# Patient Record
Sex: MALE | Race: WHITE | NOT HISPANIC OR LATINO
[De-identification: names, ages, dates, MRNs, and addresses within clinical notes are randomized per-mention and may not be internally consistent; named-entity substitution may affect disease eponyms.]

---

## 2017-05-11 ENCOUNTER — APPOINTMENT (OUTPATIENT)
Dept: CARDIOLOGY | Facility: CLINIC | Age: 72
End: 2017-05-11

## 2019-06-07 ENCOUNTER — APPOINTMENT (OUTPATIENT)
Dept: CARDIOLOGY | Facility: CLINIC | Age: 74
End: 2019-06-07
Payer: MEDICARE

## 2019-06-07 PROCEDURE — 99214 OFFICE O/P EST MOD 30 MIN: CPT

## 2019-06-07 PROCEDURE — 93000 ELECTROCARDIOGRAM COMPLETE: CPT

## 2019-06-26 ENCOUNTER — APPOINTMENT (OUTPATIENT)
Dept: ORTHOPEDIC SURGERY | Facility: CLINIC | Age: 74
End: 2019-06-26

## 2019-08-15 ENCOUNTER — APPOINTMENT (OUTPATIENT)
Dept: CARDIOLOGY | Facility: CLINIC | Age: 74
End: 2019-08-15
Payer: MEDICARE

## 2019-08-15 VITALS — DIASTOLIC BLOOD PRESSURE: 60 MMHG | WEIGHT: 285 LBS | BODY MASS INDEX: 38.65 KG/M2 | SYSTOLIC BLOOD PRESSURE: 110 MMHG

## 2019-08-15 PROCEDURE — 99213 OFFICE O/P EST LOW 20 MIN: CPT

## 2019-08-15 PROCEDURE — 93280 PM DEVICE PROGR EVAL DUAL: CPT

## 2019-08-15 NOTE — PHYSICAL EXAM
[General Appearance - Well Developed] : well developed [No Deformities] : no deformities [General Appearance - Well Nourished] : well nourished [Heart Rate And Rhythm] : heart rate and rhythm were normal [Heart Sounds] : normal S1 and S2 [Arterial Pulses Normal] : the arterial pulses were normal [Respiration, Rhythm And Depth] : normal respiratory rhythm and effort [Auscultation Breath Sounds / Voice Sounds] : lungs were clear to auscultation bilaterally [Left Infraclavicular] : left infraclavicular area [Dry] : dry [Clean] : clean [Bowel Sounds] : normal bowel sounds [Well-Healed] : well-healed [Abdomen Soft] : soft [Abdomen Tenderness] : non-tender [] : no hepato-splenomegaly [Nail Clubbing] : no clubbing of the fingernails [Nail Splinter Hemorrhages] : no splinter hemorrhages of the nails [Cyanosis, Localized] : no localized cyanosis

## 2019-08-15 NOTE — DISCUSSION/SUMMARY
[Patient] : the patient [Family] : the patient's family [Pacemaker Function Normal] : normal pacemaker function

## 2019-09-07 NOTE — ASSESSMENT
[FreeTextEntry1] : A/P \par 1. CHB/PPM \par -Normal PPM function \par -No arrhythmia's recorded \par -Pacer dependent \par -RTO in 6 months \par \par 2. DOMINGUEZ \par -ECHO to reassess LVF \par \par 3. HTN- well controlled \par -continue current antihypertensive regimen \par -2 gm Na diet  \par \par \par

## 2019-09-07 NOTE — REVIEW OF SYSTEMS
[Dyspnea on exertion] : dyspnea during exertion [Negative] : Heme/Lymph [Lower Ext Edema] : no extremity edema

## 2019-09-07 NOTE — HISTORY OF PRESENT ILLNESS
[None] : The patient complains of no symptoms [Palpitations] : no palpitations [SOB] : no dyspnea [Erythema at Site] : no erythema at device site [Syncope] : no syncope [Swelling at Site] : no swelling at device site [de-identified] : 73 y/o male with a PPM/ CHB , presents for routine device interrogation. \par \par Last in-office device interrogation 11/2016 . \par \par He denies CP/SOB / palpitations . No dizziness or syncope \par Notes worsening in [place]Z . \par \par ECG (8/15/2019)  - SR at 72 bpm. RV paced rhythm .

## 2019-09-07 NOTE — PROCEDURE
[Pacemaker] : pacemaker [DDDR] : DDDR [Voltage: ___ volts] : Voltage was [unfilled] volts [Impedance: ___ Ohms] : current cell impedance is [unfilled] Ohms [Longevity: ___ months] : The estimated remaining battery life is [unfilled] months [Threshold Testing Performed] : Threshold testing was performed [Lead Imp:  ___ohms] : lead impedance was [unfilled] ohms [Sensing Amplitude ___mv] : sensing amplitude was [unfilled] mv [___V @] : [unfilled] V [___ ms] : [unfilled] ms [Asense-Vsense ___ %] : Asense-Vsense [unfilled]% [Asense-Vpace ___ %] : Asense-Vpace [unfilled]% [Apace-Vsense ___ %] : Apace-Vsense [unfilled]% [Apace-Vpace ___ %] : Apace-Vpace [unfilled]% [See Scanned Paceart Report] : See scanned paceart report [See Device Printout] : See device printout [de-identified] : Pacer Dependant, no underlying at 30bpm [de-identified] : MAURILIO [de-identified] : [id number] [de-identified] : Arleth [de-identified] : 60/130 [de-identified] : 10/31/14 [de-identified] : Normal Device Function . No arrhythmia's recorded.  99.2%

## 2019-09-12 ENCOUNTER — APPOINTMENT (OUTPATIENT)
Dept: CARDIOLOGY | Facility: CLINIC | Age: 74
End: 2019-09-12
Payer: MEDICARE

## 2019-09-12 PROCEDURE — 93306 TTE W/DOPPLER COMPLETE: CPT

## 2019-11-22 ENCOUNTER — APPOINTMENT (OUTPATIENT)
Dept: CARDIOLOGY | Facility: CLINIC | Age: 74
End: 2019-11-22
Payer: MEDICARE

## 2019-11-22 PROCEDURE — 93000 ELECTROCARDIOGRAM COMPLETE: CPT

## 2019-11-22 PROCEDURE — 99214 OFFICE O/P EST MOD 30 MIN: CPT

## 2019-12-02 ENCOUNTER — OUTPATIENT (OUTPATIENT)
Dept: OUTPATIENT SERVICES | Facility: HOSPITAL | Age: 74
LOS: 1 days | Discharge: HOME | End: 2019-12-02

## 2019-12-02 ENCOUNTER — APPOINTMENT (OUTPATIENT)
Dept: UROLOGY | Facility: CLINIC | Age: 74
End: 2019-12-02
Payer: MEDICARE

## 2019-12-02 PROCEDURE — 99203 OFFICE O/P NEW LOW 30 MIN: CPT

## 2019-12-02 NOTE — HISTORY OF PRESENT ILLNESS
[Currently Experiencing ___] :  [unfilled] [Nocturia] : nocturia [None] : None [FreeTextEntry1] : YARA WILLIAMSON is a 74 year old male with a past medical history of BPH. Presents to the office today for recurrent UTI x 3 months ago. Patient referred by PMD for UTI, treated with Cipro and Tamsulosin 0.4 mg, which he took for 3 months, stopped it 2 weeks ago. Currently states he has no urinary issues except for nocturia 2 x a night. Denies flank pain,dysuria, gross hematuria, fever, or chills. Non smoker. \par  [Urinary Incontinence] : no urinary incontinence [Urinary Frequency] : no urinary frequency [Urinary Urgency] : no urinary urgency [Urinary Retention] : no urinary retention [Weak Stream] : no weak stream [Straining] : no straining [Dysuria] : no dysuria [Intermittency] : no intermittency [Hematuria - Gross] : no gross hematuria [Hematuria - Microscopic] : no microscopic hematuria

## 2019-12-02 NOTE — PHYSICAL EXAM
[General Appearance - Well Nourished] : well nourished [General Appearance - Well Developed] : well developed [Normal Appearance] : normal appearance [Well Groomed] : well groomed [General Appearance - In No Acute Distress] : no acute distress [Edema] : no peripheral edema [Respiration, Rhythm And Depth] : normal respiratory rhythm and effort [Exaggerated Use Of Accessory Muscles For Inspiration] : no accessory muscle use [Abdomen Soft] : soft [Abdomen Tenderness] : non-tender [Costovertebral Angle Tenderness] : no ~M costovertebral angle tenderness [Normal Station and Gait] : the gait and station were normal for the patient's age [] : no rash [No Focal Deficits] : no focal deficits [Oriented To Time, Place, And Person] : oriented to person, place, and time [Affect] : the affect was normal [Mood] : the mood was normal [Not Anxious] : not anxious

## 2019-12-02 NOTE — ASSESSMENT
[FreeTextEntry1] : YARA WILLIAMSON is a 74 year old male with a past medical history of BPH. Presents to the office today for recurrent UTI x 3 months ago. Patient referred by PMD for UTI, treated with Cipro and Tamsulosin 0.4 mg, which he took for 3 months, stopped it 2 weeks ago. Currently states he has no urinary issues except for nocturia 2 x a night. Denies flank pain,dysuria, gross hematuria, fever, or chills. Non smoker. \par

## 2019-12-03 DIAGNOSIS — N40.0 BENIGN PROSTATIC HYPERPLASIA WITHOUT LOWER URINARY TRACT SYMPTOMS: ICD-10-CM

## 2019-12-03 DIAGNOSIS — N39.0 URINARY TRACT INFECTION, SITE NOT SPECIFIED: ICD-10-CM

## 2019-12-03 LAB
APPEARANCE: CLEAR
BILIRUBIN URINE: NEGATIVE
BLOOD URINE: NEGATIVE
COLOR: YELLOW
GLUCOSE QUALITATIVE U: NEGATIVE
KETONES URINE: NEGATIVE
LEUKOCYTE ESTERASE URINE: NEGATIVE
NITRITE URINE: NEGATIVE
PH URINE: 5.5
PROTEIN URINE: NORMAL
SPECIFIC GRAVITY URINE: 1.02
UROBILINOGEN URINE: NORMAL

## 2019-12-04 LAB — BACTERIA UR CULT: NORMAL

## 2019-12-09 ENCOUNTER — FORM ENCOUNTER (OUTPATIENT)
Age: 74
End: 2019-12-09

## 2019-12-10 ENCOUNTER — OUTPATIENT (OUTPATIENT)
Dept: OUTPATIENT SERVICES | Facility: HOSPITAL | Age: 74
LOS: 1 days | Discharge: HOME | End: 2019-12-10
Payer: MEDICARE

## 2019-12-10 DIAGNOSIS — N40.0 BENIGN PROSTATIC HYPERPLASIA WITHOUT LOWER URINARY TRACT SYMPTOMS: ICD-10-CM

## 2019-12-10 PROCEDURE — 76857 US EXAM PELVIC LIMITED: CPT | Mod: 26

## 2020-03-02 ENCOUNTER — APPOINTMENT (OUTPATIENT)
Dept: UROLOGY | Facility: CLINIC | Age: 75
End: 2020-03-02

## 2020-06-04 ENCOUNTER — APPOINTMENT (OUTPATIENT)
Dept: CARDIOLOGY | Facility: CLINIC | Age: 75
End: 2020-06-04

## 2020-06-18 ENCOUNTER — APPOINTMENT (OUTPATIENT)
Dept: CARDIOLOGY | Facility: CLINIC | Age: 75
End: 2020-06-18

## 2020-06-19 ENCOUNTER — APPOINTMENT (OUTPATIENT)
Dept: CARDIOLOGY | Facility: CLINIC | Age: 75
End: 2020-06-19
Payer: MEDICARE

## 2020-06-19 VITALS
WEIGHT: 296 LBS | TEMPERATURE: 98 F | HEIGHT: 69 IN | HEART RATE: 80 BPM | SYSTOLIC BLOOD PRESSURE: 110 MMHG | BODY MASS INDEX: 43.84 KG/M2 | DIASTOLIC BLOOD PRESSURE: 74 MMHG

## 2020-06-19 PROCEDURE — 93280 PM DEVICE PROGR EVAL DUAL: CPT

## 2020-06-19 PROCEDURE — 99213 OFFICE O/P EST LOW 20 MIN: CPT

## 2020-06-19 RX ORDER — METOPROLOL TARTRATE 50 MG/1
50 TABLET, FILM COATED ORAL DAILY
Refills: 0 | Status: DISCONTINUED | COMMUNITY
End: 2020-06-19

## 2020-06-19 NOTE — REASON FOR VISIT
[Follow-up Device Check] : follow-up device check visit [Spouse] : spouse [___ Device Check] : [unfilled] device check

## 2020-06-19 NOTE — PHYSICAL EXAM
[General Appearance - Well Nourished] : well nourished [General Appearance - Well Developed] : well developed [No Deformities] : no deformities [Heart Rate And Rhythm] : heart rate and rhythm were normal [Arterial Pulses Normal] : the arterial pulses were normal [Heart Sounds] : normal S1 and S2 [Left Infraclavicular] : left infraclavicular area [Respiration, Rhythm And Depth] : normal respiratory rhythm and effort [Auscultation Breath Sounds / Voice Sounds] : lungs were clear to auscultation bilaterally [Well-Healed] : well-healed [Dry] : dry [Clean] : clean [Bowel Sounds] : normal bowel sounds [Abdomen Soft] : soft [] : no hepato-splenomegaly [Abdomen Tenderness] : non-tender [Nail Clubbing] : no clubbing of the fingernails [Cyanosis, Localized] : no localized cyanosis [Nail Splinter Hemorrhages] : no splinter hemorrhages of the nails

## 2020-07-22 ENCOUNTER — RECORD ABSTRACTING (OUTPATIENT)
Age: 75
End: 2020-07-22

## 2020-07-22 DIAGNOSIS — Z78.9 OTHER SPECIFIED HEALTH STATUS: ICD-10-CM

## 2020-07-27 ENCOUNTER — APPOINTMENT (OUTPATIENT)
Dept: UROLOGY | Facility: CLINIC | Age: 75
End: 2020-07-27
Payer: MEDICARE

## 2020-07-27 DIAGNOSIS — N39.0 URINARY TRACT INFECTION, SITE NOT SPECIFIED: ICD-10-CM

## 2020-07-27 DIAGNOSIS — N40.0 BENIGN PROSTATIC HYPERPLASIA WITHOUT LOWER URINARY TRACT SYMPMS: ICD-10-CM

## 2020-07-27 PROCEDURE — 99213 OFFICE O/P EST LOW 20 MIN: CPT

## 2020-07-27 RX ORDER — TAMSULOSIN HYDROCHLORIDE 0.4 MG/1
0.4 CAPSULE ORAL
Qty: 90 | Refills: 0 | Status: COMPLETED | COMMUNITY
Start: 2019-11-19 | End: 2020-07-27

## 2020-07-27 NOTE — PHYSICAL EXAM
[Normal Appearance] : normal appearance [General Appearance - Well Developed] : well developed [General Appearance - Well Nourished] : well nourished [Well Groomed] : well groomed [General Appearance - In No Acute Distress] : no acute distress [Abdomen Soft] : soft [Abdomen Tenderness] : non-tender [Costovertebral Angle Tenderness] : no ~M costovertebral angle tenderness [] : no respiratory distress [Edema] : no peripheral edema [Respiration, Rhythm And Depth] : normal respiratory rhythm and effort [Oriented To Time, Place, And Person] : oriented to person, place, and time [Exaggerated Use Of Accessory Muscles For Inspiration] : no accessory muscle use [Affect] : the affect was normal [Mood] : the mood was normal [Not Anxious] : not anxious [Normal Station and Gait] : the gait and station were normal for the patient's age [No Focal Deficits] : no focal deficits

## 2020-08-03 PROBLEM — N39.0 RECURRENT UTI: Status: ACTIVE | Noted: 2019-12-02

## 2020-08-03 NOTE — ASSESSMENT
[FreeTextEntry1] : YARA WILLIAMSON is a 75 year old male with a past medical history of BPH. Presents to the office today for a follow up. Currently states he has no urinary issues except for nocturia 0-1 x a night. Denies flank pain,dysuria, gross hematuria, fever, or chills. Non smoker. He is on Tamsulosin 0.4 mg daily started 3 weeks ago and states his urination is good, no complaints. Satisfied with urinary condition. No recent UTI's. \par \par 12/2019\par UA negative\par Urine culture- no growth\par \par US 12/2019 -- images visualized by me\par -PVR - 63 cc\par -Prostate volume 52 cc

## 2020-08-04 ENCOUNTER — APPOINTMENT (OUTPATIENT)
Dept: CARDIOLOGY | Facility: CLINIC | Age: 75
End: 2020-08-04
Payer: MEDICARE

## 2020-08-04 VITALS
HEIGHT: 69 IN | SYSTOLIC BLOOD PRESSURE: 108 MMHG | HEART RATE: 66 BPM | DIASTOLIC BLOOD PRESSURE: 78 MMHG | TEMPERATURE: 98 F

## 2020-08-04 PROCEDURE — 93000 ELECTROCARDIOGRAM COMPLETE: CPT

## 2020-08-04 PROCEDURE — 99214 OFFICE O/P EST MOD 30 MIN: CPT

## 2020-08-04 RX ORDER — LISINOPRIL 40 MG/1
40 TABLET ORAL DAILY
Refills: 0 | Status: DISCONTINUED | COMMUNITY
End: 2020-08-04

## 2020-08-04 RX ORDER — METOPROLOL SUCCINATE 25 MG/1
25 TABLET, EXTENDED RELEASE ORAL DAILY
Qty: 90 | Refills: 3 | Status: DISCONTINUED | COMMUNITY
Start: 2020-06-19 | End: 2020-08-04

## 2020-08-04 RX ORDER — LISINOPRIL 10 MG/1
10 TABLET ORAL DAILY
Refills: 0 | Status: DISCONTINUED | COMMUNITY
End: 2020-08-04

## 2020-08-04 NOTE — PHYSICAL EXAM
[General Appearance - Well Nourished] : well nourished [General Appearance - Well Developed] : well developed [Normal Conjunctiva] : the conjunctiva exhibited no abnormalities [Normal Oral Mucosa] : normal oral mucosa [Normal Jugular Venous A Waves Present] : normal jugular venous A waves present [Normal Jugular Venous V Waves Present] : normal jugular venous V waves present [No Jugular Venous Eng A Waves] : no jugular venous eng A waves [Respiration, Rhythm And Depth] : normal respiratory rhythm and effort [Heart Rate And Rhythm] : heart rate and rhythm were normal [Heart Sounds] : normal S1 and S2 [Chest Palpation] : palpation of the chest revealed no abnormalities [Auscultation Breath Sounds / Voice Sounds] : lungs were clear to auscultation bilaterally [Arterial Pulses Normal] : the arterial pulses were normal [Abdomen Soft] : soft [Abdomen Tenderness] : non-tender [Abnormal Walk] : normal gait [Nail Clubbing] : no clubbing of the fingernails [Skin Turgor] : normal skin turgor [Oriented To Time, Place, And Person] : oriented to person, place, and time [] : no rash [Affect] : the affect was normal [Memory Recent] : recent memory was not impaired [FreeTextEntry1] : Obese

## 2020-08-04 NOTE — DISCUSSION/SUMMARY
[FreeTextEntry1] : Patient was instructed to target their T. Cholesterol to less than 200 mg/dl and LDL cholesterol to less than 70 mg/dl.\par Exercise and weight loss was advised. Patient does not exercise at all\par Maintain cardiac medications. \par Patient was advised to repeat a BMP, CBC , fasting lipid profile and hepatic panel.\par

## 2020-08-04 NOTE — REVIEW OF SYSTEMS
[Joint Pain] : joint pain [Limb Weakness (Paresis)] : limb weakness [Negative] : Heme/Lymph [Dizziness] : no dizziness [Tingling (Paresthesia)] : no tingling [Numbness (Hypesthesia)] : no numbness [Convulsions] : no convulsions

## 2020-08-04 NOTE — ASSESSMENT
[FreeTextEntry1] : ASHD\par S/P CABG\par S/P PTCA/stent of RCA\par DM\par Hyperlipidemia\par Obesity

## 2020-08-04 NOTE — HISTORY OF PRESENT ILLNESS
[FreeTextEntry1] : CAD\par S/P MI\par S/P CABG\par PPM\par HTN\par S/P PTCA/stent of RCA\par CVA\par DM

## 2020-08-04 NOTE — REASON FOR VISIT
[FreeTextEntry1] : Patient presents for follow up. His furosemide was decreased to 20 mg daily from 40 mg. and his lisinopril was discontinued by his PCP.

## 2020-09-07 NOTE — HISTORY OF PRESENT ILLNESS
[None] : The patient complains of no symptoms [Palpitations] : no palpitations [SOB] : no dyspnea [Syncope] : no syncope [Swelling at Site] : no swelling at device site [de-identified] : 74 y/o male with a PPM/ CHB , presents for routine device interrogation. \par \par \par \par He denies CP/SOB / palpitations . No dizziness or syncope \par  \par \par ECG (8/15/2019)  - SR at 72 bpm. RV paced rhythm .   [Erythema at Site] : no erythema at device site

## 2020-09-07 NOTE — ASSESSMENT
[FreeTextEntry1] : A/P \par 1. CHB/PPM \par -Normal PPM function \par -No arrhythmia's recorded \par -Pacer dependent \par -RTO in 6 months \par \par HTN\par -continue current antihypertensive regimen \par -2 gm Na diet  \par - c/w metoprolol succ 25mg daily\par \par NOT ON REMOTE\par \par

## 2020-09-07 NOTE — PROCEDURE
[See Scanned Paceart Report] : See scanned paceart report [See Device Printout] : See device printout [Pacemaker] : pacemaker [DDDR] : DDDR [Impedance: ___ Ohms] : current cell impedance is [unfilled] Ohms [Threshold Testing Performed] : Threshold testing was performed [Sensing Amplitude ___mv] : sensing amplitude was [unfilled] mv [Asense-Vsense ___ %] : Asense-Vsense [unfilled]% [___ ms] : [unfilled] ms [Asense-Vpace ___ %] : Asense-Vpace [unfilled]% [Apace-Vsense ___ %] : Apace-Vsense [unfilled]% [Apace-Vpace ___ %] : Apace-Vpace [unfilled]% [Longevity: ___ months] : The estimated remaining battery life is [unfilled] months [Voltage: ___ volts] : Voltage was [unfilled] volts [Lead Imp:  ___ohms] : lead impedance was [unfilled] ohms [___V @] : [unfilled] V [Programmed for Longevity] : output reprogrammed for improved battery longevity [de-identified] : Pacer Dependant, no underlying at 30bpm [de-identified] : MAURILIO [de-identified] : Arleth [de-identified] : [id number] [de-identified] : 60/130 [de-identified] : 10/31/14 [de-identified] : Normal Device Function . No arrhythmia's recorded.  99.2%

## 2020-09-07 NOTE — REVIEW OF SYSTEMS
[Dyspnea on exertion] : dyspnea during exertion [Negative] : Endocrine [Lower Ext Edema] : no extremity edema

## 2020-11-17 ENCOUNTER — APPOINTMENT (OUTPATIENT)
Dept: CARDIOLOGY | Facility: CLINIC | Age: 75
End: 2020-11-17
Payer: MEDICARE

## 2020-11-17 VITALS
HEIGHT: 69 IN | HEART RATE: 83 BPM | TEMPERATURE: 97.3 F | BODY MASS INDEX: 43.99 KG/M2 | SYSTOLIC BLOOD PRESSURE: 131 MMHG | WEIGHT: 297 LBS | DIASTOLIC BLOOD PRESSURE: 83 MMHG

## 2020-11-17 PROCEDURE — 99213 OFFICE O/P EST LOW 20 MIN: CPT

## 2020-11-17 PROCEDURE — 99072 ADDL SUPL MATRL&STAF TM PHE: CPT

## 2020-11-17 PROCEDURE — 93280 PM DEVICE PROGR EVAL DUAL: CPT

## 2020-11-17 RX ORDER — ONDANSETRON 4 MG/1
4 TABLET, ORALLY DISINTEGRATING ORAL
Qty: 60 | Refills: 0 | Status: COMPLETED | COMMUNITY
Start: 2019-11-03 | End: 2020-11-17

## 2020-11-17 RX ORDER — FAMOTIDINE 20 MG/1
20 TABLET, FILM COATED ORAL DAILY
Refills: 0 | Status: DISCONTINUED | COMMUNITY
End: 2020-11-17

## 2020-11-17 RX ORDER — ASPIRIN 81 MG/1
81 TABLET, COATED ORAL
Refills: 0 | Status: DISCONTINUED | COMMUNITY
End: 2020-11-17

## 2020-11-17 RX ORDER — IPRATROPIUM BROMIDE 42 UG/1
0.06 SPRAY NASAL
Qty: 15 | Refills: 0 | Status: DISCONTINUED | COMMUNITY
Start: 2019-09-22 | End: 2020-11-17

## 2020-11-17 NOTE — PROCEDURE
[See Scanned Paceart Report] : See scanned paceart report [See Device Printout] : See device printout [Pacemaker] : pacemaker [DDDR] : DDDR [Voltage: ___ volts] : Voltage was [unfilled] volts [Impedance: ___ Ohms] : current cell impedance is [unfilled] Ohms [Longevity: ___ months] : The estimated remaining battery life is [unfilled] months [Threshold Testing Performed] : Threshold testing was performed [Programmed for Longevity] : output reprogrammed for improved battery longevity [Asense-Vsense ___ %] : Asense-Vsense [unfilled]% [Asense-Vpace ___ %] : Asense-Vpace [unfilled]% [Apace-Vsense ___ %] : Apace-Vsense [unfilled]% [Apace-Vpace ___ %] : Apace-Vpace [unfilled]% [de-identified] : Pacer Dependant, no underlying at 30bpm [de-identified] : MAURILIO [de-identified] : Arleth [de-identified] : [id number] [de-identified] : 10/31/14 [de-identified] : 60/130 [de-identified] : Normal Device Function .

## 2020-11-17 NOTE — REVIEW OF SYSTEMS
[Dyspnea on exertion] : dyspnea during exertion [Lower Ext Edema] : no extremity edema [Negative] : Heme/Lymph

## 2020-11-17 NOTE — HISTORY OF PRESENT ILLNESS
[None] : The patient complains of no symptoms [Palpitations] : no palpitations [SOB] : no dyspnea [Syncope] : no syncope [Erythema at Site] : no erythema at device site [Swelling at Site] : no swelling at device site [de-identified] : 76 y/o male with a PPM/ CHB , presents for routine device interrogation. \par \par \par \par He denies CP/SOB / palpitations . No dizziness or syncope \par  \par \par ECG (8/15/2019)  - SR at 72 bpm. RV paced rhythm .

## 2020-11-17 NOTE — ASSESSMENT
[FreeTextEntry1] : A/P \par 1. CHB/PPM \par -Normal PPM function \par -No arrhythmia's recorded \par -Pacer dependent \par -RTO in 6 months \par \par

## 2020-11-17 NOTE — PHYSICAL EXAM
[General Appearance - Well Developed] : well developed [General Appearance - Well Nourished] : well nourished [No Deformities] : no deformities [Heart Rate And Rhythm] : heart rate and rhythm were normal [Heart Sounds] : normal S1 and S2 [Arterial Pulses Normal] : the arterial pulses were normal [Respiration, Rhythm And Depth] : normal respiratory rhythm and effort [Auscultation Breath Sounds / Voice Sounds] : lungs were clear to auscultation bilaterally [Left Infraclavicular] : left infraclavicular area [Clean] : clean [Dry] : dry [Well-Healed] : well-healed [Bowel Sounds] : normal bowel sounds [Abdomen Soft] : soft [Abdomen Tenderness] : non-tender [] : no hepato-splenomegaly [Nail Clubbing] : no clubbing of the fingernails [Cyanosis, Localized] : no localized cyanosis [Nail Splinter Hemorrhages] : no splinter hemorrhages of the nails

## 2020-12-01 ENCOUNTER — APPOINTMENT (OUTPATIENT)
Dept: CARDIOLOGY | Facility: CLINIC | Age: 75
End: 2020-12-01
Payer: MEDICARE

## 2020-12-01 VITALS
SYSTOLIC BLOOD PRESSURE: 110 MMHG | BODY MASS INDEX: 44.14 KG/M2 | DIASTOLIC BLOOD PRESSURE: 70 MMHG | WEIGHT: 298 LBS | HEIGHT: 69 IN | TEMPERATURE: 98.2 F | HEART RATE: 67 BPM

## 2020-12-01 DIAGNOSIS — Z00.00 ENCOUNTER FOR GENERAL ADULT MEDICAL EXAMINATION W/OUT ABNORMAL FINDINGS: ICD-10-CM

## 2020-12-01 PROCEDURE — 99214 OFFICE O/P EST MOD 30 MIN: CPT

## 2020-12-01 PROCEDURE — 99072 ADDL SUPL MATRL&STAF TM PHE: CPT

## 2020-12-01 PROCEDURE — 93000 ELECTROCARDIOGRAM COMPLETE: CPT

## 2020-12-01 NOTE — DISCUSSION/SUMMARY
[FreeTextEntry1] : Patient was instructed to target their T. Cholesterol to less than 200 mg/dl and LDL cholesterol to less than 70 mg/dl.\par Exercise and weight loss was advised. Patient does not exercise at all\par Maintain cardiac medications. \par Patient was advised to repeat a BMP, CBC , fasting lipid profile and hepatic panel.\par RV in 6 months.\par Patient was counseled on improving his HDL cholesterol through exercise.

## 2020-12-01 NOTE — PHYSICAL EXAM
[General Appearance - Well Developed] : well developed [General Appearance - Well Nourished] : well nourished [Normal Conjunctiva] : the conjunctiva exhibited no abnormalities [Normal Oral Mucosa] : normal oral mucosa [Normal Jugular Venous A Waves Present] : normal jugular venous A waves present [Normal Jugular Venous V Waves Present] : normal jugular venous V waves present [No Jugular Venous Eng A Waves] : no jugular venous eng A waves [Respiration, Rhythm And Depth] : normal respiratory rhythm and effort [Auscultation Breath Sounds / Voice Sounds] : lungs were clear to auscultation bilaterally [Chest Palpation] : palpation of the chest revealed no abnormalities [Heart Rate And Rhythm] : heart rate and rhythm were normal [Heart Sounds] : normal S1 and S2 [Arterial Pulses Normal] : the arterial pulses were normal [Abdomen Soft] : soft [Abdomen Tenderness] : non-tender [Abnormal Walk] : normal gait [Nail Clubbing] : no clubbing of the fingernails [Skin Turgor] : normal skin turgor [] : no rash [Oriented To Time, Place, And Person] : oriented to person, place, and time [Affect] : the affect was normal [Memory Recent] : recent memory was not impaired [FreeTextEntry1] : +1 LE edema bilaterally

## 2020-12-01 NOTE — REVIEW OF SYSTEMS
[Joint Pain] : joint pain [Limb Weakness (Paresis)] : limb weakness [Negative] : Heme/Lymph [Dizziness] : no dizziness [Numbness (Hypesthesia)] : no numbness [Convulsions] : no convulsions [Tingling (Paresthesia)] : no tingling

## 2021-01-04 ENCOUNTER — APPOINTMENT (OUTPATIENT)
Dept: UROLOGY | Facility: CLINIC | Age: 76
End: 2021-01-04

## 2021-03-01 ENCOUNTER — RX RENEWAL (OUTPATIENT)
Age: 76
End: 2021-03-01

## 2021-04-27 ENCOUNTER — RX RENEWAL (OUTPATIENT)
Age: 76
End: 2021-04-27

## 2021-05-18 ENCOUNTER — APPOINTMENT (OUTPATIENT)
Dept: CARDIOLOGY | Facility: CLINIC | Age: 76
End: 2021-05-18
Payer: MEDICARE

## 2021-05-18 VITALS
HEART RATE: 66 BPM | BODY MASS INDEX: 45.18 KG/M2 | HEIGHT: 69 IN | DIASTOLIC BLOOD PRESSURE: 62 MMHG | OXYGEN SATURATION: 94 % | WEIGHT: 305 LBS | TEMPERATURE: 97.7 F | SYSTOLIC BLOOD PRESSURE: 96 MMHG

## 2021-05-18 PROCEDURE — 99072 ADDL SUPL MATRL&STAF TM PHE: CPT

## 2021-05-18 PROCEDURE — 93280 PM DEVICE PROGR EVAL DUAL: CPT

## 2021-05-30 NOTE — PROCEDURE
[See Scanned Paceart Report] : See scanned paceart report [See Device Printout] : See device printout [Pacemaker] : pacemaker [DDDR] : DDDR [Threshold Testing Performed] : Threshold testing was performed [Programmed for Longevity] : output reprogrammed for improved battery longevity [de-identified] : Pacer Dependant, no underlying at 30bpm [de-identified] : MAURILIO [de-identified] : Arleth [de-identified] : [id number] [de-identified] : 10/31/14 [de-identified] : 60/130 [de-identified] : Normal Device Function .

## 2021-05-30 NOTE — HISTORY OF PRESENT ILLNESS
[None] : The patient complains of no symptoms [Palpitations] : no palpitations [SOB] : no dyspnea [Syncope] : no syncope [Erythema at Site] : no erythema at device site [Swelling at Site] : no swelling at device site [de-identified] : 75 y/o male with a PPM/ CHB , presents for routine device interrogation. \par \par \par \par He denies CP/SOB / palpitations . No dizziness or syncope \par  \par \par ECG (8/15/2019)  - SR at 72 bpm. RV paced rhythm .

## 2021-06-04 ENCOUNTER — RX RENEWAL (OUTPATIENT)
Age: 76
End: 2021-06-04

## 2021-06-07 ENCOUNTER — APPOINTMENT (OUTPATIENT)
Dept: CARDIOLOGY | Facility: CLINIC | Age: 76
End: 2021-06-07
Payer: MEDICARE

## 2021-06-07 VITALS
HEART RATE: 74 BPM | HEIGHT: 69 IN | TEMPERATURE: 98.7 F | DIASTOLIC BLOOD PRESSURE: 70 MMHG | WEIGHT: 306 LBS | BODY MASS INDEX: 45.32 KG/M2 | SYSTOLIC BLOOD PRESSURE: 100 MMHG

## 2021-06-07 PROCEDURE — 99072 ADDL SUPL MATRL&STAF TM PHE: CPT

## 2021-06-07 PROCEDURE — 93000 ELECTROCARDIOGRAM COMPLETE: CPT

## 2021-06-07 PROCEDURE — 99214 OFFICE O/P EST MOD 30 MIN: CPT

## 2021-06-07 NOTE — DISCUSSION/SUMMARY
[FreeTextEntry1] : Patient was instructed to target his T. Cholesterol to less than 200 mg/dl and LDL cholesterol to less than 70 mg/dl.\par Exercise and weight loss was advised. Patient does not exercise at all\par Maintain cardiac medications. \par Patient was advised to repeat a BMP, CBC , fasting lipid profile and hepatic panel.\par Patient was counseled on improving his HDL cholesterol through exercise.\par RV in 4 months

## 2021-06-07 NOTE — ASSESSMENT
[FreeTextEntry1] : ASHD\par S/P CABG\par S/P PTCA/stent of RCA\par DM\par Hyperlipidemia\par Obesity Doxycycline Pregnancy And Lactation Text: This medication is Pregnancy Category D and not consider safe during pregnancy. It is also excreted in breast milk but is considered safe for shorter treatment courses.

## 2021-09-06 ENCOUNTER — EMERGENCY (EMERGENCY)
Facility: HOSPITAL | Age: 76
LOS: 0 days | Discharge: HOME | End: 2021-09-06
Attending: EMERGENCY MEDICINE | Admitting: EMERGENCY MEDICINE
Payer: MEDICARE

## 2021-09-06 VITALS
OXYGEN SATURATION: 96 % | DIASTOLIC BLOOD PRESSURE: 58 MMHG | WEIGHT: 220.02 LBS | SYSTOLIC BLOOD PRESSURE: 124 MMHG | TEMPERATURE: 96 F | HEART RATE: 87 BPM | RESPIRATION RATE: 18 BRPM | HEIGHT: 72 IN

## 2021-09-06 DIAGNOSIS — S02.2XXA FRACTURE OF NASAL BONES, INITIAL ENCOUNTER FOR CLOSED FRACTURE: ICD-10-CM

## 2021-09-06 DIAGNOSIS — S01.81XA LACERATION WITHOUT FOREIGN BODY OF OTHER PART OF HEAD, INITIAL ENCOUNTER: ICD-10-CM

## 2021-09-06 DIAGNOSIS — M25.562 PAIN IN LEFT KNEE: ICD-10-CM

## 2021-09-06 DIAGNOSIS — S01.21XA LACERATION WITHOUT FOREIGN BODY OF NOSE, INITIAL ENCOUNTER: ICD-10-CM

## 2021-09-06 DIAGNOSIS — S09.90XA UNSPECIFIED INJURY OF HEAD, INITIAL ENCOUNTER: ICD-10-CM

## 2021-09-06 DIAGNOSIS — I25.10 ATHEROSCLEROTIC HEART DISEASE OF NATIVE CORONARY ARTERY WITHOUT ANGINA PECTORIS: ICD-10-CM

## 2021-09-06 DIAGNOSIS — M54.2 CERVICALGIA: ICD-10-CM

## 2021-09-06 DIAGNOSIS — S01.511A LACERATION WITHOUT FOREIGN BODY OF LIP, INITIAL ENCOUNTER: ICD-10-CM

## 2021-09-06 DIAGNOSIS — Z23 ENCOUNTER FOR IMMUNIZATION: ICD-10-CM
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
## 2021-09-06 LAB
ALBUMIN SERPL ELPH-MCNC: 4.3 G/DL — SIGNIFICANT CHANGE UP (ref 3.5–5.2)
ALP SERPL-CCNC: 99 U/L — SIGNIFICANT CHANGE UP (ref 30–115)
ALT FLD-CCNC: 10 U/L — SIGNIFICANT CHANGE UP (ref 0–41)
ANION GAP SERPL CALC-SCNC: 13 MMOL/L — SIGNIFICANT CHANGE UP (ref 7–14)
AST SERPL-CCNC: 19 U/L — SIGNIFICANT CHANGE UP (ref 0–41)
BASOPHILS # BLD AUTO: 0.02 K/UL — SIGNIFICANT CHANGE UP (ref 0–0.2)
BASOPHILS NFR BLD AUTO: 0.3 % — SIGNIFICANT CHANGE UP (ref 0–1)
BILIRUB SERPL-MCNC: 1 MG/DL — SIGNIFICANT CHANGE UP (ref 0.2–1.2)
BUN SERPL-MCNC: 33 MG/DL — HIGH (ref 10–20)
CALCIUM SERPL-MCNC: 9.2 MG/DL — SIGNIFICANT CHANGE UP (ref 8.5–10.1)
CHLORIDE SERPL-SCNC: 103 MMOL/L — SIGNIFICANT CHANGE UP (ref 98–110)
CO2 SERPL-SCNC: 24 MMOL/L — SIGNIFICANT CHANGE UP (ref 17–32)
CREAT SERPL-MCNC: 1 MG/DL — SIGNIFICANT CHANGE UP (ref 0.7–1.5)
EOSINOPHIL # BLD AUTO: 0.11 K/UL — SIGNIFICANT CHANGE UP (ref 0–0.7)
EOSINOPHIL NFR BLD AUTO: 1.4 % — SIGNIFICANT CHANGE UP (ref 0–8)
GLUCOSE SERPL-MCNC: 127 MG/DL — HIGH (ref 70–99)
HCT VFR BLD CALC: 45.6 % — SIGNIFICANT CHANGE UP (ref 42–52)
HGB BLD-MCNC: 14.7 G/DL — SIGNIFICANT CHANGE UP (ref 14–18)
IMM GRANULOCYTES NFR BLD AUTO: 0.3 % — SIGNIFICANT CHANGE UP (ref 0.1–0.3)
LYMPHOCYTES # BLD AUTO: 1.76 K/UL — SIGNIFICANT CHANGE UP (ref 1.2–3.4)
LYMPHOCYTES # BLD AUTO: 22 % — SIGNIFICANT CHANGE UP (ref 20.5–51.1)
MCHC RBC-ENTMCNC: 30.4 PG — SIGNIFICANT CHANGE UP (ref 27–31)
MCHC RBC-ENTMCNC: 32.2 G/DL — SIGNIFICANT CHANGE UP (ref 32–37)
MCV RBC AUTO: 94.2 FL — HIGH (ref 80–94)
MONOCYTES # BLD AUTO: 0.62 K/UL — HIGH (ref 0.1–0.6)
MONOCYTES NFR BLD AUTO: 7.8 % — SIGNIFICANT CHANGE UP (ref 1.7–9.3)
NEUTROPHILS # BLD AUTO: 5.47 K/UL — SIGNIFICANT CHANGE UP (ref 1.4–6.5)
NEUTROPHILS NFR BLD AUTO: 68.2 % — SIGNIFICANT CHANGE UP (ref 42.2–75.2)
NRBC # BLD: 0 /100 WBCS — SIGNIFICANT CHANGE UP (ref 0–0)
PLATELET # BLD AUTO: 151 K/UL — SIGNIFICANT CHANGE UP (ref 130–400)
POTASSIUM SERPL-MCNC: 5.5 MMOL/L — HIGH (ref 3.5–5)
POTASSIUM SERPL-SCNC: 5.5 MMOL/L — HIGH (ref 3.5–5)
PROT SERPL-MCNC: 6.9 G/DL — SIGNIFICANT CHANGE UP (ref 6–8)
RBC # BLD: 4.84 M/UL — SIGNIFICANT CHANGE UP (ref 4.7–6.1)
RBC # FLD: 12.7 % — SIGNIFICANT CHANGE UP (ref 11.5–14.5)
SODIUM SERPL-SCNC: 140 MMOL/L — SIGNIFICANT CHANGE UP (ref 135–146)
TROPONIN T SERPL-MCNC: <0.01 NG/ML — SIGNIFICANT CHANGE UP
WBC # BLD: 8 K/UL — SIGNIFICANT CHANGE UP (ref 4.8–10.8)
WBC # FLD AUTO: 8 K/UL — SIGNIFICANT CHANGE UP (ref 4.8–10.8)

## 2021-09-06 PROCEDURE — 72170 X-RAY EXAM OF PELVIS: CPT | Mod: 26

## 2021-09-06 PROCEDURE — 71045 X-RAY EXAM CHEST 1 VIEW: CPT | Mod: 26

## 2021-09-06 PROCEDURE — 70450 CT HEAD/BRAIN W/O DYE: CPT | Mod: 26,MA

## 2021-09-06 PROCEDURE — 72125 CT NECK SPINE W/O DYE: CPT | Mod: 26,MA

## 2021-09-06 PROCEDURE — 99285 EMERGENCY DEPT VISIT HI MDM: CPT | Mod: 25

## 2021-09-06 PROCEDURE — 12013 RPR F/E/E/N/L/M 2.6-5.0 CM: CPT

## 2021-09-06 PROCEDURE — 12051 INTMD RPR FACE/MM 2.5 CM/<: CPT

## 2021-09-06 PROCEDURE — 73562 X-RAY EXAM OF KNEE 3: CPT | Mod: 26,LT

## 2021-09-06 RX ORDER — TETANUS TOXOID, REDUCED DIPHTHERIA TOXOID AND ACELLULAR PERTUSSIS VACCINE, ADSORBED 5; 2.5; 8; 8; 2.5 [IU]/.5ML; [IU]/.5ML; UG/.5ML; UG/.5ML; UG/.5ML
0.5 SUSPENSION INTRAMUSCULAR ONCE
Refills: 0 | Status: COMPLETED | OUTPATIENT
Start: 2021-09-06 | End: 2021-09-06

## 2021-09-06 RX ADMIN — TETANUS TOXOID, REDUCED DIPHTHERIA TOXOID AND ACELLULAR PERTUSSIS VACCINE, ADSORBED 0.5 MILLILITER(S): 5; 2.5; 8; 8; 2.5 SUSPENSION INTRAMUSCULAR at 18:44

## 2021-09-06 NOTE — ED PROVIDER NOTE - OBJECTIVE STATEMENT
77 yo male w/ PMH of CAD presents for head trauma after fall.  No blood thinners, mechanical fall tripped and fell in bathroom, landed on face/nose and left knee, no LOC, was able to get up with help after.  Reports lacerations to nose and bottom left lip, L sided neck pain, and L knee pain.  Denies headache, N/V, abdominal pain, dizziness, midline back/neck pain, and pain elsewhere.

## 2021-09-06 NOTE — ED PROVIDER NOTE - WR ORDER DATE AND TIME 1
Follow up outreach call attempt, no answer. CTN left VM with contact information and request for return call. CTN will continue with outreach call attempts. 06-Sep-2021 18:08

## 2021-09-06 NOTE — ED PROVIDER NOTE - PATIENT PORTAL LINK FT
You can access the FollowMyHealth Patient Portal offered by [facility] by registering at the following website: [email or web address]. By joining [facility]’s FollowMyHealth portal, you will also be able to view your health information using other applications (apps) compatible with our system.

## 2021-09-06 NOTE — ED PROVIDER NOTE - NSFOLLOWUPINSTRUCTIONS_ED_ALL_ED_FT
PLEASE HAVE SUTURES OVER THE NOSE AND SINGLE BLACK SUTURE OVER OUTER LIP BORDER REMOVED IN 5 DAYS.  THE 2 SKIN COLORED SUTURES ON INNER LIP ARE ABSORBABLE AND SHOULD NOT BE REMOVED.    Closed Head Injury    Closed head injury in an injury to your head that may or may not involve a traumatic brain injury (TBI). Symptoms of TBI can be short or long lasting and include headache, dizziness, interference with memory or speech, fatigue, confusion, changes in sleep, mood changes, nausea, depression/anxiety, and dulling of senses. Make sure to obtain proper rest which includes getting plenty of sleep, avoiding excessive visual stimulation, and avoiding activities that may cause physical or mental stress. Avoid any situation where there is potential for another head injury including sports.    SEEK MEDICAL CARE IF YOU HAVE THE FOLLOWING SYMPTOMS: unusual drowsiness, vomiting, severe dizziness, seizures, lightheadedness, muscular weakness, different pupil sizes, visual changes, or clear or bloody discharge from your ears or nose.    Laceration    WHAT YOU NEED TO KNOW:    A laceration is an injury to the skin and the soft tissue underneath it. Lacerations happen when you are cut or hit by something. They can happen anywhere on the body.     DISCHARGE INSTRUCTIONS:    Return to the emergency department if:     You have heavy bleeding or bleeding that does not stop after 10 minutes of holding firm, direct pressure over the wound.       Your wound opens up.     Contact your healthcare provider if:     You have a fever or chills.       Your laceration is red, warm, or swollen.      You have red streaks on your skin coming from your wound.      You have white or yellow drainage from the wound that smells bad.      You have pain that gets worse, even after treatment.       You have questions or concerns about your condition or care.     Medicines:     Prescription pain medicine may be given. Ask how to take this medicine safely.       Antibiotics help treat or prevent a bacterial infection.       Take your medicine as directed. Contact your healthcare provider if you think your medicine is not helping or if you have side effects. Tell him or her if you are allergic to any medicine. Keep a list of the medicines, vitamins, and herbs you take. Include the amounts, and when and why you take them. Bring the list or the pill bottles to follow-up visits. Carry your medicine list with you in case of an emergency.    Care for your wound as directed:     Do not get your wound wet until your healthcare provider says it is okay. Do not soak your wound in water. Do not go swimming until your healthcare provider says it is okay. Carefully wash the wound with soap and water. Gently pat the area dry or allow it to air dry.       Change your bandages when they get wet, dirty, or after washing. Apply new, clean bandages as directed. Do not apply elastic bandages or tape too tight. Do not put powders or lotions over your incision.       Apply antibiotic ointment as directed. Your healthcare provider may give you antibiotic ointment to put over your wound if you have stitches. If you have strips of tape over your incision, let them dry up and fall off on their own. If they do not fall off within 14 days, gently remove them. If you have glue over your wound, do not remove or pick at it. If your glue comes off, do not replace it with glue that you have at home.       Check your wound every day for signs of infection such as swelling, redness, or pus.     Self-care:     Apply ice on your wound for 15 to 20 minutes every hour or as directed. Use an ice pack, or put crushed ice in a plastic bag. Cover it with a towel. Ice helps prevent tissue damage and decreases swelling and pain.      Use a splint as directed. A splint will decrease movement and stress on your wound. It may help it heal faster. A splint may be used for lacerations over joints or areas of your body that bend. Ask your healthcare provider how to apply and remove a splint.       Decrease scarring of your wound by applying ointments as directed. Do not apply ointments until your healthcare provider says it is okay. You may need to wait until your wound is healed. Ask which ointment to buy and how often to use it. After your wound is healed, use sunscreen over the area when you are out in the sun. You should do this for at least 6 months to 1 year after your injury.     Follow up with your healthcare provider as directed: You may need to follow up in 24 to 48 hours to have your wound checked for infection. You will need to return in 3 to 14 days if you have stitches or staples so they can be removed. Care for your wound as directed to prevent infection and help it heal. Write down your questions so you remember to ask them during your visits.

## 2021-09-06 NOTE — ED ADULT TRIAGE NOTE - CHIEF COMPLAINT QUOTE
Pt tripped and fell, hot face against granite countertop in bathroom, no loc, on Aspirin, aox4, gcs 15, laceration to nose.

## 2021-09-06 NOTE — ED PROVIDER NOTE - CLINICAL SUMMARY MEDICAL DECISION MAKING FREE TEXT BOX
75 yo man with mechanical fall and head trauma.  Otherwise well.  Head and C-spine imaging, XRAYs, laceration repair and basically labs to confirm mechanical nature of fall as per history.  Stable for discharge, outpatient follow up and return in 4-5 days for suture removal.

## 2021-09-06 NOTE — ED PROCEDURE NOTE - CPROC ED LACER REPAIR DETAIL1
The wound was explored to base in bloodless field./No foreign body/Multiple flaps were aligned.
The wound was explored to base in bloodless field./No foreign body

## 2021-09-06 NOTE — ED PROVIDER NOTE - NS ED MD DISPO DISCHARGE CCDA
Prior to injection verified patient identity using patient's name and date of birth.  Nilsa DURBIN CMA ([facility])     Patient/Caregiver provided printed discharge information.

## 2021-09-06 NOTE — ED PROVIDER NOTE - CARE PLAN
Principal Discharge DX:	Closed head injury  Secondary Diagnosis:	Facial laceration   1 Principal Discharge DX:	Closed head injury  Secondary Diagnosis:	Facial laceration  Secondary Diagnosis:	Nasal bone fractures

## 2021-09-06 NOTE — ED PROCEDURE NOTE - PROCEDURE ADDITIONAL DETAILS
1 of 6-0 ethilon sutured placed to align vermillion border, 2 of 5-0 chromic gut sutures placed on labial mucosa 1 of 6-0 ethilon sutured placed to align vermillion border, 2 of 5-0 chromic gut sutures placed on labial mucosa.  Mental block with lidocaine was used for anesthesia without complication.

## 2021-09-06 NOTE — ED ADULT NURSE NOTE - OBJECTIVE STATEMENT
pt tripped over stair in bathroom and landed on face - laceration to nose and skin tear to right fa - also c/o left knee pain.  Denies LOC

## 2021-09-06 NOTE — ED PROCEDURE NOTE - ATTENDING CONTRIBUTION TO CARE
I was present for and supervised the key and critical aspects of the procedures performed during the care of the patient.  laceration repair
I was present for and supervised the key and critical aspects of the procedures performed during the care of the patient.  Laceration repair

## 2021-09-06 NOTE — ED PROVIDER NOTE - NS ED ROS FT
Constitutional: No fatigue, confusion, or amnesia.  Head: No headache  ENT: No visual changes.  Cardiac:  No chest pain or SOB.  Respiratory:  No respiratory distress or hemoptysis.  GI:  No nausea, vomiting, or abdominal pain.  :  No incontinence.  MS:  Reports L sided neck pain   Neuro:  No dizziness, LOC, paralysis, or N/T.  Skin:  Reports lacerations

## 2021-09-06 NOTE — ED PROVIDER NOTE - ATTENDING CONTRIBUTION TO CARE
I personally evaluated the patient. I reviewed the Resident’s or Physician Assistant’s note (as assigned above), and agree with the findings and plan except as documented in my note.  77 yo man with mechanical fall and head trauma.  Clinically with nasal bone fracture.  Lacerations repaired.  Head and C-spine imaging.  Tetanus.  Return in 5 days for suture removal.  Or sooner for any new or worsening sympytoms

## 2021-09-06 NOTE — ED PROVIDER NOTE - PHYSICAL EXAMINATION
CONSTITUTIONAL: WDWN. NAD. Speaking in full sentences, moving all extremities.  SKIN: 4.0 cm laceration with flap noted over nose, 1.0 cm laceration on bottom left lip crossing vermillion border  HEAD: Nasal swelling noted with laceration as noted above.  Dry blood noted in nares but no active bleeding.   EYES: PERRLA, EOMI  ENT: Oropharynx WNL   NECK: No posterior midline cervical tenderness. L sided paravertebral cervical TTP   CARD: +S1, S2 no murmurs, gallops, or rubs. Regular rate and rhythm. Radial 2+/4 bilaterally  RESP: CTABL. No wheezes, rales or rhonchi.  ABD: Abdomen soft, nontender, nondistended.  NEURO: Alert, oriented, grossly unremarkable. Strength 5/5 in bilateral UE and LEs. CN 2-12 grossly intact. Follows commands.  MSK: TTP in L knee, decreased ROM in L knee. No other TTP in UE and LEs. No chest wall tenderness or crepitus  BACK: No posterior midline tenderness  PSYCH: Cooperative, appropriate.

## 2021-09-06 NOTE — ED PROCEDURE NOTE - CPROC ED TIME OUT STATEMENT1
“Patient's name, , procedure and correct site were confirmed during the [facility] Timeout.”
“Patient's name, , procedure and correct site were confirmed during the [facility] Timeout.”

## 2021-09-12 ENCOUNTER — EMERGENCY (EMERGENCY)
Facility: HOSPITAL | Age: 76
LOS: 0 days | Discharge: HOME | End: 2021-09-12
Attending: EMERGENCY MEDICINE | Admitting: EMERGENCY MEDICINE
Payer: MEDICARE

## 2021-09-12 VITALS
RESPIRATION RATE: 18 BRPM | SYSTOLIC BLOOD PRESSURE: 125 MMHG | WEIGHT: 199.96 LBS | TEMPERATURE: 98 F | OXYGEN SATURATION: 96 % | HEART RATE: 86 BPM | DIASTOLIC BLOOD PRESSURE: 58 MMHG | HEIGHT: 72 IN

## 2021-09-12 DIAGNOSIS — Z48.02 ENCOUNTER FOR REMOVAL OF SUTURES: ICD-10-CM

## 2021-09-12 DIAGNOSIS — I25.10 ATHEROSCLEROTIC HEART DISEASE OF NATIVE CORONARY ARTERY WITHOUT ANGINA PECTORIS: ICD-10-CM

## 2021-09-12 PROCEDURE — L9995: CPT

## 2021-09-12 NOTE — ED PROVIDER NOTE - OBJECTIVE STATEMENT
77 yo male w/ PMH of CAD presents for suture removal.  Had fall where he landed on his face and cut open nose and lip and had lacerations repaired.  No fevers and no discharge, redness, or pain from wound site.

## 2021-09-12 NOTE — ED PROVIDER NOTE - PATIENT PORTAL LINK FT
You can access the FollowMyHealth Patient Portal offered by [facility] by registering at the following website: [email or web address]. By joining [employer]’s FollowMyHealth portal, you will also be able to view your health information using other applications (apps) compatible with our system.

## 2021-09-12 NOTE — ED PROVIDER NOTE - PRINCIPAL DIAGNOSIS
fluticasone-salmeterol (ADVAIR DISKUS) 250-50 MCG/DOSE AEPB INHALE ONE PUFF INTO THE LUNGS EVERY 12 HOURS 3 Inhaler 1         Physical Examination       The following were examined and determined to be normal: Psych/Neuro, Head/face, Conjunctivae/eyelids, Gums/teeth/lips, Neck, RUE and LUE. The following were examined and determined to be abnormal: RLE and LLE. Well appearing. 1.  Left mid and right lower shin - few well defined brightly erythematous patches with a rare pustule. 2.  Right leg with diffuse marked edema with peau d orange appearance. Skin with ana pink color. No pain in calf. Thigh unremarkable. 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 2. Leg edema, right x 3 weeks - etiology unclear     Ordered duplex U/S - instructed to go to ER to r/o DVT.
Encounter for removal of sutures

## 2021-09-12 NOTE — ED PROVIDER NOTE - CLINICAL SUMMARY MEDICAL DECISION MAKING FREE TEXT BOX
77 yo M presented to ED for suture removal. sutures are clean dry and intact. No sign of infection. Sutures removed

## 2021-09-12 NOTE — ED PROVIDER NOTE - ATTENDING CONTRIBUTION TO CARE
77 yo M presents to ED for suture removal. Pt had sutures placed in the ED on 9/6. Since then pt has not had any pus, erythema. No nausea, vomiting, dizziness.     Const: Well nourished, well developed, appears stated age  Eyes: PERRL, no conjunctival injection  HENT:  Neck supple without meningismus   CV: RRR, Warm, well-perfused extremities  RESP: CTA B/L, no tachypnea   MSK: No gross deformities appreciated  Skin: Warm, dry. No rashes. sutures on nose and lip are clean dry and intact without any pus or erythema    will remove sutures

## 2021-09-12 NOTE — ED PROVIDER NOTE - NSFOLLOWUPINSTRUCTIONS_ED_ALL_ED_FT
Suture Removal, Care After  This sheet gives you information about how to care for yourself after your procedure. Your health care provider may also give you more specific instructions. If you have problems or questions, contact your health care provider.    What can I expect after the procedure?  After your stitches (sutures) are removed, it is common to have:    Some discomfort and swelling in the area.  Slight redness in the area.    Follow these instructions at home:  If you have a bandage:     Wash your hands with soap and water before you change your bandage (dressing). If soap and water are not available, use hand .  Change your dressing as told by your health care provider. If your dressing becomes wet or dirty, or develops a bad smell, change it as soon as possible.  If your dressing sticks to your skin, soak it in warm water to loosen it.  Wound care     Check your wound every day for signs of infection. Check for:    More redness, swelling, or pain.  Fluid or blood.  Warmth.  Pus or a bad smell.    Wash your hands with soap and water before and after touching your wound.  Apply cream or ointment only as directed by your health care provider. If you are using cream or ointment, wash the area with soap and water 2 times a day to remove all the cream or ointment. Rinse off the soap and pat the area dry with a clean towel.  If you have skin glue or adhesive strips on your wound, leave these closures in place. They may need to stay in place for 2 weeks or longer. If adhesive strip edges start to loosen and curl up, you may trim the loose edges. Do not remove adhesive strips completely unless your health care provider tells you to do that.  Keep the wound area dry and clean. Do not take baths, swim, or use a hot tub until your health care provider approves.  Continue to protect the wound from injury.  Do not pick at your wound. Picking can cause an infection.  ImageWhen your wound has completely healed, wear sunscreen over it or cover it with clothing when you are outside. New scars get sunburned easily, which can make scarring worse.  General instructions     Take over-the-counter and prescription medicines only as told by your health care provider.  Keep all follow-up visits as told by your health care provider. This is important.  Contact a health care provider if:  You have redness, swelling, or pain around your wound.  You have fluid or blood coming from your wound.  Your wound feels warm to the touch.  You have pus or a bad smell coming from your wound.  Your wound opens up.  Get help right away if:  You have a fever.  You have redness that is spreading from your wound.  Summary  After your sutures are removed, it is common to have some discomfort and swelling in the area.  Wash your hands with soap and water before you change your bandage (dressing).  Keep the wound area dry and clean. Do not take baths, swim, or use a hot tub until your health care provider approves.  This information is not intended to replace advice given to you by your health care provider. Make sure you discuss any questions you have with your health care provider.

## 2021-09-12 NOTE — ED ADULT NURSE NOTE - NSIMPLEMENTINTERV_GEN_ALL_ED
Implemented All Fall with Harm Risk Interventions:  [place] to call system. Call bell, personal items and telephone within reach. Instruct patient to call for assistance. Room bathroom lighting operational. Non-slip footwear when patient is off stretcher. Physically safe environment: no spills, clutter or unnecessary equipment. Stretcher in lowest position, wheels locked, appropriate side rails in place. Provide visual cue, wrist band, yellow gown, etc. Monitor gait and stability. Monitor for mental status changes and reorient to person, place, and time. Review medications for side effects contributing to fall risk. Reinforce activity limits and safety measures with patient and family. Provide visual clues: red socks.

## 2021-09-12 NOTE — ED PROVIDER NOTE - PHYSICAL EXAMINATION
GENERAL: Well-developed; well-nourished; in no acute distress.   SKIN: Laceration sites over nasal bridge and L lip appear well-healed, no wound dehiscence, purulence or erythema noted at sites.   CARD: S1, S2 normal; no murmurs, gallops, or rubs. Regular rate and rhythm.   RESP: LCTAB; No wheezes, rales, rhonchi, or stridor.  NEURO: Alert, oriented, grossly unremarkable  PSYCH: Cooperative, appropriate.

## 2021-09-12 NOTE — ED PROVIDER NOTE - CARE PROVIDER_API CALL
Shan Saavedra   INTERNAL MEDICINE  [address] [place] [address]  Phone: [phone]  Fax: [phone]  Established Patient  Follow Up Time: 1-3 Days

## 2021-11-09 ENCOUNTER — APPOINTMENT (OUTPATIENT)
Dept: SURGERY | Facility: CLINIC | Age: 76
End: 2021-11-09
Payer: MEDICARE

## 2021-11-09 VITALS
HEART RATE: 68 BPM | TEMPERATURE: 97.3 F | BODY MASS INDEX: 45.32 KG/M2 | OXYGEN SATURATION: 95 % | WEIGHT: 306 LBS | HEIGHT: 69 IN

## 2021-11-09 DIAGNOSIS — L72.3 SEBACEOUS CYST: ICD-10-CM

## 2021-11-09 DIAGNOSIS — L08.9 SEBACEOUS CYST: ICD-10-CM

## 2021-11-09 PROCEDURE — 10060 I&D ABSCESS SIMPLE/SINGLE: CPT

## 2021-11-09 PROCEDURE — 11403 EXC TR-EXT B9+MARG 2.1-3CM: CPT | Mod: 59

## 2021-11-09 PROCEDURE — 99202 OFFICE O/P NEW SF 15 MIN: CPT | Mod: 25

## 2021-11-09 NOTE — CONSULT LETTER
[Dear  ___] : Dear  [unfilled], [Consult Letter:] : I had the pleasure of evaluating your patient, [unfilled]. [Please see my note below.] : Please see my note below. [Consult Closing:] : Thank you very much for allowing me to participate in the care of this patient.  If you have any questions, please do not hesitate to contact me. [Sincerely,] : Sincerely, [FreeTextEntry3] : Alverto Connors MD, FACS\par [facility]\[place] [address]\par

## 2021-11-09 NOTE — HISTORY OF PRESENT ILLNESS
[de-identified] : Patient presents to the office with a history of back abscess.  Patient also has a history of anal fistula.  He is back abscess has been hurting him and he has been to the emergency care treatment.  Patient is on antibiotics.  Patient had a cystic mass in that region for many years.

## 2021-11-09 NOTE — ASSESSMENT
[FreeTextEntry1] : After examination patient had his wife were explained about surgery for incision and drainage of the abscess and removal of cystic mass.  Patient signed the consent.  Under local anesthesia I&D of the abscess was done and removal of the cyst was done.  Wound was then packed.  Local care was explained to the wife.  Patient to continue the local care and return to the office next week for further examination.

## 2021-11-09 NOTE — REASON FOR VISIT
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

## 2021-11-09 NOTE — PHYSICAL EXAM
[Alert] : alert [Oriented to Person] : oriented to person [Oriented to Place] : oriented to place [Oriented to Time] : oriented to time [Calm] : calm [de-identified] : Patient is in somewhat pain and uncomfortable secondary to the abscess. [de-identified] : Large abscess cavity approximately 5 cm size in the upper mid back region.  Patient has lateral tenderness.  There is no active drainage.  Patient is on antibiotics.

## 2021-11-16 ENCOUNTER — APPOINTMENT (OUTPATIENT)
Dept: SURGERY | Facility: CLINIC | Age: 76
End: 2021-11-16
Payer: MEDICARE

## 2021-11-16 ENCOUNTER — APPOINTMENT (OUTPATIENT)
Dept: CARDIOLOGY | Facility: CLINIC | Age: 76
End: 2021-11-16
Payer: MEDICARE

## 2021-11-16 VITALS — HEIGHT: 69 IN | TEMPERATURE: 97.9 F | BODY MASS INDEX: 43.99 KG/M2 | WEIGHT: 297 LBS

## 2021-11-16 VITALS — HEART RATE: 68 BPM | BODY MASS INDEX: 43.99 KG/M2 | HEIGHT: 69 IN | WEIGHT: 297 LBS | OXYGEN SATURATION: 90 %

## 2021-11-16 DIAGNOSIS — Z95.0 PRESENCE OF CARDIAC PACEMAKER: ICD-10-CM

## 2021-11-16 PROCEDURE — 99213 OFFICE O/P EST LOW 20 MIN: CPT

## 2021-11-16 PROCEDURE — 93280 PM DEVICE PROGR EVAL DUAL: CPT

## 2021-11-16 PROCEDURE — 99024 POSTOP FOLLOW-UP VISIT: CPT

## 2021-11-16 NOTE — ASSESSMENT
[FreeTextEntry1] : Wound is healing well.  No active infection.  No active drainage.  Local care as needed.  Follow-up in 3 months to reevaluate for recurrence of the cyst.

## 2021-12-06 ENCOUNTER — APPOINTMENT (OUTPATIENT)
Dept: CARDIOLOGY | Facility: CLINIC | Age: 76
End: 2021-12-06
Payer: MEDICARE

## 2021-12-06 VITALS
WEIGHT: 293 LBS | HEART RATE: 62 BPM | DIASTOLIC BLOOD PRESSURE: 74 MMHG | HEIGHT: 69 IN | BODY MASS INDEX: 43.4 KG/M2 | OXYGEN SATURATION: 95 % | TEMPERATURE: 98.4 F | SYSTOLIC BLOOD PRESSURE: 104 MMHG

## 2021-12-06 PROCEDURE — 99214 OFFICE O/P EST MOD 30 MIN: CPT

## 2021-12-06 PROCEDURE — 93000 ELECTROCARDIOGRAM COMPLETE: CPT

## 2021-12-06 NOTE — PHYSICAL EXAM
[General Appearance - Well Developed] : well developed [General Appearance - Well Nourished] : well nourished [Normal Conjunctiva] : the conjunctiva exhibited no abnormalities [Normal Oral Mucosa] : normal oral mucosa [Normal Jugular Venous A Waves Present] : normal jugular venous A waves present [Normal Jugular Venous V Waves Present] : normal jugular venous V waves present [No Jugular Venous Eng A Waves] : no jugular venous eng A waves [Respiration, Rhythm And Depth] : normal respiratory rhythm and effort [Auscultation Breath Sounds / Voice Sounds] : lungs were clear to auscultation bilaterally [Chest Palpation] : palpation of the chest revealed no abnormalities [Heart Rate And Rhythm] : heart rate and rhythm were normal [Heart Sounds] : normal S1 and S2 [Arterial Pulses Normal] : the arterial pulses were normal [Abdomen Soft] : soft [Abdomen Tenderness] : non-tender [Abnormal Walk] : normal gait [Nail Clubbing] : no clubbing of the fingernails [Skin Turgor] : normal skin turgor [] : no rash [Oriented To Time, Place, And Person] : oriented to person, place, and time [Affect] : the affect was normal [Memory Recent] : recent memory was not impaired [FreeTextEntry1] : S/P cyst resection

## 2021-12-06 NOTE — REASON FOR VISIT
[FreeTextEntry1] : Patient presents for follow up with his spouse. He denies any complaints. He is trying to lose weight.

## 2021-12-06 NOTE — DISCUSSION/SUMMARY
[FreeTextEntry1] : Patient was instructed to target his T. Cholesterol to less than 200 mg/dl and LDL cholesterol to less than 70 mg/dl.\par Exercise and weight loss was advised. Patient does not exercise at all\par Maintain cardiac medications. \par Patient was advised to repeat a BMP, CBC , fasting lipid profile and hepatic panel.\par Patient was counseled on improving his HDL cholesterol through exercise.\par RV in 6 months

## 2021-12-26 NOTE — PROCEDURE
[See Scanned Paceart Report] : See scanned paceart report [See Device Printout] : See device printout [Pacemaker] : pacemaker [DDDR] : DDDR [Threshold Testing Performed] : Threshold testing was performed [Programmed for Longevity] : output reprogrammed for improved battery longevity [de-identified] : Pacer Dependant, no underlying at 30bpm [de-identified] : MAURILIO [de-identified] : Arleth [de-identified] : [id number] [de-identified] : 10/31/14 [de-identified] : 60/130 [de-identified] : Normal Device Function .

## 2022-02-15 ENCOUNTER — APPOINTMENT (OUTPATIENT)
Dept: SURGERY | Facility: CLINIC | Age: 77
End: 2022-02-15

## 2022-04-15 ENCOUNTER — RX RENEWAL (OUTPATIENT)
Age: 77
End: 2022-04-15

## 2022-05-11 ENCOUNTER — APPOINTMENT (OUTPATIENT)
Dept: CARDIOLOGY | Facility: CLINIC | Age: 77
End: 2022-05-11

## 2022-05-11 ENCOUNTER — APPOINTMENT (OUTPATIENT)
Dept: CARDIOLOGY | Facility: CLINIC | Age: 77
End: 2022-05-11
Payer: MEDICARE

## 2022-05-11 VITALS
HEIGHT: 63 IN | SYSTOLIC BLOOD PRESSURE: 125 MMHG | BODY MASS INDEX: 50.85 KG/M2 | WEIGHT: 287 LBS | DIASTOLIC BLOOD PRESSURE: 80 MMHG | TEMPERATURE: 98.2 F

## 2022-05-11 PROCEDURE — 99214 OFFICE O/P EST MOD 30 MIN: CPT

## 2022-05-11 PROCEDURE — 93000 ELECTROCARDIOGRAM COMPLETE: CPT

## 2022-05-11 NOTE — DISCUSSION/SUMMARY
[FreeTextEntry1] : Patient was instructed to target his T. Cholesterol to less than 200 mg/dl and LDL cholesterol to less than 70 mg/dl.\par Exercise and weight loss was advised. Patient does not exercise at all.\par He is attending physical therapy to at least become more mobile and active.\par Maintain cardiac medications. \par Patient was advised to repeat a BMP, CBC , fasting lipid profile and hepatic panel.\par Patient was counseled on improving his HDL cholesterol through exercise.\par RV in 6 months

## 2022-05-17 ENCOUNTER — APPOINTMENT (OUTPATIENT)
Dept: CARDIOLOGY | Facility: CLINIC | Age: 77
End: 2022-05-17
Payer: MEDICARE

## 2022-05-17 VITALS
WEIGHT: 285 LBS | BODY MASS INDEX: 42.21 KG/M2 | HEART RATE: 85 BPM | SYSTOLIC BLOOD PRESSURE: 106 MMHG | DIASTOLIC BLOOD PRESSURE: 67 MMHG | TEMPERATURE: 97.7 F | HEIGHT: 69 IN

## 2022-05-17 PROCEDURE — 93280 PM DEVICE PROGR EVAL DUAL: CPT

## 2022-05-17 RX ORDER — TRAZODONE HYDROCHLORIDE 50 MG/1
50 TABLET ORAL
Qty: 90 | Refills: 0 | Status: COMPLETED | COMMUNITY
Start: 2021-11-03 | End: 2022-05-17

## 2022-05-17 RX ORDER — CEPHALEXIN 500 MG/1
500 CAPSULE ORAL
Qty: 21 | Refills: 0 | Status: COMPLETED | COMMUNITY
Start: 2021-11-04 | End: 2022-05-17

## 2022-05-17 NOTE — PROCEDURE
[See Scanned Paceart Report] : See scanned paceart report [See Device Printout] : See device printout [Pacemaker] : pacemaker [DDDR] : DDDR [Voltage: ___ volts] : Voltage was [unfilled] volts [Impedance: ___ Ohms] : current cell impedance is [unfilled] Ohms [Longevity: ___ months] : The estimated remaining battery life is [unfilled] months [Threshold Testing Performed] : Threshold testing was performed [Lead Imp:  ___ohms] : lead impedance was [unfilled] ohms [Sensing Amplitude ___mv] : sensing amplitude was [unfilled] mv [___V @] : [unfilled] V [___ ms] : [unfilled] ms [Programmed for Longevity] : output reprogrammed for improved battery longevity [Asense-Vsense ___ %] : Asense-Vsense [unfilled]% [Asense-Vpace ___ %] : Asense-Vpace [unfilled]% [Apace-Vsense ___ %] : Apace-Vsense [unfilled]% [Apace-Vpace ___ %] : Apace-Vpace [unfilled]% [de-identified] : Pacer Dependant, no underlying at 30bpm [de-identified] : MAURILIO [de-identified] : Arleth [de-identified] : [id number] [de-identified] : 10/31/14 [de-identified] : 60/130 [de-identified] : 8 months remaining in battery\par

## 2022-06-28 ENCOUNTER — APPOINTMENT (OUTPATIENT)
Dept: ORTHOPEDIC SURGERY | Facility: CLINIC | Age: 77
End: 2022-06-28

## 2022-07-02 ENCOUNTER — NON-APPOINTMENT (OUTPATIENT)
Age: 77
End: 2022-07-02

## 2022-08-03 ENCOUNTER — APPOINTMENT (OUTPATIENT)
Dept: ORTHOPEDIC SURGERY | Facility: CLINIC | Age: 77
End: 2022-08-03

## 2022-08-06 ENCOUNTER — APPOINTMENT (OUTPATIENT)
Dept: ORTHOPEDIC SURGERY | Facility: CLINIC | Age: 77
End: 2022-08-06

## 2022-08-06 VITALS — WEIGHT: 285 LBS | HEIGHT: 69 IN | BODY MASS INDEX: 42.21 KG/M2

## 2022-08-06 PROCEDURE — 99213 OFFICE O/P EST LOW 20 MIN: CPT

## 2022-08-06 NOTE — ASSESSMENT
[FreeTextEntry1] :  he has done well from the gel injection and has lost some weight think it is reasonable to repeat it will get approval on how come back in the next month

## 2022-08-06 NOTE — REASON FOR VISIT
[Spouse] : spouse [FreeTextEntry2] : Left Knee pain\par Weight is coming down slowly to 80 had last Synvisc injection 10 2020 still using a cane medical conditions stable still seeing Dr. Saavedra

## 2022-08-06 NOTE — HISTORY OF PRESENT ILLNESS
[de-identified] : Patient Complaint - left knee pain has been stable flared up in the last month has put on weight still has a quad cane\par mostly for his stroke gel injection was back in October 2020\par History of Present Illness\par 77-year-old gentleman for follow-up of his left knee pain longstanding issues some pain in the right the right had gel\par injection 4 years ago which helped recently been having some pain again does have a cane for multiple medical\par comorbidities he did have bariatric surgery when his weight was up to touch 400 currently 295 he has also had open\par heart surgery and circulatory issues in his legs.  is current medical doctor \par he has hypertension diabetes and hypothyroidism does take baby aspirin did have a gel injection 2  years ago which\par gave him some relief does use a knee sleeve has lost 10 lb is using a cane

## 2022-08-06 NOTE — IMAGING
[de-identified] :  swelling left knee limited motion crepitus antalgic gait venous stasis changes in leg some edema no open wounds or ulcers does have a knee sleeve

## 2022-08-11 ENCOUNTER — APPOINTMENT (OUTPATIENT)
Dept: CARDIOLOGY | Facility: CLINIC | Age: 77
End: 2022-08-11

## 2022-08-11 VITALS
DIASTOLIC BLOOD PRESSURE: 80 MMHG | WEIGHT: 280 LBS | SYSTOLIC BLOOD PRESSURE: 130 MMHG | HEIGHT: 69 IN | TEMPERATURE: 98.4 F | HEART RATE: 78 BPM | BODY MASS INDEX: 41.47 KG/M2

## 2022-08-11 DIAGNOSIS — I82.409 ACUTE EMBOLISM AND THROMBOSIS OF UNSPECIFIED DEEP VEINS OF UNSPECIFIED LOWER EXTREMITY: ICD-10-CM

## 2022-08-11 PROCEDURE — 99214 OFFICE O/P EST MOD 30 MIN: CPT | Mod: 25

## 2022-08-11 PROCEDURE — 93000 ELECTROCARDIOGRAM COMPLETE: CPT

## 2022-08-11 NOTE — DISCUSSION/SUMMARY
[FreeTextEntry1] : Patient was instructed to target his T. Cholesterol to less than 200 mg/dl and LDL cholesterol to less than 70 mg/dl.\par Exercise and weight loss was advised. Patient does not exercise at all.\par He is attending physical therapy to at least become more mobile and active.\par Maintain cardiac medications. \par Patient was advised to repeat a BMP, CBC , fasting lipid profile and hepatic panel.\par Patient was counseled on improving his HDL cholesterol through exercise.\par RV in 3 months

## 2022-08-24 NOTE — ED ADULT TRIAGE NOTE - PAIN RATING/NUMBER SCALE (0-10): ACTIVITY
Deep vein thrombosis (DVT) Deep vein thrombosis (DVT) Deep vein thrombosis (DVT) Deep vein thrombosis (DVT) Deep vein thrombosis (DVT) Deep vein thrombosis (DVT) Deep vein thrombosis (DVT) Deep vein thrombosis (DVT) Deep vein thrombosis (DVT) Deep vein thrombosis (DVT) 0

## 2022-09-20 ENCOUNTER — APPOINTMENT (OUTPATIENT)
Dept: CARDIOLOGY | Facility: CLINIC | Age: 77
End: 2022-09-20

## 2022-09-20 VITALS
BODY MASS INDEX: 42.95 KG/M2 | SYSTOLIC BLOOD PRESSURE: 110 MMHG | HEART RATE: 65 BPM | HEIGHT: 69 IN | DIASTOLIC BLOOD PRESSURE: 75 MMHG | TEMPERATURE: 97.9 F | RESPIRATION RATE: 16 BRPM | WEIGHT: 290 LBS

## 2022-09-20 PROCEDURE — 99215 OFFICE O/P EST HI 40 MIN: CPT | Mod: 57

## 2022-09-20 PROCEDURE — 93280 PM DEVICE PROGR EVAL DUAL: CPT

## 2022-09-20 NOTE — PROCEDURE
[VVI] : VVI [Voltage: ___ volts] : Voltage was [unfilled] volts [Magnet Rate: ___ Ppm] : magnet rate was [unfilled] Ppm [Longevity: ___ months] : The estimated remaining battery life is [unfilled] months [Pace ___ %] : Pace [unfilled]% [de-identified] : Medtronic [de-identified] : ADDR01 [de-identified] : [id number] [de-identified] : 10/31/14 [de-identified] : 65 [de-identified] : Device has reached KT on 8/19/22 [de-identified] : Device has reached KT on 8/19/22. Pacer dependent.

## 2022-09-20 NOTE — HISTORY OF PRESENT ILLNESS
[Erythema at Site] : no erythema at device site [de-identified] : 76 y/o male with a PPM/ CHB , presents for routine device interrogation. \par

## 2022-09-23 ENCOUNTER — OUTPATIENT (OUTPATIENT)
Dept: OUTPATIENT SERVICES | Facility: HOSPITAL | Age: 77
LOS: 1 days | Discharge: HOME | End: 2022-09-23

## 2022-09-23 VITALS
WEIGHT: 299.83 LBS | RESPIRATION RATE: 18 BRPM | HEART RATE: 58 BPM | SYSTOLIC BLOOD PRESSURE: 123 MMHG | TEMPERATURE: 97 F | OXYGEN SATURATION: 99 % | HEIGHT: 72 IN | DIASTOLIC BLOOD PRESSURE: 58 MMHG

## 2022-09-23 DIAGNOSIS — Z95.1 PRESENCE OF AORTOCORONARY BYPASS GRAFT: Chronic | ICD-10-CM

## 2022-09-23 DIAGNOSIS — T82.111D BREAKDOWN (MECHANICAL) OF CARDIAC PULSE GENERATOR (BATTERY), SUBSEQUENT ENCOUNTER: ICD-10-CM

## 2022-09-23 DIAGNOSIS — Z98.84 BARIATRIC SURGERY STATUS: Chronic | ICD-10-CM

## 2022-09-23 DIAGNOSIS — Z95.0 PRESENCE OF CARDIAC PACEMAKER: Chronic | ICD-10-CM

## 2022-09-23 DIAGNOSIS — Z98.890 OTHER SPECIFIED POSTPROCEDURAL STATES: Chronic | ICD-10-CM

## 2022-09-23 DIAGNOSIS — Z01.818 ENCOUNTER FOR OTHER PREPROCEDURAL EXAMINATION: ICD-10-CM

## 2022-09-23 LAB
ALBUMIN SERPL ELPH-MCNC: 4.4 G/DL — SIGNIFICANT CHANGE UP (ref 3.5–5.2)
ALP SERPL-CCNC: 101 U/L — SIGNIFICANT CHANGE UP (ref 30–115)
ALT FLD-CCNC: 6 U/L — SIGNIFICANT CHANGE UP (ref 0–41)
ANION GAP SERPL CALC-SCNC: 10 MMOL/L — SIGNIFICANT CHANGE UP (ref 7–14)
APPEARANCE UR: CLEAR — SIGNIFICANT CHANGE UP
APTT BLD: 30.4 SEC — SIGNIFICANT CHANGE UP (ref 27–39.2)
AST SERPL-CCNC: 10 U/L — SIGNIFICANT CHANGE UP (ref 0–41)
BASOPHILS # BLD AUTO: 0.03 K/UL — SIGNIFICANT CHANGE UP (ref 0–0.2)
BASOPHILS NFR BLD AUTO: 0.4 % — SIGNIFICANT CHANGE UP (ref 0–1)
BILIRUB SERPL-MCNC: 0.7 MG/DL — SIGNIFICANT CHANGE UP (ref 0.2–1.2)
BILIRUB UR-MCNC: NEGATIVE — SIGNIFICANT CHANGE UP
BUN SERPL-MCNC: 23 MG/DL — HIGH (ref 10–20)
CALCIUM SERPL-MCNC: 9.1 MG/DL — SIGNIFICANT CHANGE UP (ref 8.4–10.5)
CHLORIDE SERPL-SCNC: 100 MMOL/L — SIGNIFICANT CHANGE UP (ref 98–110)
CO2 SERPL-SCNC: 27 MMOL/L — SIGNIFICANT CHANGE UP (ref 17–32)
COLOR SPEC: SIGNIFICANT CHANGE UP
CREAT SERPL-MCNC: 1 MG/DL — SIGNIFICANT CHANGE UP (ref 0.7–1.5)
DIFF PNL FLD: NEGATIVE — SIGNIFICANT CHANGE UP
EGFR: 77 ML/MIN/1.73M2 — SIGNIFICANT CHANGE UP
EOSINOPHIL # BLD AUTO: 0.15 K/UL — SIGNIFICANT CHANGE UP (ref 0–0.7)
EOSINOPHIL NFR BLD AUTO: 1.9 % — SIGNIFICANT CHANGE UP (ref 0–8)
GLUCOSE SERPL-MCNC: 77 MG/DL — SIGNIFICANT CHANGE UP (ref 70–99)
GLUCOSE UR QL: NEGATIVE — SIGNIFICANT CHANGE UP
HCT VFR BLD CALC: 47.2 % — SIGNIFICANT CHANGE UP (ref 42–52)
HGB BLD-MCNC: 14.6 G/DL — SIGNIFICANT CHANGE UP (ref 14–18)
IMM GRANULOCYTES NFR BLD AUTO: 0.3 % — SIGNIFICANT CHANGE UP (ref 0.1–0.3)
INR BLD: 0.97 RATIO — SIGNIFICANT CHANGE UP (ref 0.65–1.3)
KETONES UR-MCNC: NEGATIVE — SIGNIFICANT CHANGE UP
LEUKOCYTE ESTERASE UR-ACNC: NEGATIVE — SIGNIFICANT CHANGE UP
LYMPHOCYTES # BLD AUTO: 2.02 K/UL — SIGNIFICANT CHANGE UP (ref 1.2–3.4)
LYMPHOCYTES # BLD AUTO: 26.1 % — SIGNIFICANT CHANGE UP (ref 20.5–51.1)
MCHC RBC-ENTMCNC: 30 PG — SIGNIFICANT CHANGE UP (ref 27–31)
MCHC RBC-ENTMCNC: 30.9 G/DL — LOW (ref 32–37)
MCV RBC AUTO: 97.1 FL — HIGH (ref 80–94)
MONOCYTES # BLD AUTO: 0.72 K/UL — HIGH (ref 0.1–0.6)
MONOCYTES NFR BLD AUTO: 9.3 % — SIGNIFICANT CHANGE UP (ref 1.7–9.3)
MRSA PCR RESULT.: NEGATIVE — SIGNIFICANT CHANGE UP
NEUTROPHILS # BLD AUTO: 4.79 K/UL — SIGNIFICANT CHANGE UP (ref 1.4–6.5)
NEUTROPHILS NFR BLD AUTO: 62 % — SIGNIFICANT CHANGE UP (ref 42.2–75.2)
NITRITE UR-MCNC: NEGATIVE — SIGNIFICANT CHANGE UP
NRBC # BLD: 0 /100 WBCS — SIGNIFICANT CHANGE UP (ref 0–0)
PH UR: 6 — SIGNIFICANT CHANGE UP (ref 5–8)
PLATELET # BLD AUTO: 135 K/UL — SIGNIFICANT CHANGE UP (ref 130–400)
POTASSIUM SERPL-MCNC: 5.4 MMOL/L — HIGH (ref 3.5–5)
POTASSIUM SERPL-SCNC: 5.4 MMOL/L — HIGH (ref 3.5–5)
PROT SERPL-MCNC: 6.8 G/DL — SIGNIFICANT CHANGE UP (ref 6–8)
PROT UR-MCNC: NEGATIVE — SIGNIFICANT CHANGE UP
PROTHROM AB SERPL-ACNC: 11.2 SEC — SIGNIFICANT CHANGE UP (ref 9.95–12.87)
RBC # BLD: 4.86 M/UL — SIGNIFICANT CHANGE UP (ref 4.7–6.1)
RBC # FLD: 12.4 % — SIGNIFICANT CHANGE UP (ref 11.5–14.5)
SODIUM SERPL-SCNC: 137 MMOL/L — SIGNIFICANT CHANGE UP (ref 135–146)
SP GR SPEC: 1.01 — SIGNIFICANT CHANGE UP (ref 1.01–1.03)
UROBILINOGEN FLD QL: SIGNIFICANT CHANGE UP
WBC # BLD: 7.73 K/UL — SIGNIFICANT CHANGE UP (ref 4.8–10.8)
WBC # FLD AUTO: 7.73 K/UL — SIGNIFICANT CHANGE UP (ref 4.8–10.8)

## 2022-09-23 PROCEDURE — 93010 ELECTROCARDIOGRAM REPORT: CPT

## 2022-09-23 NOTE — H&P PST ADULT - NSICDXPASTMEDICALHX_GEN_ALL_CORE_FT
PAST MEDICAL HISTORY:  BPH (benign prostatic hyperplasia)     CAD (coronary artery disease)     CVA (cerebral vascular accident)     DVT (deep venous thrombosis)     HTN (hypertension)     Hypercholesterolemia     Obesity     Pacemaker

## 2022-09-23 NOTE — H&P PST ADULT - NSICDXPASTSURGICALHX_GEN_ALL_CORE_FT
PAST SURGICAL HISTORY:  Pacemaker 2010 2017    S/P CABG (coronary artery bypass graft)     S/P gastric bypass 2001    S/P primary angioplasty

## 2022-09-23 NOTE — H&P PST ADULT - HISTORY OF PRESENT ILLNESS
Pt denies cp palp uri cough dysuria b. ET: 1 FOS- SOB -unable to climb stairs-uses cane hx CVA   ET 1 flat block /unstable to walk distance r/t cva    Citizen of [place] speaking. daughter with patient in past today.poor historian reegarding medication and hx surgically     PT denies any open wounds, drainage or rashes. -denies recent cough fever or infection. aware to self quaerantine preop. all instructions reviewed  schedule for pacemaker battery change  9/28 dr curtis. hx cabg  cva , htn  .  denies pain    Citizen of [place] speaking-utilized daughter babatunde for translation services.     poor historian regarding meds ands dates    advised hold lisinopril 24 hours prior. pt compliant    advised continue asa daily.    As per patient, this is their complete medical and surgical history, including medications both prescribed or over the counter.  Patient verbalized understanding of instructions and was given the opportunity to ask questions and have them answered.    Patient denies any signs or symptoms of COVID 19 and denies contact with known positive individuals.  They have an appointment for repeat COVID testing pre-procedure and acknowledge its time and place.  They were instructed to quarantine pre-procedure, practice exposure control measures, continue to self-monitor and report any concerns to their proceduralist.    Anesthesia Alert  NO--Difficult Airway  NO--History of neck surgery or radiation  NO--Limited ROM of neck  NO--History of Malignant hyperthermia  NO--Personal or family history of Pseudocholinesterase deficiency.  NO--Prior Anesthesia Complication  NO--Latex Allergy  NO--Loose teeth  NO--History of Rheumatoid Arthritis  NO--ATIYA  yes--Bleeding risk   asa daily  NO--Other_____

## 2022-09-24 LAB
CULTURE RESULTS: SIGNIFICANT CHANGE UP
SPECIMEN SOURCE: SIGNIFICANT CHANGE UP

## 2022-09-28 ENCOUNTER — OUTPATIENT (OUTPATIENT)
Dept: OUTPATIENT SERVICES | Facility: HOSPITAL | Age: 77
LOS: 1 days | Discharge: HOME | End: 2022-09-28

## 2022-09-28 VITALS — HEIGHT: 69 IN | WEIGHT: 300.05 LBS

## 2022-09-28 DIAGNOSIS — T82.111A BREAKDOWN (MECHANICAL) OF CARDIAC PULSE GENERATOR (BATTERY), INITIAL ENCOUNTER: ICD-10-CM

## 2022-09-28 DIAGNOSIS — Z95.0 PRESENCE OF CARDIAC PACEMAKER: Chronic | ICD-10-CM

## 2022-09-28 DIAGNOSIS — Z95.1 PRESENCE OF AORTOCORONARY BYPASS GRAFT: Chronic | ICD-10-CM

## 2022-09-28 DIAGNOSIS — Z98.890 OTHER SPECIFIED POSTPROCEDURAL STATES: Chronic | ICD-10-CM

## 2022-09-28 DIAGNOSIS — Z98.84 BARIATRIC SURGERY STATUS: Chronic | ICD-10-CM

## 2022-09-28 PROCEDURE — 33228 REMV&REPLC PM GEN DUAL LEAD: CPT

## 2022-09-28 RX ORDER — CEFAZOLIN SODIUM 1 G
2000 VIAL (EA) INJECTION ONCE
Refills: 0 | Status: COMPLETED | OUTPATIENT
Start: 2022-09-28 | End: 2022-09-28

## 2022-09-28 RX ORDER — CEPHALEXIN 500 MG
1 CAPSULE ORAL
Qty: 14 | Refills: 0
Start: 2022-09-28 | End: 2022-10-04

## 2022-09-28 RX ADMIN — Medication 100 MILLIGRAM(S): at 11:18

## 2022-09-28 RX ADMIN — Medication 100 MILLIGRAM(S): at 11:43

## 2022-09-28 NOTE — PROGRESS NOTE ADULT - SUBJECTIVE AND OBJECTIVE BOX
Electrophysiology Brief Post-Op Note    I have personally seen and examined the patient.  I agree with the history and physical which I have reviewed and noted any changes below.  09-28-22 @ 07:27    PRE-OP DIAGNOSIS: bradycardia    POST-OP DIAGNOSIS: bradycardia    PROCEDURE: generator change    Vendor Representative was present for clinical support.    Physician: Danielle  Assistant: None    ESTIMATED BLOOD LOSS: 5      mL    ANESTHESIA TYPE:  [  ]General Anesthesia  [ X ] Sedation  [ X ] Local/Regional    CONDITION  [  ] Critical  [  ] Serious  [  ]Fair  [X  ]Good      SPECIMENS REMOVED (IF APPLICABLE):  PPM    IMPLANTS (IF APPLICABLE)  PPM    FINDINGS: none    COMPLICATIONS: none     PLAN OF CARE  - Kelfex  - FU in 3-4 weeks

## 2022-09-29 PROBLEM — I10 ESSENTIAL (PRIMARY) HYPERTENSION: Chronic | Status: ACTIVE | Noted: 2022-09-23

## 2022-09-29 PROBLEM — I25.10 ATHEROSCLEROTIC HEART DISEASE OF NATIVE CORONARY ARTERY WITHOUT ANGINA PECTORIS: Chronic | Status: ACTIVE | Noted: 2022-09-23

## 2022-09-29 PROBLEM — N40.0 BENIGN PROSTATIC HYPERPLASIA WITHOUT LOWER URINARY TRACT SYMPTOMS: Chronic | Status: ACTIVE | Noted: 2022-09-23

## 2022-09-29 PROBLEM — E78.00 PURE HYPERCHOLESTEROLEMIA, UNSPECIFIED: Chronic | Status: ACTIVE | Noted: 2022-09-23

## 2022-09-29 PROBLEM — I82.409 ACUTE EMBOLISM AND THROMBOSIS OF UNSPECIFIED DEEP VEINS OF UNSPECIFIED LOWER EXTREMITY: Chronic | Status: ACTIVE | Noted: 2022-09-23

## 2022-09-29 PROBLEM — Z95.0 PRESENCE OF CARDIAC PACEMAKER: Chronic | Status: ACTIVE | Noted: 2022-09-23

## 2022-09-29 PROBLEM — I63.9 CEREBRAL INFARCTION, UNSPECIFIED: Chronic | Status: ACTIVE | Noted: 2022-09-23

## 2022-09-29 PROBLEM — E66.9 OBESITY, UNSPECIFIED: Chronic | Status: ACTIVE | Noted: 2022-09-23

## 2022-09-30 DIAGNOSIS — E66.9 OBESITY, UNSPECIFIED: ICD-10-CM

## 2022-09-30 DIAGNOSIS — Z45.010 ENCOUNTER FOR CHECKING AND TESTING OF CARDIAC PACEMAKER PULSE GENERATOR [BATTERY]: ICD-10-CM

## 2022-09-30 DIAGNOSIS — I44.2 ATRIOVENTRICULAR BLOCK, COMPLETE: ICD-10-CM

## 2022-09-30 DIAGNOSIS — I25.10 ATHEROSCLEROTIC HEART DISEASE OF NATIVE CORONARY ARTERY WITHOUT ANGINA PECTORIS: ICD-10-CM

## 2022-09-30 DIAGNOSIS — Z95.1 PRESENCE OF AORTOCORONARY BYPASS GRAFT: ICD-10-CM

## 2022-09-30 DIAGNOSIS — I10 ESSENTIAL (PRIMARY) HYPERTENSION: ICD-10-CM

## 2022-09-30 DIAGNOSIS — Z79.82 LONG TERM (CURRENT) USE OF ASPIRIN: ICD-10-CM

## 2022-09-30 DIAGNOSIS — E78.00 PURE HYPERCHOLESTEROLEMIA, UNSPECIFIED: ICD-10-CM

## 2022-10-14 ENCOUNTER — APPOINTMENT (OUTPATIENT)
Dept: ORTHOPEDIC SURGERY | Facility: CLINIC | Age: 77
End: 2022-10-14
Payer: MEDICARE

## 2022-10-14 PROCEDURE — 20610 DRAIN/INJ JOINT/BURSA W/O US: CPT | Mod: 1L,50

## 2022-10-14 PROCEDURE — 99213 OFFICE O/P EST LOW 20 MIN: CPT

## 2022-10-15 NOTE — ASSESSMENT
[FreeTextEntry1] :  recommended new quad cane we discussed the option of gel injections to both knees he was agreeable I  discussed with patient today the  option of a  Visco supplement injection to the knees.  risks and benefits were  reviewed a  consent was  given,  with sterile technique through a lateral approach the gel injections  Synvisc-One were delivered and  tolerated well. a Band-Aids was applied\par   I will see him back when he returns from [place] in February

## 2022-10-15 NOTE — HISTORY OF PRESENT ILLNESS
[de-identified] : Patient Complaint - left knee pain has been stable flared up  few months ago  has put on weight still has a quad cane\par mostly for his stroke gel injection was back in October 2020\par History of Present Illness\par 77-year-old gentleman for follow-up of his left knee pain longstanding issues some pain in the right the right had gel\par injection 4 years ago which helped recently been having some pain again does have a cane for multiple medical\par comorbidities he did have bariatric surgery when his weight was up to touch 400 currently 295 he has also had open\par heart surgery and circulatory issues in his legs.  is current medical doctor \par he has hypertension diabetes and hypothyroidism does take baby aspirin did have a gel injection 2  years ago which\par gave him some relief does use a knee sleeve has lost 10 lb is using a cane\par  since last visit the right knee has been giving him a lot more pain as well

## 2022-10-15 NOTE — REASON FOR VISIT
[Spouse] : spouse [FreeTextEntry2] : follow up for left and right knee pain Not much change in his weight did well left knee gel in the past is interested in considering both today

## 2022-10-15 NOTE — IMAGING
[de-identified] :  swelling left and right knee limited motion crepitus antalgic gait venous stasis changes in leg some edema no open wounds or ulcers does have a knee sleeve

## 2022-10-28 ENCOUNTER — APPOINTMENT (OUTPATIENT)
Dept: CARDIOLOGY | Facility: CLINIC | Age: 77
End: 2022-10-28

## 2022-10-28 VITALS
DIASTOLIC BLOOD PRESSURE: 71 MMHG | HEIGHT: 69 IN | HEART RATE: 62 BPM | TEMPERATURE: 97.3 F | SYSTOLIC BLOOD PRESSURE: 112 MMHG | BODY MASS INDEX: 42.95 KG/M2 | WEIGHT: 290 LBS

## 2022-10-28 PROCEDURE — 99215 OFFICE O/P EST HI 40 MIN: CPT | Mod: 24

## 2022-10-28 PROCEDURE — 93280 PM DEVICE PROGR EVAL DUAL: CPT

## 2022-10-28 RX ORDER — CEPHALEXIN 500 MG/1
500 TABLET ORAL
Qty: 10 | Refills: 0 | Status: COMPLETED | COMMUNITY
Start: 2022-09-28 | End: 2022-10-28

## 2022-11-16 ENCOUNTER — APPOINTMENT (OUTPATIENT)
Dept: CARDIOLOGY | Facility: CLINIC | Age: 77
End: 2022-11-16

## 2022-11-16 VITALS
HEART RATE: 78 BPM | HEIGHT: 69 IN | SYSTOLIC BLOOD PRESSURE: 140 MMHG | DIASTOLIC BLOOD PRESSURE: 70 MMHG | WEIGHT: 287 LBS | BODY MASS INDEX: 42.51 KG/M2

## 2022-11-16 PROCEDURE — 99214 OFFICE O/P EST MOD 30 MIN: CPT | Mod: 25

## 2022-11-16 PROCEDURE — 93000 ELECTROCARDIOGRAM COMPLETE: CPT

## 2022-11-16 NOTE — ASSESSMENT
[FreeTextEntry1] : ASHD\par S/P CABG\par S/P PTCA/stent of RCA\par S/P PPM\par DM\par Hyperlipidemia\par Obesity

## 2022-11-16 NOTE — PHYSICAL EXAM
[General Appearance - Well Developed] : well developed [Normal Appearance] : normal appearance [Well Groomed] : well groomed [General Appearance - Well Nourished] : well nourished [No Deformities] : no deformities [General Appearance - In No Acute Distress] : no acute distress [Heart Rate And Rhythm] : heart rate and rhythm were normal [Heart Sounds] : normal S1 and S2 [Murmurs] : no murmurs present [Respiration, Rhythm And Depth] : normal respiratory rhythm and effort [Exaggerated Use Of Accessory Muscles For Inspiration] : no accessory muscle use [Auscultation Breath Sounds / Voice Sounds] : lungs were clear to auscultation bilaterally [Clean] : clean [Dry] : dry [Well-Healed] : well-healed [Abdomen Soft] : soft [Abdomen Tenderness] : non-tender [Abdomen Mass (___ Cm)] : no abdominal mass palpated [Nail Clubbing] : no clubbing of the fingernails [Cyanosis, Localized] : no localized cyanosis [Petechial Hemorrhages (___cm)] : no petechial hemorrhages [] : no ischemic changes [FreeTextEntry1] : +3 edema more than left.

## 2022-11-16 NOTE — DISCUSSION/SUMMARY
[FreeTextEntry1] : Patient was instructed to target his T. Cholesterol to less than 200 mg/dl and LDL cholesterol to less than 70 mg/dl.\par Exercise and weight loss was advised. Patient does not exercise at all.\par He is attending physical therapy to at least become more mobile and active.\par Maintain cardiac medications. \par EKG: Fully paced beats.\par Patient was advised to repeat a BMP, CBC , fasting lipid profile and hepatic panel.\par Patient was counseled on improving his HDL cholesterol through exercise.\par RV in 6 months

## 2022-11-16 NOTE — ADDENDUM
[FreeTextEntry1] : I, Dereck Meneses assisted in documentation on 10/28/2022  acting as a scribe for Dr. Lenard Santos.\par

## 2022-11-16 NOTE — ASSESSMENT
[FreeTextEntry1] : Generator Implant\par \par I have explained the risks and benefits of the procedure to the patient.  There is approximately 1% chance of any major cardiovascular complication to occur. Complications include, but are not limited to infection, bleeding, damage to the vessels, pneumothorax, stroke, death and heart attack.  The patient understands the risk and would like to proceed with the procedure. Patient indicated that all of his questions were answered to his satisfaction and verbalized understanding.\par \par Complete Heart Block\par - Normal Dual Chamber Pacemaker function. \par \par Hypertension\par - Patient blood pressure well controlled\par - Continue Lisinopril 10 mg daily.  \par \par Leg Edema\par - Patient has chronic leg edema\par - Increase Lasix from 20 mg to 40 mg daily. \par - Patient will follow-up with Cardiology. \par \par I, Lenard Santos, personally performed the services described in this documentation. All medical record entries made by the scribe/nurse CTA were at my direction and in my presence. I have reviewed the chart and agree that the record reflects my personal performance and is accurate and complete.\par \par

## 2022-11-16 NOTE — HISTORY OF PRESENT ILLNESS
[Erythema at Site] : no erythema at device site [de-identified] : 78 y/o male with a PPM/ CHB , presents for routine device interrogation. \par \par Patient s/p Generator change. Healed well, no signs of infection.\par \par Patient complains of chronic leg edema, but in his opinion it is a little more than before.\par

## 2022-11-16 NOTE — PROCEDURE
[See Scanned Paceart Report] : See scanned paceart report [See Device Printout] : See device printout [Pacemaker] : pacemaker [DDDR] : DDDR [Voltage: ___ volts] : Voltage was [unfilled] volts [Impedance: ___ Ohms] : current cell impedance is [unfilled] Ohms [Longevity: ___ months] : The estimated remaining battery life is [unfilled] months [Threshold Testing Performed] : Threshold testing was performed [Lead Imp:  ___ohms] : lead impedance was [unfilled] ohms [Sensing Amplitude ___mv] : sensing amplitude was [unfilled] mv [___V @] : [unfilled] V [___ ms] : [unfilled] ms [Programmed for Longevity] : output reprogrammed for improved battery longevity [Asense-Vsense ___ %] : Asense-Vsense [unfilled]% [Asense-Vpace ___ %] : Asense-Vpace [unfilled]% [Apace-Vsense ___ %] : Apace-Vsense [unfilled]% [Apace-Vpace ___ %] : Apace-Vpace [unfilled]% [de-identified] : Pacer Dependant, no underlying at 30bpm [de-identified] : MAURILIO [de-identified] : [id number] [de-identified] : 9/28/22 [de-identified] : 60/130 [de-identified] : No events\par Slightly elevated RV threshold, though stable at 2.5v@0.4ms\par

## 2022-11-28 ENCOUNTER — APPOINTMENT (OUTPATIENT)
Dept: CARDIOLOGY | Facility: CLINIC | Age: 77
End: 2022-11-28

## 2022-11-28 VITALS
DIASTOLIC BLOOD PRESSURE: 76 MMHG | WEIGHT: 295 LBS | HEART RATE: 87 BPM | BODY MASS INDEX: 43.69 KG/M2 | SYSTOLIC BLOOD PRESSURE: 124 MMHG | TEMPERATURE: 98 F | HEIGHT: 69 IN | RESPIRATION RATE: 16 BRPM

## 2022-11-28 DIAGNOSIS — Z48.89 ENCOUNTER FOR OTHER SPECIFIED SURGICAL AFTERCARE: ICD-10-CM

## 2022-11-28 PROCEDURE — 93280 PM DEVICE PROGR EVAL DUAL: CPT

## 2022-11-28 PROCEDURE — 99024 POSTOP FOLLOW-UP VISIT: CPT

## 2022-11-28 PROCEDURE — 93000 ELECTROCARDIOGRAM COMPLETE: CPT | Mod: 59

## 2022-11-28 RX ORDER — FAMOTIDINE 40 MG/1
40 TABLET, FILM COATED ORAL DAILY
Refills: 0 | Status: COMPLETED | COMMUNITY
End: 2022-11-28

## 2022-11-28 RX ORDER — LISINOPRIL 10 MG/1
10 TABLET ORAL DAILY
Refills: 0 | Status: COMPLETED | COMMUNITY
End: 2022-11-28

## 2022-11-28 RX ORDER — CIPROFLOXACIN HYDROCHLORIDE 500 MG/1
500 TABLET, FILM COATED ORAL
Qty: 14 | Refills: 0 | Status: COMPLETED | COMMUNITY
Start: 2022-10-16 | End: 2022-11-28

## 2022-12-12 PROBLEM — Z48.89 ENCOUNTER FOR POSTOPERATIVE WOUND CHECK: Status: ACTIVE | Noted: 2022-12-12

## 2022-12-12 NOTE — PHYSICAL EXAM
[General Appearance - Well Developed] : well developed [Left Infraclavicular] : left infraclavicular area [Heart Rate And Rhythm] : heart rate and rhythm were normal [Heart Sounds] : normal S1 and S2 [Edema] : no peripheral edema present [] : no respiratory distress [Clean] : clean [Dry] : dry [Well-Healed] : well-healed [Abdomen Soft] : soft

## 2022-12-12 NOTE — ASSESSMENT
[FreeTextEntry1] : H/o sinus node dysfunction, permanent AV Block , Had Dual chamber PPM/KT\par s/p pacemaker generator changed on 9/28/2022.\par Presents for wound check and device interrogation\par Denies any sob, DOMINGUEZ, PND, orthopnea, no palpitations, no dizziness,lightheadedness, denies chest pains\par \par # Device interrogation- stable parameter- no event\par \par #Wound check- incision has healed well.\par \par # Remote monitoring- patient is enrolled- understands automatic transmission every 3 months\par \par RTO in 6-9 months\par \par I have also advised the patient to go to the nearest emergency room if he experiences any chest pain, dyspnea, syncope, or has any other compelling symptoms.\par \par \par \par \par \par \par \par \par \par

## 2022-12-12 NOTE — PROCEDURE
[Complete Heart Block] : complete heart block [Pacemaker] : pacemaker [DDDR] : DDDR [Voltage: ___ volts] : Voltage was [unfilled] volts [Longevity: ___ months] : The estimated remaining battery life is [unfilled] months [Threshold Testing Performed] : Threshold testing was performed [Lead Imp:  ___ohms] : lead impedance was [unfilled] ohms [Sensing Amplitude ___mv] : sensing amplitude was [unfilled] mv [___V @] : [unfilled] V [___ ms] : [unfilled] ms [Programmed for Longevity] : output reprogrammed for improved battery longevity [de-identified] : Medtronic Criselda XT DR GAINES [de-identified] : [id number] [de-identified] : [id number] [de-identified] : 9/28/2022 [de-identified] : 60 [de-identified] :  100%\par AP 99%

## 2022-12-12 NOTE — HISTORY OF PRESENT ILLNESS
[Erythema at Site] : no erythema at device site [de-identified] : 76 y/o male with a PPM/ CHB , s/p pacemaker generator changed.\par Presents for wound check and device interrogation\par \par Denies any sob, DOMINGUEZ, PND, orthopnea, no palpitations, no dizziness,lightheadedness, denies chest pains\par \par Cardiologist: Dr. Price\par

## 2022-12-20 ENCOUNTER — NON-APPOINTMENT (OUTPATIENT)
Age: 77
End: 2022-12-20

## 2022-12-20 ENCOUNTER — APPOINTMENT (OUTPATIENT)
Dept: ORTHOPEDIC SURGERY | Facility: CLINIC | Age: 77
End: 2022-12-20
Payer: MEDICARE

## 2022-12-20 VITALS — WEIGHT: 290 LBS | HEIGHT: 68 IN | BODY MASS INDEX: 43.95 KG/M2

## 2022-12-20 PROCEDURE — 20610 DRAIN/INJ JOINT/BURSA W/O US: CPT

## 2022-12-20 PROCEDURE — 73562 X-RAY EXAM OF KNEE 3: CPT | Mod: 50

## 2022-12-20 PROCEDURE — 99214 OFFICE O/P EST MOD 30 MIN: CPT | Mod: 25

## 2022-12-20 NOTE — HISTORY OF PRESENT ILLNESS
[de-identified] :  77-year-old male is here today with his wife for evaluation of his bilateral knees.  Patient states he fell landing on both of his knees a few days ago.  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 Odell in October and had bilateral Synvisc-One injections.  He states since he fell is having worsening pain in both knees.  He is having difficulty walking due to the pain.

## 2022-12-20 NOTE — IMAGING
[de-identified] :   Upon examination of his left knee has moderate swelling, no erythema, no ecchymosis.  He is nontender palpation over the patella, he is tender over the medial joint line.  He is nontender over the lateral joint line.  He is nontender over the patellar quadriceps tendon.  Negative varus and valgus stress test.\par Examination of his right knee has mild swelling, no erythema, no ecchymosis.  Nontender palpation over the patella.  Tender mildly over the medial joint line.  He is nontender over the lateral joint line.  Nontender over the patellar quadriceps tendon.  Negative varus valgus stress test.\par He has mild edema of the calves bilaterally, skin changes of the lower extremities bilaterally consistent with venous insufficiency.  Mild calf tenderness bilaterally.\par \par   X-rays taken the office today of the bilateral knees show tricompartmental degenerative changes bilaterally.  The left knee worse than the right.  No acute fractures, dislocations, or other bony abnormalities noted.

## 2022-12-20 NOTE — DISCUSSION/SUMMARY
[de-identified] :   At this time we discussed different treatment options, he is requesting cortisone injections in both of his knees. Today under sterile technique the knees were cleaned and prepped with alcohol, anesthetized with cold spray and prepped with betadine.  With the patient's consent, I injected 2.5 cc of dexamethasone and 2.5 cc of 1% lidocaine into the lateral joint space of each knee. The patient tolerated this well. The puncture site was cleaned and covered with a Band-Aid.    I sent some tramadol to the pharmacy.  I discussed with the patient and his wife he should follow up with the vascular doctor for his lower extremities.  I discussed with him if he has any worsening swelling or calf pain he should go in immediately for a Doppler.  Will schedule a follow-up for repeat evaluation. Patient will call me if any other problems or concerns.  Patient verbalized understanding and agreed with the plan, all questions were answered in the office today.\par

## 2023-02-27 ENCOUNTER — APPOINTMENT (OUTPATIENT)
Dept: CARDIOLOGY | Facility: CLINIC | Age: 78
End: 2023-02-27

## 2023-02-27 ENCOUNTER — FORM ENCOUNTER (OUTPATIENT)
Age: 78
End: 2023-02-27

## 2023-03-06 ENCOUNTER — APPOINTMENT (OUTPATIENT)
Dept: ORTHOPEDIC SURGERY | Facility: CLINIC | Age: 78
End: 2023-03-06
Payer: MEDICARE

## 2023-03-06 PROCEDURE — 99213 OFFICE O/P EST LOW 20 MIN: CPT

## 2023-03-06 NOTE — HISTORY OF PRESENT ILLNESS
[de-identified] : Patient Complaint - left knee pain has been stable flared up  few months ago  has put on weight still has a quad cane\par mostly for his stroke gel injection was back in October 2020\par History of Present Illness\par 77-year-old gentleman for follow-up of his left knee pain longstanding issues some pain in the right the right had gel\par injection 4 years ago which helped recently been having some pain again does have a cane for multiple medical\par comorbidities he did have bariatric surgery when his weight was up to touch 400 currently 295 he has also had open\par heart surgery and circulatory issues in his legs.  is current medical doctor \par he has hypertension diabetes and hypothyroidism does take baby aspirin did have a gel injection 2  years ago which\par gave him some relief does use a knee sleeve has lost 10 lb is using a cane\par  since last visit the right knee has been giving him a lot more pain as well

## 2023-03-06 NOTE — IMAGING
[de-identified] :  swelling left and right knee limited motion crepitus antalgic gait venous stasis changes in leg some edema no open wounds or ulcers does have a knee sleeve

## 2023-03-06 NOTE — REASON FOR VISIT
[Spouse] : spouse [FreeTextEntry2] : follow up for bilateral knee pain\par  had gel injections 10/14/2022 pain got worse and had cortisone injections 12/20/2022 helped a couple months pain is still bearable weight is not changed 290 lb will be going to [place] does have a brace for the left knee in use a cane occasionally takes tramadol

## 2023-03-06 NOTE — ASSESSMENT
[FreeTextEntry1] :  he does not require any other injections now I would not see a reason to repeat the gel we certainly could try cortisone since it gave him a little better relief we had a very lengthy serious discussion about his weight I will see him in a few months asked that he try to lose a few lb

## 2023-03-29 ENCOUNTER — APPOINTMENT (OUTPATIENT)
Dept: CARDIOLOGY | Facility: CLINIC | Age: 78
End: 2023-03-29

## 2023-03-29 ENCOUNTER — FORM ENCOUNTER (OUTPATIENT)
Age: 78
End: 2023-03-29

## 2023-03-31 ENCOUNTER — NON-APPOINTMENT (OUTPATIENT)
Age: 78
End: 2023-03-31

## 2023-03-31 ENCOUNTER — APPOINTMENT (OUTPATIENT)
Dept: CARDIOLOGY | Facility: CLINIC | Age: 78
End: 2023-03-31
Payer: MEDICARE

## 2023-03-31 PROCEDURE — 93296 REM INTERROG EVL PM/IDS: CPT

## 2023-03-31 PROCEDURE — 93294 REM INTERROG EVL PM/LDLS PM: CPT

## 2023-04-25 ENCOUNTER — APPOINTMENT (OUTPATIENT)
Dept: CARDIOLOGY | Facility: CLINIC | Age: 78
End: 2023-04-25
Payer: MEDICARE

## 2023-04-25 ENCOUNTER — RESULT CHARGE (OUTPATIENT)
Age: 78
End: 2023-04-25

## 2023-04-25 VITALS
BODY MASS INDEX: 41.68 KG/M2 | SYSTOLIC BLOOD PRESSURE: 140 MMHG | DIASTOLIC BLOOD PRESSURE: 90 MMHG | HEIGHT: 68 IN | HEART RATE: 75 BPM | WEIGHT: 275 LBS

## 2023-04-25 PROCEDURE — 93000 ELECTROCARDIOGRAM COMPLETE: CPT

## 2023-04-25 PROCEDURE — 99214 OFFICE O/P EST MOD 30 MIN: CPT | Mod: 25

## 2023-07-06 ENCOUNTER — APPOINTMENT (OUTPATIENT)
Dept: ORTHOPEDIC SURGERY | Facility: CLINIC | Age: 78
End: 2023-07-06
Payer: MEDICARE

## 2023-07-06 PROCEDURE — 99213 OFFICE O/P EST LOW 20 MIN: CPT | Mod: 25

## 2023-07-06 PROCEDURE — 20610 DRAIN/INJ JOINT/BURSA W/O US: CPT | Mod: 50

## 2023-07-06 NOTE — ASSESSMENT
[FreeTextEntry1] :  discussed repeating a cortisone injection in both knees today  the option of a  cortisone injection in both   knees was discussed with the patient today.  risks and benefits were reviewed and  consent was given.  with sterile technique  through a lateral approach 5 cc dexamethasone (20 mg) and 5 cc 1%  lidocaine was infiltrated with good effect and a  Band-Aids were  applied ice was  recommended\par  I will plan to see him in January possibly to repeat them before he goes on an extended trip to [facility] again emphasized the importance to try to get some weight off

## 2023-07-06 NOTE — REASON FOR VISIT
[Spouse] : spouse [FreeTextEntry2] : bilateral knee pain  Pain is getting worse cortisone was more effective than gel still markedly overweight  280 swelling in the legs  using a cane struggles to get up and down a step  tramadol on occasion

## 2023-07-06 NOTE — IMAGING
[de-identified] :  swelling left and right knee limited motion crepitus antalgic gait venous stasis changes in leg some edema no open wounds or ulcers does have a knee sleeve

## 2023-07-06 NOTE — HISTORY OF PRESENT ILLNESS
[de-identified] : Patient Complaint - left knee pain has been stable flared up  few months ago  has put on weight still has a quad cane\par mostly for his stroke gel injection was back in October 2020\par History of Present Illness\par 77-year-old gentleman for follow-up of his left knee pain longstanding issues some pain in the right the right had gel\par injection 4 years ago which helped recently been having some pain again does have a cane for multiple medical\par comorbidities he did have bariatric surgery when his weight was up to touch 400 currently 295 he has also had open\par heart surgery and circulatory issues in his legs.  is current medical doctor \par he has hypertension diabetes and hypothyroidism does take baby aspirin did have a gel injection 2  years ago which\par gave him some relief does use a knee sleeve has lost 10 lb is using a cane\par  since last visit the right knee has been giving him a lot more pain as well

## 2023-07-20 ENCOUNTER — APPOINTMENT (OUTPATIENT)
Dept: ELECTROPHYSIOLOGY | Facility: CLINIC | Age: 78
End: 2023-07-20
Payer: MEDICARE

## 2023-07-20 ENCOUNTER — APPOINTMENT (OUTPATIENT)
Dept: ELECTROPHYSIOLOGY | Facility: CLINIC | Age: 78
End: 2023-07-20

## 2023-07-20 VITALS
HEIGHT: 68 IN | WEIGHT: 299 LBS | SYSTOLIC BLOOD PRESSURE: 142 MMHG | HEART RATE: 74 BPM | DIASTOLIC BLOOD PRESSURE: 82 MMHG | BODY MASS INDEX: 45.31 KG/M2 | TEMPERATURE: 98 F

## 2023-07-20 DIAGNOSIS — R06.02 SHORTNESS OF BREATH: ICD-10-CM

## 2023-07-20 DIAGNOSIS — R07.9 CHEST PAIN, UNSPECIFIED: ICD-10-CM

## 2023-07-20 PROCEDURE — 99214 OFFICE O/P EST MOD 30 MIN: CPT

## 2023-07-20 PROCEDURE — 93280 PM DEVICE PROGR EVAL DUAL: CPT

## 2023-07-20 NOTE — HISTORY OF PRESENT ILLNESS
[Erythema at Site] : no erythema at device site [de-identified] : 77 y/o male with a PMHx of CAD, MI, S/P CABG, S/P PTCA/stent of RCA, HTN, CVA, DM, CHB s/p PPM, s/p pacemaker generator changed 09/28/2022.\par \par Denies any sob, DOMINGUEZ, PND, orthopnea, no palpitations, no dizziness,lightheadedness, denies chest pains\par \par Cardiologist: Dr. Price\par

## 2023-07-20 NOTE — CARDIOLOGY SUMMARY
[de-identified] : 11/28/2022 77 bpm a paced, V paced [de-identified] : 09/28/2022: PPM gen change\par 10/31/2014: PPM implanted

## 2023-07-20 NOTE — REVIEW OF SYSTEMS
[Weight Loss (___ Lbs)] : [unfilled] ~Ulb weight loss [Dyspnea on exertion] : dyspnea during exertion [Negative] : Constitutional

## 2023-07-20 NOTE — PROCEDURE
[Complete Heart Block] : complete heart block [Pacemaker] : pacemaker [DDDR] : DDDR [Voltage: ___ volts] : Voltage was [unfilled] volts [Threshold Testing Performed] : Threshold testing was performed [Lead Imp:  ___ohms] : lead impedance was [unfilled] ohms [Sensing Amplitude ___mv] : sensing amplitude was [unfilled] mv [___V @] : [unfilled] V [___ ms] : [unfilled] ms [Programmed for Longevity] : output reprogrammed for improved battery longevity [See Device Printout] : See device printout [None] : none [de-identified] : Medtronic Criselda XT DR GAINES [de-identified] : [id number] [de-identified] : [id number] [de-identified] : 9/28/2022 [de-identified] : 60/130 [de-identified] : Longevity: 5.5 YEARS [de-identified] :  100%\par %

## 2023-07-20 NOTE — ASSESSMENT
[FreeTextEntry1] : 77 y/o male with a PMHx of CAD, MI, S/P CABG, S/P PTCA/stent of RCA, HTN, CVA, DM, CHB s/p PPM, s/p pacemaker generator changed 09/28/2022.\par \par Today the patient is feeling generally well. He states "the other day I had some chest discomfort and heart racing, took nitro sublingual and felt better". \par \par # Sinus node dysfunction, permanent AV Block s/p DC-PPM\par - Device interrogated and reprogrammed as described in procedure. Device function normal. All parameters stable. No events. See Device Printout\par - Continue remote monitoring. Pt is enrolled.\par \par - F/U with Dr. Price regarding chest discomfort. NOV in November but pt's wife will call to make one sooner.\par - F/U with PCP as needed\par - low sodium/sat fat diet\par - Weight loss/exercise as tolerated encouraged.\par - Pt unsure of current medications. Will need to bring in NOV to reconcile.\par \par I have also advised the patient to go to the nearest emergency room if he experiences any chest pain, dyspnea, syncope, or has any other compelling symptoms.\par \par \par F/U 6-9mo / PRN\par \par \par \par \par \par \par \par

## 2023-07-20 NOTE — PHYSICAL EXAM
[General Appearance - Well Developed] : well developed [Heart Rate And Rhythm] : heart rate and rhythm were normal [Heart Sounds] : normal S1 and S2 [Edema] : no peripheral edema present [] : no respiratory distress [Left Infraclavicular] : left infraclavicular area [Clean] : clean [Dry] : dry [Well-Healed] : well-healed [Abdomen Soft] : soft

## 2023-08-18 ENCOUNTER — NON-APPOINTMENT (OUTPATIENT)
Age: 78
End: 2023-08-18

## 2023-09-29 ENCOUNTER — NON-APPOINTMENT (OUTPATIENT)
Age: 78
End: 2023-09-29

## 2023-10-04 ENCOUNTER — NON-APPOINTMENT (OUTPATIENT)
Age: 78
End: 2023-10-04

## 2023-10-19 ENCOUNTER — NON-APPOINTMENT (OUTPATIENT)
Age: 78
End: 2023-10-19

## 2023-10-19 ENCOUNTER — APPOINTMENT (OUTPATIENT)
Dept: CARDIOLOGY | Facility: CLINIC | Age: 78
End: 2023-10-19
Payer: MEDICARE

## 2023-10-19 PROCEDURE — 93295 DEV INTERROG REMOTE 1/2/MLT: CPT

## 2023-10-19 PROCEDURE — 93296 REM INTERROG EVL PM/IDS: CPT

## 2023-10-24 ENCOUNTER — APPOINTMENT (OUTPATIENT)
Dept: CARDIOLOGY | Facility: CLINIC | Age: 78
End: 2023-10-24
Payer: MEDICARE

## 2023-10-24 VITALS
SYSTOLIC BLOOD PRESSURE: 130 MMHG | HEIGHT: 68 IN | WEIGHT: 290 LBS | HEART RATE: 66 BPM | BODY MASS INDEX: 43.95 KG/M2 | DIASTOLIC BLOOD PRESSURE: 90 MMHG

## 2023-10-24 DIAGNOSIS — I25.10 ATHEROSCLEROTIC HEART DISEASE OF NATIVE CORONARY ARTERY W/OUT ANGINA PECTORIS: ICD-10-CM

## 2023-10-24 DIAGNOSIS — Z95.1 PRESENCE OF AORTOCORONARY BYPASS GRAFT: ICD-10-CM

## 2023-10-24 PROCEDURE — 93000 ELECTROCARDIOGRAM COMPLETE: CPT

## 2023-10-24 PROCEDURE — 99214 OFFICE O/P EST MOD 30 MIN: CPT | Mod: 25

## 2023-12-20 ENCOUNTER — TRANSCRIPTION ENCOUNTER (OUTPATIENT)
Age: 78
End: 2023-12-20

## 2023-12-20 ENCOUNTER — INPATIENT (INPATIENT)
Facility: HOSPITAL | Age: 78
LOS: 0 days | Discharge: LEFT BEFORE DISCHARGE | DRG: 92 | End: 2023-12-21
Attending: INTERNAL MEDICINE | Admitting: INTERNAL MEDICINE
Payer: MEDICARE

## 2023-12-20 VITALS
HEART RATE: 80 BPM | SYSTOLIC BLOOD PRESSURE: 114 MMHG | TEMPERATURE: 98 F | OXYGEN SATURATION: 99 % | DIASTOLIC BLOOD PRESSURE: 53 MMHG | RESPIRATION RATE: 18 BRPM

## 2023-12-20 DIAGNOSIS — Z98.84 BARIATRIC SURGERY STATUS: Chronic | ICD-10-CM

## 2023-12-20 DIAGNOSIS — Z95.1 PRESENCE OF AORTOCORONARY BYPASS GRAFT: Chronic | ICD-10-CM
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
## 2023-12-20 DIAGNOSIS — Z98.890 OTHER SPECIFIED POSTPROCEDURAL STATES: Chronic | ICD-10-CM

## 2023-12-20 DIAGNOSIS — Z95.0 PRESENCE OF CARDIAC PACEMAKER: Chronic | ICD-10-CM

## 2023-12-20 LAB
ALBUMIN SERPL ELPH-MCNC: 3.8 G/DL — SIGNIFICANT CHANGE UP (ref 3.5–5.2)
ALBUMIN SERPL ELPH-MCNC: 3.8 G/DL — SIGNIFICANT CHANGE UP (ref 3.5–5.2)
ALP SERPL-CCNC: 107 U/L — SIGNIFICANT CHANGE UP (ref 30–115)
ALP SERPL-CCNC: 107 U/L — SIGNIFICANT CHANGE UP (ref 30–115)
ALT FLD-CCNC: 10 U/L — SIGNIFICANT CHANGE UP (ref 0–41)
ALT FLD-CCNC: 10 U/L — SIGNIFICANT CHANGE UP (ref 0–41)
ANION GAP SERPL CALC-SCNC: 9 MMOL/L — SIGNIFICANT CHANGE UP (ref 7–14)
ANION GAP SERPL CALC-SCNC: 9 MMOL/L — SIGNIFICANT CHANGE UP (ref 7–14)
APTT BLD: 38.3 SEC — SIGNIFICANT CHANGE UP (ref 27–39.2)
APTT BLD: 38.3 SEC — SIGNIFICANT CHANGE UP (ref 27–39.2)
AST SERPL-CCNC: 34 U/L — SIGNIFICANT CHANGE UP (ref 0–41)
AST SERPL-CCNC: 34 U/L — SIGNIFICANT CHANGE UP (ref 0–41)
BASOPHILS # BLD AUTO: 0.03 K/UL — SIGNIFICANT CHANGE UP (ref 0–0.2)
BASOPHILS # BLD AUTO: 0.03 K/UL — SIGNIFICANT CHANGE UP (ref 0–0.2)
BASOPHILS NFR BLD AUTO: 0.3 % — SIGNIFICANT CHANGE UP (ref 0–1)
BASOPHILS NFR BLD AUTO: 0.3 % — SIGNIFICANT CHANGE UP (ref 0–1)
BILIRUB SERPL-MCNC: 0.9 MG/DL — SIGNIFICANT CHANGE UP (ref 0.2–1.2)
BILIRUB SERPL-MCNC: 0.9 MG/DL — SIGNIFICANT CHANGE UP (ref 0.2–1.2)
BUN SERPL-MCNC: 33 MG/DL — HIGH (ref 10–20)
BUN SERPL-MCNC: 33 MG/DL — HIGH (ref 10–20)
CALCIUM SERPL-MCNC: 8.4 MG/DL — SIGNIFICANT CHANGE UP (ref 8.4–10.5)
CALCIUM SERPL-MCNC: 8.4 MG/DL — SIGNIFICANT CHANGE UP (ref 8.4–10.5)
CHLORIDE SERPL-SCNC: 98 MMOL/L — SIGNIFICANT CHANGE UP (ref 98–110)
CHLORIDE SERPL-SCNC: 98 MMOL/L — SIGNIFICANT CHANGE UP (ref 98–110)
CO2 SERPL-SCNC: 26 MMOL/L — SIGNIFICANT CHANGE UP (ref 17–32)
CO2 SERPL-SCNC: 26 MMOL/L — SIGNIFICANT CHANGE UP (ref 17–32)
CREAT SERPL-MCNC: 1.3 MG/DL — SIGNIFICANT CHANGE UP (ref 0.7–1.5)
CREAT SERPL-MCNC: 1.3 MG/DL — SIGNIFICANT CHANGE UP (ref 0.7–1.5)
EGFR: 56 ML/MIN/1.73M2 — LOW
EGFR: 56 ML/MIN/1.73M2 — LOW
EOSINOPHIL # BLD AUTO: 0.27 K/UL — SIGNIFICANT CHANGE UP (ref 0–0.7)
EOSINOPHIL # BLD AUTO: 0.27 K/UL — SIGNIFICANT CHANGE UP (ref 0–0.7)
EOSINOPHIL NFR BLD AUTO: 3 % — SIGNIFICANT CHANGE UP (ref 0–8)
EOSINOPHIL NFR BLD AUTO: 3 % — SIGNIFICANT CHANGE UP (ref 0–8)
GLUCOSE SERPL-MCNC: 119 MG/DL — HIGH (ref 70–99)
GLUCOSE SERPL-MCNC: 119 MG/DL — HIGH (ref 70–99)
HCT VFR BLD CALC: 43.5 % — SIGNIFICANT CHANGE UP (ref 42–52)
HCT VFR BLD CALC: 43.5 % — SIGNIFICANT CHANGE UP (ref 42–52)
HGB BLD-MCNC: 14.1 G/DL — SIGNIFICANT CHANGE UP (ref 14–18)
HGB BLD-MCNC: 14.1 G/DL — SIGNIFICANT CHANGE UP (ref 14–18)
IMM GRANULOCYTES NFR BLD AUTO: 0.4 % — HIGH (ref 0.1–0.3)
IMM GRANULOCYTES NFR BLD AUTO: 0.4 % — HIGH (ref 0.1–0.3)
INR BLD: 1.01 RATIO — SIGNIFICANT CHANGE UP (ref 0.65–1.3)
INR BLD: 1.01 RATIO — SIGNIFICANT CHANGE UP (ref 0.65–1.3)
LYMPHOCYTES # BLD AUTO: 1.74 K/UL — SIGNIFICANT CHANGE UP (ref 1.2–3.4)
LYMPHOCYTES # BLD AUTO: 1.74 K/UL — SIGNIFICANT CHANGE UP (ref 1.2–3.4)
LYMPHOCYTES # BLD AUTO: 19.5 % — LOW (ref 20.5–51.1)
LYMPHOCYTES # BLD AUTO: 19.5 % — LOW (ref 20.5–51.1)
MCHC RBC-ENTMCNC: 30.7 PG — SIGNIFICANT CHANGE UP (ref 27–31)
MCHC RBC-ENTMCNC: 30.7 PG — SIGNIFICANT CHANGE UP (ref 27–31)
MCHC RBC-ENTMCNC: 32.4 G/DL — SIGNIFICANT CHANGE UP (ref 32–37)
MCHC RBC-ENTMCNC: 32.4 G/DL — SIGNIFICANT CHANGE UP (ref 32–37)
MCV RBC AUTO: 94.6 FL — HIGH (ref 80–94)
MCV RBC AUTO: 94.6 FL — HIGH (ref 80–94)
MONOCYTES # BLD AUTO: 0.72 K/UL — HIGH (ref 0.1–0.6)
MONOCYTES # BLD AUTO: 0.72 K/UL — HIGH (ref 0.1–0.6)
MONOCYTES NFR BLD AUTO: 8.1 % — SIGNIFICANT CHANGE UP (ref 1.7–9.3)
MONOCYTES NFR BLD AUTO: 8.1 % — SIGNIFICANT CHANGE UP (ref 1.7–9.3)
NEUTROPHILS # BLD AUTO: 6.11 K/UL — SIGNIFICANT CHANGE UP (ref 1.4–6.5)
NEUTROPHILS # BLD AUTO: 6.11 K/UL — SIGNIFICANT CHANGE UP (ref 1.4–6.5)
NEUTROPHILS NFR BLD AUTO: 68.7 % — SIGNIFICANT CHANGE UP (ref 42.2–75.2)
NEUTROPHILS NFR BLD AUTO: 68.7 % — SIGNIFICANT CHANGE UP (ref 42.2–75.2)
NRBC # BLD: 0 /100 WBCS — SIGNIFICANT CHANGE UP (ref 0–0)
NRBC # BLD: 0 /100 WBCS — SIGNIFICANT CHANGE UP (ref 0–0)
PLATELET # BLD AUTO: 201 K/UL — SIGNIFICANT CHANGE UP (ref 130–400)
PLATELET # BLD AUTO: 201 K/UL — SIGNIFICANT CHANGE UP (ref 130–400)
PMV BLD: 11.3 FL — HIGH (ref 7.4–10.4)
PMV BLD: 11.3 FL — HIGH (ref 7.4–10.4)
POTASSIUM SERPL-MCNC: 5.9 MMOL/L — HIGH (ref 3.5–5)
POTASSIUM SERPL-MCNC: 5.9 MMOL/L — HIGH (ref 3.5–5)
POTASSIUM SERPL-SCNC: 5.9 MMOL/L — HIGH (ref 3.5–5)
POTASSIUM SERPL-SCNC: 5.9 MMOL/L — HIGH (ref 3.5–5)
PROT SERPL-MCNC: 6.5 G/DL — SIGNIFICANT CHANGE UP (ref 6–8)
PROT SERPL-MCNC: 6.5 G/DL — SIGNIFICANT CHANGE UP (ref 6–8)
PROTHROM AB SERPL-ACNC: 11.5 SEC — SIGNIFICANT CHANGE UP (ref 9.95–12.87)
PROTHROM AB SERPL-ACNC: 11.5 SEC — SIGNIFICANT CHANGE UP (ref 9.95–12.87)
RBC # BLD: 4.6 M/UL — LOW (ref 4.7–6.1)
RBC # BLD: 4.6 M/UL — LOW (ref 4.7–6.1)
RBC # FLD: 12.5 % — SIGNIFICANT CHANGE UP (ref 11.5–14.5)
RBC # FLD: 12.5 % — SIGNIFICANT CHANGE UP (ref 11.5–14.5)
SODIUM SERPL-SCNC: 133 MMOL/L — LOW (ref 135–146)
SODIUM SERPL-SCNC: 133 MMOL/L — LOW (ref 135–146)
WBC # BLD: 8.91 K/UL — SIGNIFICANT CHANGE UP (ref 4.8–10.8)
WBC # BLD: 8.91 K/UL — SIGNIFICANT CHANGE UP (ref 4.8–10.8)
WBC # FLD AUTO: 8.91 K/UL — SIGNIFICANT CHANGE UP (ref 4.8–10.8)
WBC # FLD AUTO: 8.91 K/UL — SIGNIFICANT CHANGE UP (ref 4.8–10.8)

## 2023-12-20 PROCEDURE — 73630 X-RAY EXAM OF FOOT: CPT | Mod: 26,LT

## 2023-12-20 PROCEDURE — 93970 EXTREMITY STUDY: CPT | Mod: 26

## 2023-12-20 PROCEDURE — 83036 HEMOGLOBIN GLYCOSYLATED A1C: CPT

## 2023-12-20 PROCEDURE — 70450 CT HEAD/BRAIN W/O DYE: CPT | Mod: 26,MA

## 2023-12-20 PROCEDURE — 99285 EMERGENCY DEPT VISIT HI MDM: CPT

## 2023-12-20 PROCEDURE — 73610 X-RAY EXAM OF ANKLE: CPT | Mod: 26,LT

## 2023-12-20 PROCEDURE — 36415 COLL VENOUS BLD VENIPUNCTURE: CPT

## 2023-12-20 PROCEDURE — 99222 1ST HOSP IP/OBS MODERATE 55: CPT

## 2023-12-20 PROCEDURE — 73700 CT LOWER EXTREMITY W/O DYE: CPT | Mod: 26,LT,MA,76

## 2023-12-20 RX ORDER — ASPIRIN/CALCIUM CARB/MAGNESIUM 324 MG
81 TABLET ORAL DAILY
Refills: 0 | Status: DISCONTINUED | OUTPATIENT
Start: 2023-12-20 | End: 2023-12-21

## 2023-12-20 RX ORDER — LISINOPRIL 2.5 MG/1
10 TABLET ORAL DAILY
Refills: 0 | Status: DISCONTINUED | OUTPATIENT
Start: 2023-12-20 | End: 2023-12-21

## 2023-12-20 RX ORDER — PANTOPRAZOLE SODIUM 20 MG/1
40 TABLET, DELAYED RELEASE ORAL
Refills: 0 | Status: DISCONTINUED | OUTPATIENT
Start: 2023-12-20 | End: 2023-12-21

## 2023-12-20 RX ORDER — DEXTROSE 50 % IN WATER 50 %
15 SYRINGE (ML) INTRAVENOUS ONCE
Refills: 0 | Status: DISCONTINUED | OUTPATIENT
Start: 2023-12-20 | End: 2023-12-21

## 2023-12-20 RX ORDER — INSULIN LISPRO 100/ML
VIAL (ML) SUBCUTANEOUS
Refills: 0 | Status: DISCONTINUED | OUTPATIENT
Start: 2023-12-20 | End: 2023-12-20

## 2023-12-20 RX ORDER — GABAPENTIN 400 MG/1
300 CAPSULE ORAL DAILY
Refills: 0 | Status: DISCONTINUED | OUTPATIENT
Start: 2023-12-20 | End: 2023-12-21

## 2023-12-20 RX ORDER — DEXTROSE 50 % IN WATER 50 %
25 SYRINGE (ML) INTRAVENOUS ONCE
Refills: 0 | Status: DISCONTINUED | OUTPATIENT
Start: 2023-12-20 | End: 2023-12-21

## 2023-12-20 RX ORDER — DEXTROSE 50 % IN WATER 50 %
12.5 SYRINGE (ML) INTRAVENOUS ONCE
Refills: 0 | Status: DISCONTINUED | OUTPATIENT
Start: 2023-12-20 | End: 2023-12-21

## 2023-12-20 RX ORDER — SODIUM CHLORIDE 9 MG/ML
1000 INJECTION, SOLUTION INTRAVENOUS
Refills: 0 | Status: DISCONTINUED | OUTPATIENT
Start: 2023-12-20 | End: 2023-12-21

## 2023-12-20 RX ORDER — TAMSULOSIN HYDROCHLORIDE 0.4 MG/1
0.4 CAPSULE ORAL AT BEDTIME
Refills: 0 | Status: DISCONTINUED | OUTPATIENT
Start: 2023-12-20 | End: 2023-12-21

## 2023-12-20 RX ORDER — GABAPENTIN 400 MG/1
600 CAPSULE ORAL AT BEDTIME
Refills: 0 | Status: DISCONTINUED | OUTPATIENT
Start: 2023-12-20 | End: 2023-12-21

## 2023-12-20 RX ORDER — SIMVASTATIN 20 MG/1
20 TABLET, FILM COATED ORAL AT BEDTIME
Refills: 0 | Status: DISCONTINUED | OUTPATIENT
Start: 2023-12-20 | End: 2023-12-21

## 2023-12-20 RX ORDER — CITALOPRAM 10 MG/1
40 TABLET, FILM COATED ORAL DAILY
Refills: 0 | Status: DISCONTINUED | OUTPATIENT
Start: 2023-12-20 | End: 2023-12-21

## 2023-12-20 RX ORDER — GLUCAGON INJECTION, SOLUTION 0.5 MG/.1ML
1 INJECTION, SOLUTION SUBCUTANEOUS ONCE
Refills: 0 | Status: DISCONTINUED | OUTPATIENT
Start: 2023-12-20 | End: 2023-12-21

## 2023-12-20 RX ORDER — HEPARIN SODIUM 5000 [USP'U]/ML
5000 INJECTION INTRAVENOUS; SUBCUTANEOUS EVERY 8 HOURS
Refills: 0 | Status: DISCONTINUED | OUTPATIENT
Start: 2023-12-20 | End: 2023-12-21

## 2023-12-20 NOTE — DISCHARGE NOTE PROVIDER - CARE PROVIDER_API CALL
Shan Saavedra .  Internal Medicine  [address] [place] [address]  Phone: [phone]  Fax: [phone]  Follow Up Time: 1 week   Shan Saavedra .  Internal Medicine  [address] [place] [address]  Phone: [phone]  Fax: [phone]  Follow Up Time: 1 week

## 2023-12-20 NOTE — DISCHARGE NOTE NURSING/CASE MANAGEMENT/SOCIAL WORK - NSDCPEFALRISK_GEN_ALL_CORE
For information on Fall & Injury Prevention, visit: [email or web address] OR  [email or web address] OR  https://www.cdc.gov/steadi/patient.html For information on Fall & Injury Prevention, visit: [email or web address] OR  [email or web address] OR  https://www.cdc.gov/steadi/patient.html

## 2023-12-20 NOTE — H&P ADULT - HISTORY OF PRESENT ILLNESS
79 yo m with pmh of HTN, DLP, BPH, CAD, PVD on ASA, presents with c/o LLE pain and inability to walk.  pt had a mechanical trip and fall 9 days ago.  pt had hit the top of his head and possibly rolled his foot/ankle.  pt uses a walker at baseline. Noted by wife to have increasing difficulty walking the last few days, and today unable to get out of bed due to L foot/ankle/leg pain.    He denies any chest pain, SOB, dizziness, diarrhea, urinary sx    In the ED: BP:  114/53, HR: 80, RR: 18, Temp: 98.2, SpO2: 99 on Ra    Labs:  -> WBC: 8.9, H.1, MCV: 94, plt: 201  -> Na: 133, K: 5.9  hemolyzed, crea 1,3 ( was 1 in 2022)    CT ankle/Foot:  1. Apparent nondisplaced fractures in the first metatarsal head and base of the first distal phalanx, correlate with point tenderness.  2. Diffuse osteopenia without additional acute osseous abnormality.  3. Subcutaneous hemorrhage along the dorsal forefoot.    VA duplex venous: No DVT    CT Head:  No acute intracranial pathology. No evidence of midline shift, mass effect or intracranial hemorrhage.  Chronic bilateral cerebellar infarcts.    Admitted to medicine for further evaluation   77 yo m with pmh of HTN, DLP, BPH, CAD, PVD on ASA, presents with c/o LLE pain and inability to walk.  pt had a mechanical trip and fall 9 days ago.  pt had hit the top of his head and possibly rolled his foot/ankle.  pt uses a walker at baseline. Noted by wife to have increasing difficulty walking the last few days, and today unable to get out of bed due to L foot/ankle/leg pain.    He denies any chest pain, SOB, dizziness, diarrhea, urinary sx    In the ED: BP:  114/53, HR: 80, RR: 18, Temp: 98.2, SpO2: 99 on Ra    Labs:  -> WBC: 8.9, H.1, MCV: 94, plt: 201  -> Na: 133, K: 5.9  hemolyzed, crea 1,3 ( was 1 in 2022)    CT ankle/Foot:  1. Apparent nondisplaced fractures in the first metatarsal head and base of the first distal phalanx, correlate with point tenderness.  2. Diffuse osteopenia without additional acute osseous abnormality.  3. Subcutaneous hemorrhage along the dorsal forefoot.    VA duplex venous: No DVT    CT Head:  No acute intracranial pathology. No evidence of midline shift, mass effect or intracranial hemorrhage.  Chronic bilateral cerebellar infarcts.    Admitted to medicine for further evaluation   77 yo m with pmh of HTN, DLP, BPH, CAD, PVD on ASA, presents with c/o LLE pain and inability to walk.  pt had a mechanical trip and fall 9 days ago.  pt had hit the top of his head and possibly rolled his foot/ankle.  pt uses a walker at baseline. Noted by wife to have increasing difficulty walking the last few days, fell twice during last month while he was walking in the dark and today unable to get out of bed due to L foot/ankle/leg pain.    He denies any chest pain, SOB, dizziness, diarrhea, urinary sx    In the ED: BP:  114/53, HR: 80, RR: 18, Temp: 98.2, SpO2: 99 on Ra    Labs:  -> WBC: 8.9, H.1, MCV: 94, plt: 201  -> Na: 133, K: 5.9  hemolyzed, crea 1,3 ( was 1 in 2022)    CT ankle/Foot:  1. Apparent nondisplaced fractures in the first metatarsal head and base of the first distal phalanx, correlate with point tenderness.  2. Diffuse osteopenia without additional acute osseous abnormality.  3. Subcutaneous hemorrhage along the dorsal forefoot.    VA duplex venous: No DVT    CT Head:  No acute intracranial pathology. No evidence of midline shift, mass effect or intracranial hemorrhage.  Chronic bilateral cerebellar infarcts.    Admitted to medicine for further evaluation

## 2023-12-20 NOTE — H&P ADULT - NSHPREVIEWOFSYSTEMS_GEN_ALL_CORE

## 2023-12-20 NOTE — ED PROVIDER NOTE - CLINICAL SUMMARY MEDICAL DECISION MAKING FREE TEXT BOX
pt with fall 9 days ago, now with worsening LLE pain and swelling, fractured toe.  admission as pt is unable to walk.  Any ordered labs and EKG were reviewed.  Any imaging was ordered and reviewed by me.  Appropriate medications for patient's presenting complaints were ordered and effects were reassessed.  Patient's records (prior hospital, ED visit, and/or nursing home notes if available) were reviewed.  Additional history was obtained from EMS, family, and/or PCP (where available).  Escalation to admission/observation was considered. Patient requires inpatient hospitalization - monitored setting.

## 2023-12-20 NOTE — ED ADULT NURSE NOTE - NSFALLHARMRISKINTERV_ED_ALL_ED
Assistance OOB with selected safe patient handling equipment if applicable/Assistance with ambulation/Communicate risk of Fall with Harm to all staff, patient, and family/Monitor gait and stability/Provide visual cue: red socks, yellow wristband, yellow gown, etc/Reinforce activity limits and safety measures with patient and family/Bed in lowest position, wheels locked, appropriate side rails in place/Call bell, personal items and telephone in reach/Instruct patient to call for assistance before getting out of bed/chair/stretcher/Non-slip footwear applied when patient is off stretcher/[place] to call system/Physically safe environment - no spills, clutter or unnecessary equipment/Purposeful Proactive Rounding/Room/bathroom lighting operational, light cord in reach Assistance OOB with selected safe patient handling equipment if applicable/Assistance with ambulation/Communicate risk of Fall with Harm to all staff, patient, and family/Monitor gait and stability/Provide visual cue: red socks, yellow wristband, yellow gown, etc/Reinforce activity limits and safety measures with patient and family/Bed in lowest position, wheels locked, appropriate side rails in place/Call bell, personal items and telephone in reach/Instruct patient to call for assistance before getting out of bed/chair/stretcher/Non-slip footwear applied when patient is off stretcher/[place] to call system/Physically safe environment - no spills, clutter or unnecessary equipment/Purposeful Proactive Rounding/Room/bathroom lighting operational, light cord in reach

## 2023-12-20 NOTE — ED PROVIDER NOTE - OBJECTIVE STATEMENT
pt with pmhx HTN, HLD, BPH, CVA, PPM, CAD on ASA presents to ED with c/o LLE pain and inability to walk s/p mechanical fall 9 days ago. pt had hit the front of his head and inverted his L foot/ankle. uses a walker at baseline, but now barely able to bear weight or ambulate without more assistance. pain is sharp, nonradiating, moderate. denies exacerbating or relieving factors. Denies fever/chill/HA/dizziness/chest pain/palpitation/sob/abd pain/n/v/d/ black stool/bloody stool/urinary sxs

## 2023-12-20 NOTE — DISCHARGE NOTE NURSING/CASE MANAGEMENT/SOCIAL WORK - PATIENT PORTAL LINK FT
You can access the FollowMyHealth Patient Portal offered by [facility] by registering at the following website: [email or web address]. By joining [employer]’s FollowMyHealth portal, you will also be able to view your health information using other applications (apps) compatible with our system. You can access the FollowMyHealth Patient Portal offered by [facility] by registering at the following website: [email or web address]. By joining [employer]’s FollowMyHealth portal, you will also be able to view your health information using other applications (apps) compatible with our system.

## 2023-12-20 NOTE — ED PROVIDER NOTE - CARE PLAN
Principal Discharge DX:	Fall  Secondary Diagnosis:	Toe fracture, left  Secondary Diagnosis:	Hematoma   1

## 2023-12-20 NOTE — ED PROVIDER NOTE - PHYSICAL EXAMINATION
CONSTITUTIONAL: Well-appearing; well-nourished; in no apparent distress.   NECK: Supple; non-tender; no cervical lymphadenopathy.   CARDIOVASCULAR: Normal S1, S2; no murmurs, rubs, or gallops.   RESPIRATORY: Normal chest excursion with respiration; breath sounds clear and equal bilaterally; no wheezes, rhonchi, or rales.  GI/: Normal bowel sounds; non-distended; non-tender; no palpable organomegaly.   MS: see below, otherwise No CVA tenderness. Normal ROM in all other extremities; non-tender to palpation; distal pulses are normal.   SKIN: L foot with swelling and erythema at the dorsum, multiple toes with ecchymosis and 2nd toe with an open wound, LLE swelling and tightness and tenderness  NEURO/PSYCH: A & O x 4; grossly unremarkable. mood and manner are appropriate.

## 2023-12-20 NOTE — DISCHARGE NOTE PROVIDER - NSDCCPCAREPLAN_GEN_ALL_CORE_FT
PRINCIPAL DISCHARGE DIAGNOSIS  Diagnosis: Fall  Assessment and Plan of Treatment: You presented to the hospital complaining of multiple mechanical falls but signed out against medical advice before a complete evaluation was completed. Please follow up with your PCP in 1 week. Please do not hesitate to return to the ER if your symptoms worsen.      SECONDARY DISCHARGE DIAGNOSES  Diagnosis: Toe fracture, left  Assessment and Plan of Treatment:     Diagnosis: Hematoma  Assessment and Plan of Treatment:

## 2023-12-20 NOTE — H&P ADULT - NSHPPHYSICALEXAM_GEN_ALL_CORE
T(C): 36.8 (12-20-23 @ 09:43), Max: 36.8 (12-20-23 @ 09:43)  HR: 80 (12-20-23 @ 09:43) (80 - 80)  BP: 114/53 (12-20-23 @ 09:43) (114/53 - 114/53)  RR: 18 (12-20-23 @ 09:43) (18 - 18)  SpO2: 99% (12-20-23 @ 09:43) (99% - 99%)    CONSTITUTIONAL: Well groomed, no apparent distress  EYES: PERRLA and symmetric, EOMI, No conjunctival or scleral injection, non-icteric  ENMT: Oral mucosa with moist membranes.  RESP: No respiratory distress, no use of accessory muscles; CTA b/l, no WRR  CV: RRR, +S1S2, no MRG; no JVD;  b/l pitting edema  GI: Soft, NT, ND, no rebound, no guarding; no palpable masses; no hepatosplenomegaly; no hernia palpated  MSK: L foot with swelling and erythema at the dorsum, multiple toes with ecchymosis and 2nd toe with an open wound, LLE swelling and tightness and tenderness  SKI: Multiple bruises with different stages of healing, Bilat LE stasis dermatitis

## 2023-12-20 NOTE — ED PROVIDER NOTE - ATTENDING APP SHARED VISIT CONTRIBUTION OF CARE
77 yo m with pmh of htn, hld, bph, cad, pvd, on asa, presents with c/o LLE pain and inability to walk.  pt had a mechanical trip and fall 9 days ago.  pt had hit the top of his head and possibly rolled his foot/ankle.  pt uses a walker at baseline.  noted by wife to have increasing difficulty walking the last few days, and today unable to get out of bed due to L foot/ankle/leg pain.  pt denies cp or sob.  no neuro sx.  exam: nad, ncat, perrl, eomi, mmm, rrr, ctab, abd soft, nt, nd aox3, b/l pitting edema, venous stasis skin changes, L foot with swelling and erythema at the dorsum, multiple toes with ecchymosis and 2nd toe with an open wound, LLE swelling and tightness and tenderness imp: pt with fall 9 days ago, now with worsening LLE pain and swelling, will check labs and imaging.  hong admission as pt is unable to walk

## 2023-12-20 NOTE — H&P ADULT - ASSESSMENT
77 yo m with pmh of HTN, DLP, BPH, CAD, PVD on ASA, presents with c/o LLE pain and inability to walk.  pt had a mechanical trip and fall 9 days ago.  pt had hit the top of his head and possibly rolled his foot/ankle.  pt uses a walker at baseline. Noted by wife to have increasing difficulty walking the last few days, and today unable to get out of bed due to L foot/ankle/leg pain.     S/P CABG    S/P PTCA/stent of RCA    S/P PPM    DM    Hyperlipidemia    Obesity. 79 yo m with pmh of HTN, DLP, BPH, CAD, PVD on ASA, presents with c/o LLE pain and inability to walk.  pt had a mechanical trip and fall 9 days ago.  pt had hit the top of his head and possibly rolled his foot/ankle.  pt uses a walker at baseline. Noted by wife to have increasing difficulty walking the last few days, and today unable to get out of bed due to L foot/ankle/leg pain.     #Falls most probably mechanical  - CTH chronic cerebellar infarct, possibly causing instability?  - f/u orthostatic  - Consider PT eval when weight bearing status assessed   - f/u EP for PPM interrogation  - f/u echo     #Metatarsal fracture:  - CT: Apparent nondisplaced fractures in the first metatarsal head and base of the first distal phalanx,  - f/u Podiatry cs    #BOBBY  - Baseline crea 1  - Home Lasix on Hold   - Trend crea  - f/u CPK    #HTN  #s/p CABG  #s/p PTCA/stent of RCA  #s/p PPM  - c/w Home ASA 81 QD  - HOLDING home Lasix 40 QD   - c/w home Lisinopril 10 QD    #DM  - f/u A1c  - SS     #Neuropathy:  - c/w Home Citalopram 40 QD  - c/w Home gabapentin 300 AM, 600 PM    #Misc:  - DVT ppx: Pantoprazole 40 QD  - GI PPX: Pantoprazole 40 QD  - Diet: DASH  - Activity/PT eval: AAT  - Code status: Full code  - Dispo: Mes/surg 77 yo m with pmh of HTN, DLP, BPH, CAD, PVD on ASA, presents with c/o LLE pain and inability to walk.  pt had a mechanical trip and fall 9 days ago.  pt had hit the top of his head and possibly rolled his foot/ankle.  pt uses a walker at baseline. Noted by wife to have increasing difficulty walking the last few days, and today unable to get out of bed due to L foot/ankle/leg pain.     #Falls most probably mechanical  - CTH chronic cerebellar infarct, possibly causing instability?  - f/u orthostatic  - Consider PT eval when weight bearing status assessed   - f/u EP for PPM interrogation  - f/u echo     #Metatarsal fracture:  - CT: Apparent nondisplaced fractures in the first metatarsal head and base of the first distal phalanx,  - f/u Podiatry cs    #BOBBY  - Baseline crea 1  - Home Lasix on Hold   - Trend crea  - f/u CPK    #HTN  #s/p CABG  #s/p PTCA/stent of RCA  #s/p PPM  - c/w Home ASA 81 QD  - HOLDING home Lasix 40 QD   - c/w home Lisinopril 10 QD    #DM  - f/u A1c  - SS     #Neuropathy:  - c/w Home Citalopram 40 QD  - c/w Home gabapentin 300 AM, 600 PM    #Misc:  - DVT ppx: Pantoprazole 40 QD  - GI PPX: Pantoprazole 40 QD  - Diet: DASH  - Activity/PT eval: AAT  - Code status: Full code  - Dispo: Mes/surg

## 2023-12-20 NOTE — ED ADULT TRIAGE NOTE - CHIEF COMPLAINT QUOTE
Pt presents to the ED w/ c/o left foot pain s/p mechanical trip and fall on the 12/11.  Pt states pain in the left leg has progressively gotten worse. Denies AC usePMHx. CVA cardiac pacemaker.

## 2023-12-20 NOTE — DISCHARGE NOTE NURSING/CASE MANAGEMENT/SOCIAL WORK - NSDCVIVACCINE_GEN_ALL_CORE_FT
Tdap; 06-Sep-2021 18:44; Darshana Diaz (RN); [employer]; [id number] (Exp. Date: 21-Jan-2023); IntraMuscular; Deltoid Left.; 0.5 milliLiter(s); VIS (VIS Published: 09-May-2013, VIS Presented: 06-Sep-2021);

## 2023-12-20 NOTE — H&P ADULT - ATTENDING COMMENTS
78 year old male with PMH of HTN, DLP, BPH, CAD, PVD, presents with c/o LLE pain and inability to walk.  pt had a mechanical trip and fall 9 days ago.  pt had hit the top of his head and possibly rolled his foot/ankle.  pt uses a walker at baseline. Noted by wife to have increasing difficulty walking the last few days, fell twice during last month while he was walking in the dark and today unable to get out of bed due to L foot/ankle/leg pain.    He denies any chest pain, SOB, dizziness, diarrhea, urinary sx, in the ED vital signs were stable, CT foot showed left 1st toe metatarsal nondisplaced fracture.     PHYSICAL EXAM:  GENERAL: NAD, well-developed.  HEAD:  Atraumatic, Normocephalic.  EYES: EOMI, PERRLA, conjunctiva and sclera clear.  NECK: Supple, No JVD.  CHEST/LUNG: Clear to auscultation bilaterally; No wheeze.  HEART: Regular rate and rhythm; S1 S2.   ABDOMEN: Soft, Nontender, Nondistended; Bowel sounds present.  EXTREMITIES:  left foot edema, mild erythema, left 1st toe mild tenderness, chronic venous stasis changes,   PSYCH: AAOx3.  NEUROLOGY: non-focal.  SKIN: No rashes or lesions.    A/P:   Left 1st toe metatarsal Fracture:   CT foot showed  nondisplaced fractures in the first metatarsal head and base of the first distal phalanx, Subcutaneous hemorrhage along the dorsal forefoot.  Podiatry consult.   Pain control,     Frequent fall;   Per patient he tripped, and after that he was unable to walk.   CT Head showed Chronic bilateral cerebellar infarcts.  Fall precaution, PT consult.     CAD s/p CABG and PCI.   EKG  No chest pain  Continue ASA, and Statin.    HTN: Continue Lasix and Lisinopril.   DM type

## 2023-12-20 NOTE — CONSULT NOTE ADULT - SUBJECTIVE AND OBJECTIVE BOX
Podiatry Consult Note    Subjective:  YARA WILLIAMSON  Seen Bedside 78y Male  .   Patient is a 78y old  Male who presents with a chief complaint of   HPI:  77 yo m with pmh of HTN, DLP, BPH, CAD, PVD on ASA, presents with c/o LLE pain and inability to walk.  pt had a mechanical trip and fall 9 days ago.  pt had hit the top of his head and possibly rolled his foot/ankle.  pt uses a walker at baseline. Noted by wife to have increasing difficulty walking the last few days, fell twice during last month while he was walking in the dark and today unable to get out of bed due to L foot/ankle/leg pain.    He denies any chest pain, SOB, dizziness, diarrhea, urinary sx    In the ED: BP:  114/53, HR: 80, RR: 18, Temp: 98.2, SpO2: 99 on Ra    Labs:  -> WBC: 8.9, H.1, MCV: 94, plt: 201  -> Na: 133, K: 5.9  hemolyzed, crea 1,3 ( was 1 in 2022)    CT ankle/Foot:  1. Apparent nondisplaced fractures in the first metatarsal head and base of the first distal phalanx, correlate with point tenderness.  2. Diffuse osteopenia without additional acute osseous abnormality.  3. Subcutaneous hemorrhage along the dorsal forefoot.    VA duplex venous: No DVT    CT Head:  No acute intracranial pathology. No evidence of midline shift, mass effect or intracranial hemorrhage.  Chronic bilateral cerebellar infarcts.    Admitted to medicine for further evaluation   (20 Dec 2023 15:29)      Past Medical History and Surgical History  PAST MEDICAL & SURGICAL HISTORY:  Pacemaker      Obesity      HTN (hypertension)      BPH (benign prostatic hyperplasia)      DVT (deep venous thrombosis)      Hypercholesterolemia      CAD (coronary artery disease)      CVA (cerebral vascular accident)      Pacemaker  2010      S/P gastric bypass        S/P primary angioplasty      S/P CABG (coronary artery bypass graft)           Review of Systems:  [X] Ten point review of systems is otherwise negative except as noted     Objective:  Vital Signs Last 24 Hrs  T(C): 36.8 (20 Dec 2023 09:43), Max: 36.8 (20 Dec 2023 09:43)  T(F): 98.2 (20 Dec 2023 09:43), Max: 98.2 (20 Dec 2023 09:43)  HR: 80 (20 Dec 2023 09:43) (80 - 80)  BP: 114/53 (20 Dec 2023 09:43) (114/53 - 114/53)  BP(mean): --  RR: 18 (20 Dec 2023 09:43) (18 - 18)  SpO2: 99% (20 Dec 2023 09:43) (99% - 99%)    Parameters below as of 20 Dec 2023 09:43  Patient On (Oxygen Delivery Method): room air                            14.1   8.91  )-----------( 201      ( 20 Dec 2023 12:04 )             43.5                 12-20    133<L>  |  98  |  33<H>  ----------------------------<  119<H>  5.9<H>   |  26  |  1.3    Ca    8.4      20 Dec 2023 12:04    TPro  6.5  /  Alb  3.8  /  TBili  0.9  /  DBili  x   /  AST  34  /  ALT  10  /  AlkPhos  107  12-20        Physical Exam - Lower Extremity Focused: Left foot  Derm: Left foot dorsum shows increased erythema with shiny appearance to skin. There is mild ecchymosis noted with vascular skin changes noted to digits 1,2,3 and 4. There is a small superficial abrasion to the 2nd digit but no open wounds.   Vascular: DP and PT Pulses Diminished; Foot is Warm to Warm to the touch; Capillary Refill Time < 3 Seconds;    Neuro: Protective Sensation Diminished / Moderately Neuropathic   MSK: Pain On Palpation at Wound Site     Assessment:  Left foot non displaced first metatarsal fracture    Plan:  Chart reviewed and Patient evaluated. All Questions and Concerns Addressed and Answered  CT left foot; 1. Apparent nondisplaced fractures in the first metatarsal head and base of the first distal phalanx, correlate with point tenderness.  2. Diffuse osteopenia without additional acute osseous abnormality.  3. Subcutaneous hemorrhage along the dorsal forefoot.  Weight Bearing Status; WBAT w/ Heel Touch w/ Surgical Shoe  Patient was seen and assessed in the ED  Patient was told of the CT findings and understood the education given to him.   Initial plan is for conservative management of this fracture with weight bearing as tolerated in surgical shoe  Possible need for vascular   Symptoms of swelling can be treated with compression and ice with elevation  Pain can be managed with tramadol or any OTC medication patient can tolerate  Advised patient to follow up as outpatient with Dr Farmer as soon as possible and patient stated that they will come next week after [place] as they are too busy  Please visit Topher Condon for follow up  Discussed Plan w/ Dr Farmer    Podiatry  Podiatry Consult Note    Subjective:  YARA WILLIAMSON  Seen Bedside 78y Male  .   Patient is a 78y old  Male who presents with a chief complaint of   HPI:  79 yo m with pmh of HTN, DLP, BPH, CAD, PVD on ASA, presents with c/o LLE pain and inability to walk.  pt had a mechanical trip and fall 9 days ago.  pt had hit the top of his head and possibly rolled his foot/ankle.  pt uses a walker at baseline. Noted by wife to have increasing difficulty walking the last few days, fell twice during last month while he was walking in the dark and today unable to get out of bed due to L foot/ankle/leg pain.    He denies any chest pain, SOB, dizziness, diarrhea, urinary sx    In the ED: BP:  114/53, HR: 80, RR: 18, Temp: 98.2, SpO2: 99 on Ra    Labs:  -> WBC: 8.9, H.1, MCV: 94, plt: 201  -> Na: 133, K: 5.9  hemolyzed, crea 1,3 ( was 1 in 2022)    CT ankle/Foot:  1. Apparent nondisplaced fractures in the first metatarsal head and base of the first distal phalanx, correlate with point tenderness.  2. Diffuse osteopenia without additional acute osseous abnormality.  3. Subcutaneous hemorrhage along the dorsal forefoot.    VA duplex venous: No DVT    CT Head:  No acute intracranial pathology. No evidence of midline shift, mass effect or intracranial hemorrhage.  Chronic bilateral cerebellar infarcts.    Admitted to medicine for further evaluation   (20 Dec 2023 15:29)      Past Medical History and Surgical History  PAST MEDICAL & SURGICAL HISTORY:  Pacemaker      Obesity      HTN (hypertension)      BPH (benign prostatic hyperplasia)      DVT (deep venous thrombosis)      Hypercholesterolemia      CAD (coronary artery disease)      CVA (cerebral vascular accident)      Pacemaker  2010      S/P gastric bypass        S/P primary angioplasty      S/P CABG (coronary artery bypass graft)           Review of Systems:  [X] Ten point review of systems is otherwise negative except as noted     Objective:  Vital Signs Last 24 Hrs  T(C): 36.8 (20 Dec 2023 09:43), Max: 36.8 (20 Dec 2023 09:43)  T(F): 98.2 (20 Dec 2023 09:43), Max: 98.2 (20 Dec 2023 09:43)  HR: 80 (20 Dec 2023 09:43) (80 - 80)  BP: 114/53 (20 Dec 2023 09:43) (114/53 - 114/53)  BP(mean): --  RR: 18 (20 Dec 2023 09:43) (18 - 18)  SpO2: 99% (20 Dec 2023 09:43) (99% - 99%)    Parameters below as of 20 Dec 2023 09:43  Patient On (Oxygen Delivery Method): room air                            14.1   8.91  )-----------( 201      ( 20 Dec 2023 12:04 )             43.5                 12-20    133<L>  |  98  |  33<H>  ----------------------------<  119<H>  5.9<H>   |  26  |  1.3    Ca    8.4      20 Dec 2023 12:04    TPro  6.5  /  Alb  3.8  /  TBili  0.9  /  DBili  x   /  AST  34  /  ALT  10  /  AlkPhos  107  12-20        Physical Exam - Lower Extremity Focused: Left foot  Derm: Left foot dorsum shows increased erythema with shiny appearance to skin. There is mild ecchymosis noted with vascular skin changes noted to digits 1,2,3 and 4. There is a small superficial abrasion to the 2nd digit but no open wounds.   Vascular: DP and PT Pulses Diminished; Foot is Warm to Warm to the touch; Capillary Refill Time < 3 Seconds;    Neuro: Protective Sensation Diminished / Moderately Neuropathic   MSK: Pain On Palpation at Wound Site     Assessment:  Left foot non displaced first metatarsal fracture    Plan:  Chart reviewed and Patient evaluated. All Questions and Concerns Addressed and Answered  CT left foot; 1. Apparent nondisplaced fractures in the first metatarsal head and base of the first distal phalanx, correlate with point tenderness.  2. Diffuse osteopenia without additional acute osseous abnormality.  3. Subcutaneous hemorrhage along the dorsal forefoot.  Weight Bearing Status; WBAT w/ Heel Touch w/ Surgical Shoe  Patient was seen and assessed in the ED  Patient was told of the CT findings and understood the education given to him.   Initial plan is for conservative management of this fracture with weight bearing as tolerated in surgical shoe  Possible need for vascular   Symptoms of swelling can be treated with compression and ice with elevation  Pain can be managed with tramadol or any OTC medication patient can tolerate  Advised patient to follow up as outpatient with Dr Farmer as soon as possible and patient stated that they will come next week after [place] as they are too busy  Please visit Topher Condon for follow up  Discussed Plan w/ Dr Farmer    Podiatry

## 2023-12-20 NOTE — DISCHARGE NOTE PROVIDER - CARE PROVIDERS DIRECT ADDRESSES
[email or web address]

## 2023-12-20 NOTE — DISCHARGE NOTE PROVIDER - PROVIDER TOKENS
PROVIDER:[TOKEN:[[id number]],FOLLOWUP:[1 week]] PROVIDER:[TOKEN:[[id number]],FOLLOWUP:[1 week]]

## 2023-12-20 NOTE — DISCHARGE NOTE PROVIDER - NSDCMRMEDTOKEN_GEN_ALL_CORE_FT
Aspir 81 oral delayed release tablet: 1 tab(s) orally once a day  citalopram 40 mg oral tablet: 1 tab(s) orally once a day  furosemide 40 mg oral tablet: 1 tab(s) orally once a day  gabapentin 300 mg oral capsule: 1 cap(s) orally once a day  gabapentin 300 mg oral capsule: 2 cap(s) orally once a day (at bedtime)  lisinopril 10 mg oral tablet: 1 tab(s) orally once a day  pantoprazole 40 mg oral delayed release tablet: 1 tab(s) orally once a day  simvastatin 20 mg oral tablet: 1 tab(s) orally once a day (at bedtime)  tamsulosin 0.4 mg oral capsule: 1 cap(s) orally once a day

## 2023-12-20 NOTE — DISCHARGE NOTE PROVIDER - NSDCFUSCHEDAPPT_GEN_ALL_CORE_FT
Danny Odell  [facility]  [address]  Scheduled Appointment: 01/11/2024    [facility]  CARDIOLOGY [address] [facility]  Scheduled Appointment: 01/18/2024     Danny Odell  [facility]  [address]  Scheduled Appointment: 01/11/2024    [facility]  CARDIOLOGY [address] [facility]  Scheduled Appointment: 01/18/2024

## 2023-12-20 NOTE — DISCHARGE NOTE PROVIDER - HOSPITAL COURSE
77 yo m with pmh of HTN, DLP, BPH, CAD, PVD on ASA, presents with c/o LLE pain and inability to walk.  pt had a mechanical trip and fall 9 days ago.  pt had hit the top of his head and possibly rolled his foot/ankle.  pt uses a walker at baseline. Noted by wife to have increasing difficulty walking the last few days, fell twice during last month while he was walking in the dark and today unable to get out of bed due to L foot/ankle/leg pain.    He denies any chest pain, SOB, dizziness, diarrhea, urinary sx    In the ED: BP:  114/53, HR: 80, RR: 18, Temp: 98.2, SpO2: 99 on Ra    Labs:  -> WBC: 8.9, H.1, MCV: 94, plt: 201  -> Na: 133, K: 5.9  hemolyzed, crea 1,3 ( was 1 in 2022)    CT ankle/Foot:  1. Apparent nondisplaced fractures in the first metatarsal head and base of the first distal phalanx, correlate with point tenderness.  2. Diffuse osteopenia without additional acute osseous abnormality.  3. Subcutaneous hemorrhage along the dorsal forefoot.    VA duplex venous: No DVT    CT Head:  No acute intracranial pathology. No evidence of midline shift, mass effect or intracranial hemorrhage.  Chronic bilateral cerebellar infarcts.    Admitted to medicine for further evaluation      Hospital Course:   #Falls most probably mechanical  - CTH chronic cerebellar infarct, possibly causing instability?  - f/u orthostatic  - Consider PT eval when weight bearing status assessed   - f/u EP for PPM interrogation  - f/u echo     #Metatarsal fracture:  - CT: Apparent nondisplaced fractures in the first metatarsal head and base of the first distal phalanx,  - f/u Podiatry cs    #BOBBY  - Baseline crea 1  - Home Lasix on Hold   - Trend crea  - f/u CPK    #HTN  #s/p CABG  #s/p PTCA/stent of RCA  #s/p PPM  - c/w Home ASA 81 QD  - HOLDING home Lasix 40 QD   - c/w home Lisinopril 10 QD    #DM  - f/u A1c  - SS     #Neuropathy:  - c/w Home Citalopram 40 QD  - c/w Home gabapentin 300 AM, 600 PM      The patient has signed out AMA. 79 yo m with pmh of HTN, DLP, BPH, CAD, PVD on ASA, presents with c/o LLE pain and inability to walk.  pt had a mechanical trip and fall 9 days ago.  pt had hit the top of his head and possibly rolled his foot/ankle.  pt uses a walker at baseline. Noted by wife to have increasing difficulty walking the last few days, fell twice during last month while he was walking in the dark and today unable to get out of bed due to L foot/ankle/leg pain.    He denies any chest pain, SOB, dizziness, diarrhea, urinary sx    In the ED: BP:  114/53, HR: 80, RR: 18, Temp: 98.2, SpO2: 99 on Ra    Labs:  -> WBC: 8.9, H.1, MCV: 94, plt: 201  -> Na: 133, K: 5.9  hemolyzed, crea 1,3 ( was 1 in 2022)    CT ankle/Foot:  1. Apparent nondisplaced fractures in the first metatarsal head and base of the first distal phalanx, correlate with point tenderness.  2. Diffuse osteopenia without additional acute osseous abnormality.  3. Subcutaneous hemorrhage along the dorsal forefoot.    VA duplex venous: No DVT    CT Head:  No acute intracranial pathology. No evidence of midline shift, mass effect or intracranial hemorrhage.  Chronic bilateral cerebellar infarcts.    Admitted to medicine for further evaluation      Hospital Course:   #Falls most probably mechanical  - CTH chronic cerebellar infarct, possibly causing instability?  - f/u orthostatic  - Consider PT eval when weight bearing status assessed   - f/u EP for PPM interrogation  - f/u echo     #Metatarsal fracture:  - CT: Apparent nondisplaced fractures in the first metatarsal head and base of the first distal phalanx,  - f/u Podiatry cs    #BOBBY  - Baseline crea 1  - Home Lasix on Hold   - Trend crea  - f/u CPK    #HTN  #s/p CABG  #s/p PTCA/stent of RCA  #s/p PPM  - c/w Home ASA 81 QD  - HOLDING home Lasix 40 QD   - c/w home Lisinopril 10 QD    #DM  - f/u A1c  - SS     #Neuropathy:  - c/w Home Citalopram 40 QD  - c/w Home gabapentin 300 AM, 600 PM      The patient has signed out AMA.

## 2023-12-21 LAB
A1C WITH ESTIMATED AVERAGE GLUCOSE RESULT: 5.6 % — SIGNIFICANT CHANGE UP (ref 4–5.6)
A1C WITH ESTIMATED AVERAGE GLUCOSE RESULT: 5.6 % — SIGNIFICANT CHANGE UP (ref 4–5.6)
ESTIMATED AVERAGE GLUCOSE: 114 MG/DL — SIGNIFICANT CHANGE UP (ref 68–114)
ESTIMATED AVERAGE GLUCOSE: 114 MG/DL — SIGNIFICANT CHANGE UP (ref 68–114)

## 2023-12-27 DIAGNOSIS — M85.872 OTHER SPECIFIED DISORDERS OF BONE DENSITY AND STRUCTURE, LEFT ANKLE AND FOOT: ICD-10-CM

## 2023-12-27 DIAGNOSIS — Z86.73 PERSONAL HISTORY OF TRANSIENT ISCHEMIC ATTACK (TIA), AND CEREBRAL INFARCTION WITHOUT RESIDUAL DEFICITS: ICD-10-CM
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
## 2023-12-27 DIAGNOSIS — I10 ESSENTIAL (PRIMARY) HYPERTENSION: ICD-10-CM

## 2023-12-27 DIAGNOSIS — Z86.718 PERSONAL HISTORY OF OTHER VENOUS THROMBOSIS AND EMBOLISM: ICD-10-CM

## 2023-12-27 DIAGNOSIS — Z53.29 PROCEDURE AND TREATMENT NOT CARRIED OUT BECAUSE OF PATIENT'S DECISION FOR OTHER REASONS: ICD-10-CM

## 2023-12-27 DIAGNOSIS — Z98.84 BARIATRIC SURGERY STATUS: ICD-10-CM

## 2023-12-27 DIAGNOSIS — S92.425A NONDISPLACED FRACTURE OF DISTAL PHALANX OF LEFT GREAT TOE, INITIAL ENCOUNTER FOR CLOSED FRACTURE: ICD-10-CM

## 2023-12-27 DIAGNOSIS — N40.0 BENIGN PROSTATIC HYPERPLASIA WITHOUT LOWER URINARY TRACT SYMPTOMS: ICD-10-CM

## 2023-12-27 DIAGNOSIS — Z87.891 PERSONAL HISTORY OF NICOTINE DEPENDENCE: ICD-10-CM

## 2023-12-27 DIAGNOSIS — M79.672 PAIN IN LEFT FOOT: ICD-10-CM

## 2023-12-27 DIAGNOSIS — S92.315A NONDISPLACED FRACTURE OF FIRST METATARSAL BONE, LEFT FOOT, INITIAL ENCOUNTER FOR CLOSED FRACTURE: ICD-10-CM

## 2023-12-27 DIAGNOSIS — Z79.899 OTHER LONG TERM (CURRENT) DRUG THERAPY: ICD-10-CM

## 2023-12-27 DIAGNOSIS — E11.40 TYPE 2 DIABETES MELLITUS WITH DIABETIC NEUROPATHY, UNSPECIFIED: ICD-10-CM

## 2023-12-27 DIAGNOSIS — I25.10 ATHEROSCLEROTIC HEART DISEASE OF NATIVE CORONARY ARTERY WITHOUT ANGINA PECTORIS: ICD-10-CM

## 2023-12-27 DIAGNOSIS — E78.00 PURE HYPERCHOLESTEROLEMIA, UNSPECIFIED: ICD-10-CM

## 2023-12-27 DIAGNOSIS — E66.9 OBESITY, UNSPECIFIED: ICD-10-CM

## 2023-12-27 DIAGNOSIS — S90.32XA CONTUSION OF LEFT FOOT, INITIAL ENCOUNTER: ICD-10-CM

## 2023-12-27 DIAGNOSIS — Z95.0 PRESENCE OF CARDIAC PACEMAKER: ICD-10-CM

## 2023-12-27 DIAGNOSIS — Z95.1 PRESENCE OF AORTOCORONARY BYPASS GRAFT: ICD-10-CM

## 2023-12-27 DIAGNOSIS — S91.115A LACERATION WITHOUT FOREIGN BODY OF LEFT LESSER TOE(S) WITHOUT DAMAGE TO NAIL, INITIAL ENCOUNTER: ICD-10-CM

## 2023-12-27 DIAGNOSIS — E11.51 TYPE 2 DIABETES MELLITUS WITH DIABETIC PERIPHERAL ANGIOPATHY WITHOUT GANGRENE: ICD-10-CM

## 2023-12-27 DIAGNOSIS — Z95.5 PRESENCE OF CORONARY ANGIOPLASTY IMPLANT AND GRAFT: ICD-10-CM

## 2023-12-27 DIAGNOSIS — R29.6 REPEATED FALLS: ICD-10-CM

## 2023-12-27 DIAGNOSIS — N17.9 ACUTE KIDNEY FAILURE, UNSPECIFIED: ICD-10-CM
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
## 2023-12-28 ENCOUNTER — OUTPATIENT (OUTPATIENT)
Dept: OUTPATIENT SERVICES | Facility: HOSPITAL | Age: 78
LOS: 1 days | End: 2023-12-28
Payer: MEDICARE

## 2023-12-28 ENCOUNTER — APPOINTMENT (OUTPATIENT)
Dept: PODIATRY | Facility: CLINIC | Age: 78
End: 2023-12-28
Payer: MEDICARE

## 2023-12-28 DIAGNOSIS — Z95.0 PRESENCE OF CARDIAC PACEMAKER: Chronic | ICD-10-CM

## 2023-12-28 DIAGNOSIS — Z00.00 ENCOUNTER FOR GENERAL ADULT MEDICAL EXAMINATION WITHOUT ABNORMAL FINDINGS: ICD-10-CM

## 2023-12-28 DIAGNOSIS — Z98.890 OTHER SPECIFIED POSTPROCEDURAL STATES: Chronic | ICD-10-CM

## 2023-12-28 DIAGNOSIS — Z95.1 PRESENCE OF AORTOCORONARY BYPASS GRAFT: Chronic | ICD-10-CM

## 2023-12-28 DIAGNOSIS — Z98.84 BARIATRIC SURGERY STATUS: Chronic | ICD-10-CM

## 2023-12-28 DIAGNOSIS — S92.919A UNSPECIFIED FRACTURE OF UNSPECIFIED TOE(S), INITIAL ENCOUNTER FOR CLOSED FRACTURE: ICD-10-CM

## 2023-12-28 DIAGNOSIS — S92.309A FRACTURE OF UNSPECIFIED METATARSAL BONE(S), UNSPECIFIED FOOT, INITIAL ENCOUNTER FOR CLOSED FRACTURE: ICD-10-CM

## 2023-12-28 PROCEDURE — 99203 OFFICE O/P NEW LOW 30 MIN: CPT | Mod: GC

## 2023-12-28 PROCEDURE — 99203 OFFICE O/P NEW LOW 30 MIN: CPT

## 2023-12-29 PROBLEM — S92.309A METATARSAL FRACTURE: Status: ACTIVE | Noted: 2023-12-29

## 2023-12-29 NOTE — HISTORY OF PRESENT ILLNESS
[FreeTextEntry1] : patient fell 3 weeks prior and is presenting today for pain Was seen in ED with non displaced fracture noted to L foot metatarsal patient has been using darco shoe and compression foot is not in much pain but is complaining of knee pain

## 2023-12-29 NOTE — PHYSICAL EXAM
[2+] : left foot dorsalis pedis 2+ [Skin Lesions] : no skin lesions [de-identified] : left foot slightly swollen on dorsum with possible hematoma formation [Foot Ulcer] : no foot ulcer [Diminished Throughout Right Foot] : normal sensation with monofilament testing throughout right foot [Diminished Throughout Left Foot] : normal sensation with monofilament testing throughout left foot

## 2023-12-29 NOTE — ASSESSMENT
[Verbal] : verbal [Patient] : patient [Spouse] : spouse [FreeTextEntry1] : explained to patient this fracture is treated with conservative treatment and partial weightbearing using CAM boot.  CAM boot Rx dispensed XR ordered to be completed before next visit in 4 weeks  L knee pain needs to be addressed by ortho Patient wanted tramadol but was denied as foot pain was not severe enough for this medication

## 2023-12-29 NOTE — END OF VISIT
[] : Resident [FreeTextEntry3] : CT reviewed no surgical intervenstion  pt reports  foot pain.  rx CAM boot repeat xrays 4 weeks
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
## 2024-01-04 DIAGNOSIS — S92.309A FRACTURE OF UNSPECIFIED METATARSAL BONE(S), UNSPECIFIED FOOT, INITIAL ENCOUNTER FOR CLOSED FRACTURE: ICD-10-CM
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
## 2024-01-04 DIAGNOSIS — S92.425A NONDISPLACED FRACTURE OF DISTAL PHALANX OF LEFT GREAT TOE, INITIAL ENCOUNTER FOR CLOSED FRACTURE: ICD-10-CM

## 2024-01-11 ENCOUNTER — APPOINTMENT (OUTPATIENT)
Dept: ORTHOPEDIC SURGERY | Facility: CLINIC | Age: 79
End: 2024-01-11
Payer: MEDICARE

## 2024-01-11 DIAGNOSIS — E66.01 MORBID (SEVERE) OBESITY DUE TO EXCESS CALORIES: ICD-10-CM

## 2024-01-11 DIAGNOSIS — S92.425A NONDISPLACED FRACTURE OF DISTAL PHALANX OF LEFT GREAT TOE, INITIAL ENCOUNTER FOR CLOSED FRACTURE: ICD-10-CM

## 2024-01-11 DIAGNOSIS — M17.0 BILATERAL PRIMARY OSTEOARTHRITIS OF KNEE: ICD-10-CM

## 2024-01-11 PROCEDURE — 99213 OFFICE O/P EST LOW 20 MIN: CPT

## 2024-01-11 NOTE — REASON FOR VISIT
[Spouse] : spouse [FreeTextEntry2] : Patient is here for bilateral knee pain Recent fall couple weeks ago went to the ER had x-rays and CT showing fracture first metatarsal has some swelling using a walker going to [place] February for a few months only lost 4 or 5 pounds Using tramadol

## 2024-01-11 NOTE — ASSESSMENT
[FreeTextEntry1] : discussed repeating a cortisone injection in both knees today the option of a cortisone injection in both knees was discussed with the patient today. risks and benefits were reviewed and consent was given. with sterile technique through a lateral approach 5 cc dexamethasone (20 mg) and 5 cc 1% lidocaine was infiltrated with good effect and a Band-Aids were applied ice was recommended   I will plan to see him in June possibly to repeat them  again emphasized the importance to try to get some weight off.

## 2024-01-11 NOTE — HISTORY OF PRESENT ILLNESS
[de-identified] : Patient Complaint - left knee pain has been stable flared up  few months ago  has put on weight still has a quad cane\par  mostly for his stroke gel injection was back in October 2020\par  History of Present Illness\par  77-year-old gentleman for follow-up of his left knee pain longstanding issues some pain in the right the right had gel\par  injection 4 years ago which helped recently been having some pain again does have a cane for multiple medical\par  comorbidities he did have bariatric surgery when his weight was up to touch 400 currently 295 he has also had open\par  heart surgery and circulatory issues in his legs.  is current medical doctor \par  he has hypertension diabetes and hypothyroidism does take baby aspirin did have a gel injection 2  years ago which\par  gave him some relief does use a knee sleeve has lost 10 lb is using a cane\par   since last visit the right knee has been giving him a lot more pain as well

## 2024-01-11 NOTE — IMAGING
[de-identified] :  swelling left and right knee limited motion crepitus antalgic gait venous stasis changes in leg some edema no open wounds or ulcers does have a knee sleeve Left foot swelling diffuse tenderness ecchymosis  X-rays CT note proximal phalanx and first metatarsal fracture

## 2024-01-18 ENCOUNTER — NON-APPOINTMENT (OUTPATIENT)
Age: 79
End: 2024-01-18

## 2024-01-18 ENCOUNTER — APPOINTMENT (OUTPATIENT)
Dept: CARDIOLOGY | Facility: CLINIC | Age: 79
End: 2024-01-18
Payer: MEDICARE

## 2024-01-19 PROCEDURE — 93294 REM INTERROG EVL PM/LDLS PM: CPT

## 2024-01-19 PROCEDURE — 93296 REM INTERROG EVL PM/IDS: CPT

## 2024-01-28 ENCOUNTER — INPATIENT (INPATIENT)
Facility: HOSPITAL | Age: 79
LOS: 1 days | Discharge: HOME HEALTH | DRG: 871 | End: 2024-01-30
Attending: HOSPITALIST | Admitting: HOSPITALIST
Payer: MEDICARE

## 2024-01-28 VITALS
DIASTOLIC BLOOD PRESSURE: 53 MMHG | SYSTOLIC BLOOD PRESSURE: 110 MMHG | HEART RATE: 88 BPM | OXYGEN SATURATION: 97 % | TEMPERATURE: 99 F | RESPIRATION RATE: 17 BRPM

## 2024-01-28 DIAGNOSIS — Z95.1 PRESENCE OF AORTOCORONARY BYPASS GRAFT: Chronic | ICD-10-CM

## 2024-01-28 DIAGNOSIS — Z98.84 BARIATRIC SURGERY STATUS: Chronic | ICD-10-CM

## 2024-01-28 DIAGNOSIS — Z95.0 PRESENCE OF CARDIAC PACEMAKER: Chronic | ICD-10-CM

## 2024-01-28 DIAGNOSIS — R53.1 WEAKNESS: ICD-10-CM

## 2024-01-28 DIAGNOSIS — Z98.890 OTHER SPECIFIED POSTPROCEDURAL STATES: Chronic | ICD-10-CM

## 2024-01-28 LAB
ALBUMIN SERPL ELPH-MCNC: 3.6 G/DL — SIGNIFICANT CHANGE UP (ref 3.5–5.2)
ALP SERPL-CCNC: 97 U/L — SIGNIFICANT CHANGE UP (ref 30–115)
ALT FLD-CCNC: 8 U/L — SIGNIFICANT CHANGE UP (ref 0–41)
ANION GAP SERPL CALC-SCNC: 8 MMOL/L — SIGNIFICANT CHANGE UP (ref 7–14)
APTT BLD: 30.9 SEC — SIGNIFICANT CHANGE UP (ref 27–39.2)
AST SERPL-CCNC: 15 U/L — SIGNIFICANT CHANGE UP (ref 0–41)
BASOPHILS # BLD AUTO: 0.03 K/UL — SIGNIFICANT CHANGE UP (ref 0–0.2)
BASOPHILS NFR BLD AUTO: 0.2 % — SIGNIFICANT CHANGE UP (ref 0–1)
BILIRUB SERPL-MCNC: 0.8 MG/DL — SIGNIFICANT CHANGE UP (ref 0.2–1.2)
BUN SERPL-MCNC: 44 MG/DL — HIGH (ref 10–20)
CALCIUM SERPL-MCNC: 8.5 MG/DL — SIGNIFICANT CHANGE UP (ref 8.4–10.4)
CHLORIDE SERPL-SCNC: 99 MMOL/L — SIGNIFICANT CHANGE UP (ref 98–110)
CO2 SERPL-SCNC: 28 MMOL/L — SIGNIFICANT CHANGE UP (ref 17–32)
CREAT SERPL-MCNC: 1.9 MG/DL — HIGH (ref 0.7–1.5)
EGFR: 35 ML/MIN/1.73M2 — LOW
EOSINOPHIL # BLD AUTO: 0.01 K/UL — SIGNIFICANT CHANGE UP (ref 0–0.7)
EOSINOPHIL NFR BLD AUTO: 0.1 % — SIGNIFICANT CHANGE UP (ref 0–8)
FLUAV AG NPH QL: SIGNIFICANT CHANGE UP
FLUBV AG NPH QL: SIGNIFICANT CHANGE UP
GLUCOSE SERPL-MCNC: 117 MG/DL — HIGH (ref 70–99)
HCT VFR BLD CALC: 43.1 % — SIGNIFICANT CHANGE UP (ref 42–52)
HGB BLD-MCNC: 13.6 G/DL — LOW (ref 14–18)
IMM GRANULOCYTES NFR BLD AUTO: 0.5 % — HIGH (ref 0.1–0.3)
INR BLD: 1.03 RATIO — SIGNIFICANT CHANGE UP (ref 0.65–1.3)
LACTATE SERPL-SCNC: 2 MMOL/L — SIGNIFICANT CHANGE UP (ref 0.7–2)
LYMPHOCYTES # BLD AUTO: 0.85 K/UL — LOW (ref 1.2–3.4)
LYMPHOCYTES # BLD AUTO: 5.1 % — LOW (ref 20.5–51.1)
MCHC RBC-ENTMCNC: 31.2 PG — HIGH (ref 27–31)
MCHC RBC-ENTMCNC: 31.6 G/DL — LOW (ref 32–37)
MCV RBC AUTO: 98.9 FL — HIGH (ref 80–94)
MONOCYTES # BLD AUTO: 0.78 K/UL — HIGH (ref 0.1–0.6)
MONOCYTES NFR BLD AUTO: 4.7 % — SIGNIFICANT CHANGE UP (ref 1.7–9.3)
NEUTROPHILS # BLD AUTO: 14.8 K/UL — HIGH (ref 1.4–6.5)
NEUTROPHILS NFR BLD AUTO: 89.4 % — HIGH (ref 42.2–75.2)
NRBC # BLD: 0 /100 WBCS — SIGNIFICANT CHANGE UP (ref 0–0)
PLATELET # BLD AUTO: 134 K/UL — SIGNIFICANT CHANGE UP (ref 130–400)
PMV BLD: 12.3 FL — HIGH (ref 7.4–10.4)
POTASSIUM SERPL-MCNC: 5.9 MMOL/L — HIGH (ref 3.5–5)
POTASSIUM SERPL-SCNC: 5.9 MMOL/L — HIGH (ref 3.5–5)
PROT SERPL-MCNC: 6 G/DL — SIGNIFICANT CHANGE UP (ref 6–8)
PROTHROM AB SERPL-ACNC: 11.8 SEC — SIGNIFICANT CHANGE UP (ref 9.95–12.87)
RBC # BLD: 4.36 M/UL — LOW (ref 4.7–6.1)
RBC # FLD: 12.4 % — SIGNIFICANT CHANGE UP (ref 11.5–14.5)
RSV RNA NPH QL NAA+NON-PROBE: SIGNIFICANT CHANGE UP
SARS-COV-2 RNA SPEC QL NAA+PROBE: SIGNIFICANT CHANGE UP
SODIUM SERPL-SCNC: 135 MMOL/L — SIGNIFICANT CHANGE UP (ref 135–146)
WBC # BLD: 16.56 K/UL — HIGH (ref 4.8–10.8)
WBC # FLD AUTO: 16.56 K/UL — HIGH (ref 4.8–10.8)

## 2024-01-28 PROCEDURE — 71250 CT THORAX DX C-: CPT

## 2024-01-28 PROCEDURE — 97162 PT EVAL MOD COMPLEX 30 MIN: CPT | Mod: GP

## 2024-01-28 PROCEDURE — 86140 C-REACTIVE PROTEIN: CPT

## 2024-01-28 PROCEDURE — 74176 CT ABD & PELVIS W/O CONTRAST: CPT | Mod: 26

## 2024-01-28 PROCEDURE — 93010 ELECTROCARDIOGRAM REPORT: CPT

## 2024-01-28 PROCEDURE — 74176 CT ABD & PELVIS W/O CONTRAST: CPT

## 2024-01-28 PROCEDURE — 81001 URINALYSIS AUTO W/SCOPE: CPT

## 2024-01-28 PROCEDURE — 85652 RBC SED RATE AUTOMATED: CPT

## 2024-01-28 PROCEDURE — 80053 COMPREHEN METABOLIC PANEL: CPT

## 2024-01-28 PROCEDURE — 87086 URINE CULTURE/COLONY COUNT: CPT

## 2024-01-28 PROCEDURE — 71045 X-RAY EXAM CHEST 1 VIEW: CPT | Mod: 26

## 2024-01-28 PROCEDURE — 76770 US EXAM ABDO BACK WALL COMP: CPT

## 2024-01-28 PROCEDURE — 93005 ELECTROCARDIOGRAM TRACING: CPT

## 2024-01-28 PROCEDURE — 97116 GAIT TRAINING THERAPY: CPT | Mod: GP

## 2024-01-28 PROCEDURE — 84145 PROCALCITONIN (PCT): CPT

## 2024-01-28 PROCEDURE — 99223 1ST HOSP IP/OBS HIGH 75: CPT

## 2024-01-28 PROCEDURE — 36415 COLL VENOUS BLD VENIPUNCTURE: CPT

## 2024-01-28 PROCEDURE — 83735 ASSAY OF MAGNESIUM: CPT

## 2024-01-28 PROCEDURE — 87899 AGENT NOS ASSAY W/OPTIC: CPT

## 2024-01-28 PROCEDURE — 85025 COMPLETE CBC W/AUTO DIFF WBC: CPT

## 2024-01-28 PROCEDURE — 84100 ASSAY OF PHOSPHORUS: CPT

## 2024-01-28 PROCEDURE — 87449 NOS EACH ORGANISM AG IA: CPT

## 2024-01-28 PROCEDURE — 82533 TOTAL CORTISOL: CPT

## 2024-01-28 PROCEDURE — 99285 EMERGENCY DEPT VISIT HI MDM: CPT

## 2024-01-28 PROCEDURE — 83605 ASSAY OF LACTIC ACID: CPT

## 2024-01-28 RX ORDER — TAMSULOSIN HYDROCHLORIDE 0.4 MG/1
1 CAPSULE ORAL
Qty: 0 | Refills: 0 | DISCHARGE

## 2024-01-28 RX ORDER — PANTOPRAZOLE SODIUM 20 MG/1
1 TABLET, DELAYED RELEASE ORAL
Refills: 0 | DISCHARGE

## 2024-01-28 RX ORDER — LISINOPRIL 2.5 MG/1
1 TABLET ORAL
Refills: 0 | DISCHARGE

## 2024-01-28 RX ORDER — CITALOPRAM 10 MG/1
40 TABLET, FILM COATED ORAL DAILY
Refills: 0 | Status: DISCONTINUED | OUTPATIENT
Start: 2024-01-28 | End: 2024-01-30

## 2024-01-28 RX ORDER — SIMVASTATIN 20 MG/1
20 TABLET, FILM COATED ORAL AT BEDTIME
Refills: 0 | Status: DISCONTINUED | OUTPATIENT
Start: 2024-01-28 | End: 2024-01-30

## 2024-01-28 RX ORDER — GABAPENTIN 400 MG/1
300 CAPSULE ORAL DAILY
Refills: 0 | Status: DISCONTINUED | OUTPATIENT
Start: 2024-01-28 | End: 2024-01-30

## 2024-01-28 RX ORDER — CEFTRIAXONE 500 MG/1
1000 INJECTION, POWDER, FOR SOLUTION INTRAMUSCULAR; INTRAVENOUS EVERY 24 HOURS
Refills: 0 | Status: DISCONTINUED | OUTPATIENT
Start: 2024-01-28 | End: 2024-01-30

## 2024-01-28 RX ORDER — ASPIRIN/CALCIUM CARB/MAGNESIUM 324 MG
1 TABLET ORAL
Qty: 0 | Refills: 0 | DISCHARGE

## 2024-01-28 RX ORDER — ASPIRIN/CALCIUM CARB/MAGNESIUM 324 MG
81 TABLET ORAL DAILY
Refills: 0 | Status: DISCONTINUED | OUTPATIENT
Start: 2024-01-28 | End: 2024-01-30

## 2024-01-28 RX ORDER — AZITHROMYCIN 500 MG/1
500 TABLET, FILM COATED ORAL EVERY 24 HOURS
Refills: 0 | Status: DISCONTINUED | OUTPATIENT
Start: 2024-01-29 | End: 2024-01-30

## 2024-01-28 RX ORDER — AZITHROMYCIN 500 MG/1
500 TABLET, FILM COATED ORAL ONCE
Refills: 0 | Status: COMPLETED | OUTPATIENT
Start: 2024-01-28 | End: 2024-01-28

## 2024-01-28 RX ORDER — SIMVASTATIN 20 MG/1
1 TABLET, FILM COATED ORAL
Qty: 0 | Refills: 0 | DISCHARGE

## 2024-01-28 RX ORDER — PANTOPRAZOLE SODIUM 20 MG/1
40 TABLET, DELAYED RELEASE ORAL
Refills: 0 | Status: DISCONTINUED | OUTPATIENT
Start: 2024-01-28 | End: 2024-01-30

## 2024-01-28 RX ORDER — CITALOPRAM 10 MG/1
1 TABLET, FILM COATED ORAL
Refills: 0 | DISCHARGE

## 2024-01-28 RX ORDER — HEPARIN SODIUM 5000 [USP'U]/ML
5000 INJECTION INTRAVENOUS; SUBCUTANEOUS EVERY 12 HOURS
Refills: 0 | Status: DISCONTINUED | OUTPATIENT
Start: 2024-01-28 | End: 2024-01-30

## 2024-01-28 RX ORDER — AZITHROMYCIN 500 MG/1
TABLET, FILM COATED ORAL
Refills: 0 | Status: DISCONTINUED | OUTPATIENT
Start: 2024-01-28 | End: 2024-01-30

## 2024-01-28 RX ORDER — CITALOPRAM 10 MG/1
0 TABLET, FILM COATED ORAL
Qty: 0 | Refills: 0 | DISCHARGE

## 2024-01-28 RX ORDER — FUROSEMIDE 40 MG
1 TABLET ORAL
Qty: 0 | Refills: 0 | DISCHARGE

## 2024-01-28 RX ORDER — SODIUM CHLORIDE 9 MG/ML
1000 INJECTION, SOLUTION INTRAVENOUS
Refills: 0 | Status: DISCONTINUED | OUTPATIENT
Start: 2024-01-28 | End: 2024-01-29

## 2024-01-28 RX ORDER — LISINOPRIL 2.5 MG/1
1 TABLET ORAL
Qty: 0 | Refills: 0 | DISCHARGE

## 2024-01-28 RX ORDER — FAMOTIDINE 10 MG/ML
1 INJECTION INTRAVENOUS
Qty: 0 | Refills: 0 | DISCHARGE

## 2024-01-28 RX ORDER — SODIUM CHLORIDE 9 MG/ML
1000 INJECTION INTRAMUSCULAR; INTRAVENOUS; SUBCUTANEOUS ONCE
Refills: 0 | Status: COMPLETED | OUTPATIENT
Start: 2024-01-28 | End: 2024-01-28

## 2024-01-28 RX ORDER — TAMSULOSIN HYDROCHLORIDE 0.4 MG/1
0.4 CAPSULE ORAL AT BEDTIME
Refills: 0 | Status: DISCONTINUED | OUTPATIENT
Start: 2024-01-28 | End: 2024-01-29

## 2024-01-28 RX ORDER — SODIUM ZIRCONIUM CYCLOSILICATE 10 G/10G
5 POWDER, FOR SUSPENSION ORAL ONCE
Refills: 0 | Status: COMPLETED | OUTPATIENT
Start: 2024-01-28 | End: 2024-01-28

## 2024-01-28 RX ORDER — FUROSEMIDE 40 MG
1 TABLET ORAL
Refills: 0 | DISCHARGE

## 2024-01-28 RX ORDER — ACETAMINOPHEN 500 MG
650 TABLET ORAL ONCE
Refills: 0 | Status: COMPLETED | OUTPATIENT
Start: 2024-01-28 | End: 2024-01-28

## 2024-01-28 RX ADMIN — SODIUM CHLORIDE 1000 MILLILITER(S): 9 INJECTION INTRAMUSCULAR; INTRAVENOUS; SUBCUTANEOUS at 15:00

## 2024-01-28 RX ADMIN — TAMSULOSIN HYDROCHLORIDE 0.4 MILLIGRAM(S): 0.4 CAPSULE ORAL at 22:26

## 2024-01-28 RX ADMIN — SODIUM ZIRCONIUM CYCLOSILICATE 5 GRAM(S): 10 POWDER, FOR SUSPENSION ORAL at 16:47

## 2024-01-28 RX ADMIN — SIMVASTATIN 20 MILLIGRAM(S): 20 TABLET, FILM COATED ORAL at 22:26

## 2024-01-28 RX ADMIN — Medication 650 MILLIGRAM(S): at 16:47

## 2024-01-28 RX ADMIN — AZITHROMYCIN 255 MILLIGRAM(S): 500 TABLET, FILM COATED ORAL at 18:46

## 2024-01-28 NOTE — ED ADULT NURSE NOTE - NSFALLHARMRISKINTERV_ED_ALL_ED

## 2024-01-28 NOTE — ED PROVIDER NOTE - PHYSICAL EXAMINATION
VITAL SIGNS: I have reviewed nursing notes and confirm.  CONSTITUTIONAL: well-appearing, non-toxic, NAD  SKIN: Warm dry, normal skin turgor  HEAD: NCAT  EYES: EOMI, PERRLA, no scleral icterus  ENT: Moist mucous membranes, normal pharynx with no erythema or exudates  NECK: Supple; non tender. Full ROM.   CARD: RRR, no murmurs, rubs or gallops  RESP: clear to ausculation b/l.  No rales, rhonchi, or wheezing.  ABD: soft, + BS, non-tender, non-distended, no rebound or guarding. No CVA tenderness  EXT: Weakness moving bilateral lower extremities, no bony tenderness, (+) mild pedal edema, no calf tenderness  NEURO: normal sensory. CN II-XII intact  PSYCH: Cooperative, appropriate.

## 2024-01-28 NOTE — H&P ADULT - HISTORY OF PRESENT ILLNESS
Patient is an 78-year-old male past medical history of hypertension, hyperlipidemia, CAD, PVD on aspirin 81 mg daily presenting for evaluation of generalized weakness, difficulty ambulating since yesterday.  Patient's wife at bedside states he has had fever this morning and cough.  They deny recent travel, sick contacts, chest pain, shortness of breath, abdominal pain, nausea, vomiting.    In ED: BP: 110/53, HR: 88, RR: 17, temp: 98.6, spo2: 97% on 6L NC  Labs significant for WBC: 16.5K, creatinine 1.9 (baseline 1), K: 5.9 Patient is an 78-year-old non smoker male past medical history of hypertension, hyperlipidemia, CAD, PVD on aspirin 81 mg daily presenting for evaluation of generalized weakness, difficulty ambulating since yesterday.  He feels dizzy / unsteady. Did not fall / lose consciousness Patient's wife at bedside states he has had fever this morning and cough. No sob / chest pain. Since arriving to the ED, he had 6 episodes of watery non-bloody diarrhea, He denies nausea/ vomiting / abdominal pain / decreased appetite. No recent antibiotic use. Wife reports fever of "100 point something" at home.     In ED: BP: 110/53, HR: 88, RR: 17, temp: 98.6, spo2: 97% on 6L NC  Labs significant for WBC: 16.5K, creatinine 1.9 (baseline 1), K: 5.9  CXR: right sided opacities     Given lokelma, fluids, admitted to medicine

## 2024-01-28 NOTE — ED ADULT NURSE NOTE - OBJECTIVE STATEMENT
Patient presents to ED with complaints of generalized weakness and difficulty ambulating since yesterday.  Patient's wife at bedside states he has had fever this morning and cough. Patient having multiple episodes of loose, watery stools.

## 2024-01-28 NOTE — ED PROVIDER NOTE - OBJECTIVE STATEMENT
Patient is an 78-year-old male past medical history of hypertension, hyperlipidemia, CAD, PVD on aspirin 81 mg daily presenting for evaluation of generalized weakness, difficulty ambulating since yesterday.  Patient's wife at bedside states he has had fever this morning and cough.  They deny recent travel, sick contacts, chest pain, shortness of breath, abdominal pain, nausea, vomiting.  Patient's wife notes that he is up-to-date on his COVID, RSV, and pneumonia vaccines.

## 2024-01-28 NOTE — H&P ADULT - ATTENDING COMMENTS
78-year-old non smoker male past medical history of hypertension, hyperlipidemia, CAD, PVD on aspirin 81 mg daily presenting for evaluation of generalized weakness, difficulty ambulating since yesterday.     Agree  with assessment  except for changes below.   Vital Signs (24 Hrs):  T(C): 38.3 (01-28-24 @ 15:00), Max: 38.3 (01-28-24 @ 15:00)  HR: 88 (01-28-24 @ 13:23) (88 - 88)  BP: 110/53 (01-28-24 @ 13:23) (110/53 - 110/53)  RR: 17 (01-28-24 @ 13:23) (17 - 17)  SpO2: 97% (01-28-24 @ 13:23) (97% - 97%)    Chest X-Ray: Right-sided opacities. Follow-up to resolution is advised.    IMPRESSION  Acute hypoxic respiratory failure 2/2 Community acquired pneumonia   Sepsis on admission Likely Secondary to Pneumonia   Diarrhea x 6 episodes  Titrate supplemental O2 to maintain SpO2 92-96%;  Avoid hyperoxia and wean off O2 gradually   BIPAP as needed.   f/u   ABG   - possible  Component of deconditioning   - check cultures, stool c.diff & GI PCR, UA   - influenza/covid negative  - CXR Right sided opacities   -  ceftriaxone / azithro   - wean oxygen as tolerated (now at 3L)  - check CT abdomen/pelvis  - check lactate at 8pm    Hx  fo Fall   Decreased ambulation   1. Apparent nondisplaced fractures in the first metatarsal head and base   of the first distal phalanx, correlate with point tenderness.  2. Diffuse osteopenia without additional acute osseous abnormality.  3. Subcutaneous hemorrhage along the dorsal forefoot.  PT rehab       BOBBY   Hyperkalemia   Hx PBH   Baseline creatinine: 1.0 Creatinine today 1.9, Consider Renal US,  Avoid nephrotoxic agents, Monitor BUN/creatinine, Send  Urine Lytes, S/p Lokelma, Hold Lasix and Lisinopril   Renal Dose  medications   c/w Flowmax   Repeat BMP     Hx CAD s/p CABG and PCI.   Hx HLD, PVD   Continue ASA, and Statin.    HTN: hold  Lasix and Lisinopril in the setting of BOBBY 78-year-old non smoker male past medical history of hypertension, hyperlipidemia, CAD, PVD on aspirin 81 mg daily presenting for evaluation of generalized weakness, difficulty ambulating since yesterday.     Agree  with assessment  except for changes below.   Vital Signs (24 Hrs):  T(C): 38.3 (01-28-24 @ 15:00), Max: 38.3 (01-28-24 @ 15:00)  HR: 88 (01-28-24 @ 13:23) (88 - 88)  BP: 110/53 (01-28-24 @ 13:23) (110/53 - 110/53)  RR: 17 (01-28-24 @ 13:23) (17 - 17)  SpO2: 97% (01-28-24 @ 13:23) (97% - 97%)    Chest X-Ray: Right-sided opacities. Follow-up to resolution is advised.    PHYSICAL EXAM  GENERAL: NAD, Elderly  Obese   HEAD:  NCAT, EOMI, MM  NECK: Supple, Nontender  NERVOUS SYSTEM:  AAOx3, NFD  CHEST/LUNG: +bs b/l, No wheezing   HEART: +s1s2 RRR  ABDOMEN: soft, NT/ND  EXTREMITIES:  pp, no edema  SKIN: age related skin changes     IMPRESSION  Acute hypoxic respiratory failure 2/2 Community acquired pneumonia   Sepsis on admission Likely Secondary to Pneumonia   Diarrhea x 6 episodes  Titrate supplemental O2 to maintain SpO2 92-96%;  Avoid hyperoxia and wean off O2 gradually   f/u   ABG   - possible  Component of deconditioning   - check cultures, stool c.diff & GI PCR, UA   - influenza/covid negative  - continue IVF   - CXR Right sided opacities   -  ceftriaxone / azithro   - wean oxygen as tolerated (now at 3L)  - check CT abdomen/pelvis  - check lactate at 8pm    Hx  fo Fall   Decreased ambulation   1. Apparent nondisplaced fractures in the first metatarsal head and base   of the first distal phalanx, correlate with point tenderness.  2. Diffuse osteopenia without additional acute osseous abnormality.  3. Subcutaneous hemorrhage along the dorsal forefoot.  PT rehab       BOBBY   Hyperkalemia   Hx BBH   Baseline creatinine: 1.0 Creatinine today 1.9, Consider Renal US,  Avoid nephrotoxic agents, Monitor BUN/creatinine, Send  Urine Lytes, S/p Lokelma, Hold Lasix and Lisinopril   Renal Dose  medications   c/w Flowmax   Repeat BMP     Hx CAD s/p CABG and PCI.   Hx HLD, PVD   Continue ASA, and Statin.    HTN: hold  Lasix and Lisinopril in the setting of BOBBY    Seen on 01/28

## 2024-01-28 NOTE — ED PROVIDER NOTE - CLINICAL SUMMARY MEDICAL DECISION MAKING FREE TEXT BOX
.    78-year-old male, past medical history, CAD, s/p CABG, s/p PPM, HLD, HTN, p/w worsening generalized weakness x 2 days, now on able to ambulate because of weakness.  + Fever and cough.  No vomiting, diarrhea, urinary symptoms.    Patient is tired appearing, dry MM, no respiratory distress, CTAB, abdomen soft, nondistended, no rebound or guarding, lower extremities nontender, patient is moving all extremities, but has some difficulty lifting bilateral legs due to feeling weak.    DDx viral illness VS PNA VS metabolic    All available lab tests, imaging tests, and EKGs independently reviewed and interpreted by me.  + BOBBY with hyperkalemia, EKG shows paced rhythm without peaked T waves, chest x-ray clear.  Patient got IVF.  During hospital course patient began to have multiple episodes of diffuse brown diarrhea.    IMP: Fever, cough, weakness, diarrhea; BOBBY, uremia, hyperkalemia.  Admit patient to [facility] for further workup, electrolyte repletion, IVF, specialist consult.    .

## 2024-01-28 NOTE — H&P ADULT - NSHPPHYSICALEXAM_GEN_ALL_CORE
VITALS:   T(C): 38.3 (01-28-24 @ 15:00), Max: 38.3 (01-28-24 @ 15:00)  HR: 88 (01-28-24 @ 13:23) (88 - 88)  BP: 110/53 (01-28-24 @ 13:23) (110/53 - 110/53)  RR: 17 (01-28-24 @ 13:23) (17 - 17)  SpO2: 97% (01-28-24 @ 13:23) (97% - 97%)    GENERAL: NAD, lying comfortably in bed  LUNG: Clear to auscultation bilaterally, No  rhonchi, No wheezing  HEART: Regular rate and rhythm, No murmurs  ABDOMEN: Soft, nontender, nondistended  EXTREMITIES:  No edema  NERVOUS SYSTEM:  A&Ox3  SKIN: No rashes or lesions

## 2024-01-28 NOTE — H&P ADULT - ASSESSMENT
#Acute hypoxic respiratory failure   #Sepsis on admission   #Diarrhea x 8 episodes  - check cultures, RVP, stool c.diff & GI PCR, UA   - check CT abdomen/pelvis    #BOBBY  - creatinine 1.9 (baseline 1)  - continue fluids  - bladder scan if suspicion of retention     DIET:   DVT prophylaxis:   CODE:   DISPO:    Patient is an 78-year-old non smoker male past medical history of hypertension, hyperlipidemia, CAD, PVD on aspirin 81 mg daily presenting for evaluation of generalized weakness, difficulty ambulating since yesterday.     #Acute hypoxic respiratory failure   #Sepsis on admission   #Community acquired pneumonia   #Diarrhea x 6 episodes  - check cultures, stool c.diff & GI PCR, UA   - influenza/covid negative  - CXR Right sided opacities   - start ceftriaxone / azithro   - wean oxygen as tolerated (now at 3L)  - check CT abdomen/pelvis  - check lactate at 8pm    #BOBBY - likely prerenal  #BPH  - creatinine 1.9 (baseline 1)  - continue fluids  - renal U/S to rule out obstruction  - continue tamsulosin    #HTN  - hold lasix & lisinopril    #HLD  #PVD  - continue aspirin & statin    DIET: DASH  DVT prophylaxis: heparin  CODE: full  DISPO: med/surg   Patient is an 78-year-old non smoker male past medical history of hypertension, hyperlipidemia, CAD, PVD on aspirin 81 mg daily presenting for evaluation of generalized weakness, difficulty ambulating since yesterday.     #Acute hypoxic respiratory failure   #Sepsis on admission   #Community acquired pneumonia   #Diarrhea x 6 episodes  - check cultures, stool c.diff & GI PCR, UA   - influenza/covid negative  - CXR Right sided opacities   - start ceftriaxone / azithro   - wean oxygen as tolerated (now at 3L)  - check CT abdomen/pelvis  - check lactate at 8pm    #BOBBY - likely prerenal  #BPH  - creatinine 1.9 (baseline 1)  - continue fluids  - renal U/S to rule out obstruction  - continue tamsulosin  - reduce gabapentin dose to 300mg daily given BOBBY. (takes 300mg in am, 300mg in pm, 600mg at bedtime)    #HTN  - hold lasix & lisinopril    #HLD  #PVD  - continue aspirin & statin    DIET: DASH  DVT prophylaxis: heparin  CODE: full  DISPO: med/surg

## 2024-01-29 ENCOUNTER — TRANSCRIPTION ENCOUNTER (OUTPATIENT)
Age: 79
End: 2024-01-29

## 2024-01-29 LAB
ALBUMIN SERPL ELPH-MCNC: 3.7 G/DL — SIGNIFICANT CHANGE UP (ref 3.5–5.2)
ALP SERPL-CCNC: 93 U/L — SIGNIFICANT CHANGE UP (ref 30–115)
ALT FLD-CCNC: 7 U/L — SIGNIFICANT CHANGE UP (ref 0–41)
ANION GAP SERPL CALC-SCNC: 8 MMOL/L — SIGNIFICANT CHANGE UP (ref 7–14)
APPEARANCE UR: CLEAR — SIGNIFICANT CHANGE UP
AST SERPL-CCNC: 15 U/L — SIGNIFICANT CHANGE UP (ref 0–41)
BACTERIA # UR AUTO: NEGATIVE /HPF — SIGNIFICANT CHANGE UP
BASOPHILS # BLD AUTO: 0.03 K/UL — SIGNIFICANT CHANGE UP (ref 0–0.2)
BASOPHILS NFR BLD AUTO: 0.4 % — SIGNIFICANT CHANGE UP (ref 0–1)
BILIRUB SERPL-MCNC: 0.8 MG/DL — SIGNIFICANT CHANGE UP (ref 0.2–1.2)
BILIRUB UR-MCNC: NEGATIVE — SIGNIFICANT CHANGE UP
BUN SERPL-MCNC: 47 MG/DL — HIGH (ref 10–20)
CALCIUM SERPL-MCNC: 8.6 MG/DL — SIGNIFICANT CHANGE UP (ref 8.4–10.5)
CAST: 1 /LPF — SIGNIFICANT CHANGE UP (ref 0–4)
CHLORIDE SERPL-SCNC: 101 MMOL/L — SIGNIFICANT CHANGE UP (ref 98–110)
CO2 SERPL-SCNC: 29 MMOL/L — SIGNIFICANT CHANGE UP (ref 17–32)
COLOR SPEC: YELLOW — SIGNIFICANT CHANGE UP
CREAT SERPL-MCNC: 1.7 MG/DL — HIGH (ref 0.7–1.5)
CRP SERPL-MCNC: 66.3 MG/L — HIGH
DIFF PNL FLD: NEGATIVE — SIGNIFICANT CHANGE UP
EGFR: 40 ML/MIN/1.73M2 — LOW
EOSINOPHIL # BLD AUTO: 0.25 K/UL — SIGNIFICANT CHANGE UP (ref 0–0.7)
EOSINOPHIL NFR BLD AUTO: 3 % — SIGNIFICANT CHANGE UP (ref 0–8)
ERYTHROCYTE [SEDIMENTATION RATE] IN BLOOD: 8 MM/HR — SIGNIFICANT CHANGE UP (ref 0–10)
GLUCOSE SERPL-MCNC: 93 MG/DL — SIGNIFICANT CHANGE UP (ref 70–99)
GLUCOSE UR QL: NEGATIVE MG/DL — SIGNIFICANT CHANGE UP
HCT VFR BLD CALC: 43.7 % — SIGNIFICANT CHANGE UP (ref 42–52)
HGB BLD-MCNC: 13.6 G/DL — LOW (ref 14–18)
IMM GRANULOCYTES NFR BLD AUTO: 0.4 % — HIGH (ref 0.1–0.3)
KETONES UR-MCNC: NEGATIVE MG/DL — SIGNIFICANT CHANGE UP
LACTATE SERPL-SCNC: 0.8 MMOL/L — SIGNIFICANT CHANGE UP (ref 0.7–2)
LEUKOCYTE ESTERASE UR-ACNC: ABNORMAL
LYMPHOCYTES # BLD AUTO: 1.67 K/UL — SIGNIFICANT CHANGE UP (ref 1.2–3.4)
LYMPHOCYTES # BLD AUTO: 20 % — LOW (ref 20.5–51.1)
MAGNESIUM SERPL-MCNC: 2.8 MG/DL — HIGH (ref 1.8–2.4)
MCHC RBC-ENTMCNC: 30.8 PG — SIGNIFICANT CHANGE UP (ref 27–31)
MCHC RBC-ENTMCNC: 31.1 G/DL — LOW (ref 32–37)
MCV RBC AUTO: 98.9 FL — HIGH (ref 80–94)
MONOCYTES # BLD AUTO: 0.73 K/UL — HIGH (ref 0.1–0.6)
MONOCYTES NFR BLD AUTO: 8.8 % — SIGNIFICANT CHANGE UP (ref 1.7–9.3)
NEUTROPHILS # BLD AUTO: 5.63 K/UL — SIGNIFICANT CHANGE UP (ref 1.4–6.5)
NEUTROPHILS NFR BLD AUTO: 67.4 % — SIGNIFICANT CHANGE UP (ref 42.2–75.2)
NITRITE UR-MCNC: NEGATIVE — SIGNIFICANT CHANGE UP
NRBC # BLD: 0 /100 WBCS — SIGNIFICANT CHANGE UP (ref 0–0)
PH UR: 6 — SIGNIFICANT CHANGE UP (ref 5–8)
PHOSPHATE SERPL-MCNC: 3.3 MG/DL — SIGNIFICANT CHANGE UP (ref 2.1–4.9)
PLATELET # BLD AUTO: 110 K/UL — LOW (ref 130–400)
PMV BLD: 12.2 FL — HIGH (ref 7.4–10.4)
POTASSIUM SERPL-MCNC: 5.1 MMOL/L — HIGH (ref 3.5–5)
POTASSIUM SERPL-SCNC: 5.1 MMOL/L — HIGH (ref 3.5–5)
PROCALCITONIN SERPL-MCNC: 0.14 NG/ML — HIGH (ref 0.02–0.1)
PROT SERPL-MCNC: 5.9 G/DL — LOW (ref 6–8)
PROT UR-MCNC: NEGATIVE MG/DL — SIGNIFICANT CHANGE UP
RBC # BLD: 4.42 M/UL — LOW (ref 4.7–6.1)
RBC # FLD: 12.5 % — SIGNIFICANT CHANGE UP (ref 11.5–14.5)
RBC CASTS # UR COMP ASSIST: 0 /HPF — SIGNIFICANT CHANGE UP (ref 0–4)
SODIUM SERPL-SCNC: 138 MMOL/L — SIGNIFICANT CHANGE UP (ref 135–146)
SP GR SPEC: 1.01 — SIGNIFICANT CHANGE UP (ref 1–1.03)
SQUAMOUS # UR AUTO: 0 /HPF — SIGNIFICANT CHANGE UP (ref 0–5)
UROBILINOGEN FLD QL: 0.2 MG/DL — SIGNIFICANT CHANGE UP (ref 0.2–1)
WBC # BLD: 8.34 K/UL — SIGNIFICANT CHANGE UP (ref 4.8–10.8)
WBC # FLD AUTO: 8.34 K/UL — SIGNIFICANT CHANGE UP (ref 4.8–10.8)
WBC UR QL: 5 /HPF — SIGNIFICANT CHANGE UP (ref 0–5)

## 2024-01-29 PROCEDURE — 76770 US EXAM ABDO BACK WALL COMP: CPT | Mod: 26

## 2024-01-29 PROCEDURE — 99232 SBSQ HOSP IP/OBS MODERATE 35: CPT

## 2024-01-29 PROCEDURE — 71250 CT THORAX DX C-: CPT | Mod: 26

## 2024-01-29 RX ORDER — SODIUM ZIRCONIUM CYCLOSILICATE 10 G/10G
5 POWDER, FOR SUSPENSION ORAL ONCE
Refills: 0 | Status: COMPLETED | OUTPATIENT
Start: 2024-01-29 | End: 2024-01-29

## 2024-01-29 RX ORDER — SODIUM CHLORIDE 9 MG/ML
1000 INJECTION, SOLUTION INTRAVENOUS
Refills: 0 | Status: DISCONTINUED | OUTPATIENT
Start: 2024-01-29 | End: 2024-01-30

## 2024-01-29 RX ORDER — ONDANSETRON 8 MG/1
4 TABLET, FILM COATED ORAL EVERY 6 HOURS
Refills: 0 | Status: DISCONTINUED | OUTPATIENT
Start: 2024-01-29 | End: 2024-01-30

## 2024-01-29 RX ORDER — TAMSULOSIN HYDROCHLORIDE 0.4 MG/1
0.4 CAPSULE ORAL AT BEDTIME
Refills: 0 | Status: DISCONTINUED | OUTPATIENT
Start: 2024-01-29 | End: 2024-01-30

## 2024-01-29 RX ADMIN — SODIUM CHLORIDE 100 MILLILITER(S): 9 INJECTION, SOLUTION INTRAVENOUS at 09:53

## 2024-01-29 RX ADMIN — SIMVASTATIN 20 MILLIGRAM(S): 20 TABLET, FILM COATED ORAL at 23:22

## 2024-01-29 RX ADMIN — HEPARIN SODIUM 5000 UNIT(S): 5000 INJECTION INTRAVENOUS; SUBCUTANEOUS at 05:19

## 2024-01-29 RX ADMIN — SODIUM ZIRCONIUM CYCLOSILICATE 5 GRAM(S): 10 POWDER, FOR SUSPENSION ORAL at 09:54

## 2024-01-29 RX ADMIN — SODIUM CHLORIDE 75 MILLILITER(S): 9 INJECTION, SOLUTION INTRAVENOUS at 01:36

## 2024-01-29 RX ADMIN — AZITHROMYCIN 255 MILLIGRAM(S): 500 TABLET, FILM COATED ORAL at 18:22

## 2024-01-29 RX ADMIN — CEFTRIAXONE 100 MILLIGRAM(S): 500 INJECTION, POWDER, FOR SOLUTION INTRAMUSCULAR; INTRAVENOUS at 23:21

## 2024-01-29 RX ADMIN — TAMSULOSIN HYDROCHLORIDE 0.4 MILLIGRAM(S): 0.4 CAPSULE ORAL at 23:22

## 2024-01-29 RX ADMIN — HEPARIN SODIUM 5000 UNIT(S): 5000 INJECTION INTRAVENOUS; SUBCUTANEOUS at 18:21

## 2024-01-29 RX ADMIN — CEFTRIAXONE 100 MILLIGRAM(S): 500 INJECTION, POWDER, FOR SOLUTION INTRAMUSCULAR; INTRAVENOUS at 00:56

## 2024-01-29 RX ADMIN — Medication 81 MILLIGRAM(S): at 11:19

## 2024-01-29 RX ADMIN — GABAPENTIN 300 MILLIGRAM(S): 400 CAPSULE ORAL at 11:18

## 2024-01-29 RX ADMIN — CITALOPRAM 40 MILLIGRAM(S): 10 TABLET, FILM COATED ORAL at 11:18

## 2024-01-29 RX ADMIN — PANTOPRAZOLE SODIUM 40 MILLIGRAM(S): 20 TABLET, DELAYED RELEASE ORAL at 08:30

## 2024-01-29 RX ADMIN — ONDANSETRON 4 MILLIGRAM(S): 8 TABLET, FILM COATED ORAL at 14:27

## 2024-01-29 NOTE — PHYSICAL THERAPY INITIAL EVALUATION ADULT - GENERAL OBSERVATIONS, REHAB EVAL
[phone] Pt received and left semifowlers in bed, NAD, pt agreeable to PT session +SpO2, +IV,  #[id number]

## 2024-01-29 NOTE — PHYSICAL THERAPY INITIAL EVALUATION ADULT - ADDITIONAL COMMENTS
Pt lives in home with wife, no steps to enter, 1 level inside. Pt ambulates with rollator, about 1 block before fatiguing. Pt has home health aide 4.5 hrs/day, 5 days/week.

## 2024-01-29 NOTE — DISCHARGE NOTE NURSING/CASE MANAGEMENT/SOCIAL WORK - NSDCVIVACCINE_GEN_ALL_CORE_FT
Tdap; 06-Sep-2021 18:44; Drashana Diaz (RN); [employer]; [id number] (Exp. Date: 21-Jan-2023); IntraMuscular; Deltoid Left.; 0.5 milliLiter(s); VIS (VIS Published: 09-May-2013, VIS Presented: 06-Sep-2021);

## 2024-01-29 NOTE — DISCHARGE NOTE NURSING/CASE MANAGEMENT/SOCIAL WORK - PATIENT PORTAL LINK FT
You can access the FollowMyHealth Patient Portal offered by [facility] by registering at the following website: [email or web address]. By joining [facility]’s FollowMyHealth portal, you will also be able to view your health information using other applications (apps) compatible with our system.

## 2024-01-29 NOTE — PROGRESS NOTE ADULT - SUBJECTIVE AND OBJECTIVE BOX
MEDICINE ATTENDING PROGRESS NOTE  HPI:  Patient is an 78-year-old non smoker male past medical history of hypertension, hyperlipidemia, CAD, PVD on aspirin 81 mg daily presenting for evaluation of generalized weakness, difficulty ambulating since yesterday.  He feels dizzy / unsteady. Did not fall / lose consciousness Patient's wife at bedside states he has had fever this morning and cough. No sob / chest pain. Since arriving to the ED, he had 6 episodes of watery non-bloody diarrhea, He denies nausea/ vomiting / abdominal pain / decreased appetite. No recent antibiotic use. Wife reports fever of "100 point something" at home.     In ED: BP: 110/53, HR: 88, RR: 17, temp: 98.6, spo2: 97% on 6L NC  Labs significant for WBC: 16.5K, creatinine 1.9 (baseline 1), K: 5.9  CXR: right sided opacities     Given lokelma, fluids, admitted to medicine (28 Jan 2024 17:36)      Interval/Overnight Events      ROS  General: Denies fevers, chills, nightsweats, weight loss  HEENT: Denies headache, rhinorrhea, sore throat, eye pain  CV: Denies CP, palpitations  PULM: Denies SOB, cough  GI: Denies abdominal pain, diarrhea  : Denies dysuria, hematuria  MSK: Denies arthralgias  SKIN: Denies rash   NEURO: Denies paresthesias, weakness  PSYCH: Denies depression    MEDICATIONS  (STANDING):  aspirin enteric coated 81 milliGRAM(s) Oral daily  azithromycin  IVPB 500 milliGRAM(s) IV Intermittent every 24 hours  azithromycin  IVPB      cefTRIAXone   IVPB 1000 milliGRAM(s) IV Intermittent every 24 hours  citalopram 40 milliGRAM(s) Oral daily  gabapentin 300 milliGRAM(s) Oral daily  heparin   Injectable 5000 Unit(s) SubCutaneous every 12 hours  lactated ringers. 1000 milliLiter(s) (100 mL/Hr) IV Continuous <Continuous>  pantoprazole    Tablet 40 milliGRAM(s) Oral before breakfast  simvastatin 20 milliGRAM(s) Oral at bedtime  tamsulosin 0.4 milliGRAM(s) Oral at bedtime    MEDICATIONS  (PRN):    ANTIBIOTICS:  azithromycin  IVPB 500 milliGRAM(s) IV Intermittent every 24 hours  azithromycin  IVPB      cefTRIAXone   IVPB 1000 milliGRAM(s) IV Intermittent every 24 hours      VITALS:  T(F): 97.6, Max: 100.9 (01-28-24 @ 15:00)  HR: 61  BP: 111/55  RR: 18Vital Signs Last 24 Hrs  T(C): 36.4 (29 Jan 2024 07:34), Max: 38.3 (28 Jan 2024 15:00)  T(F): 97.6 (29 Jan 2024 07:34), Max: 100.9 (28 Jan 2024 15:00)  HR: 61 (29 Jan 2024 09:59) (61 - 65)  BP: 111/55 (29 Jan 2024 09:59) (89/50 - 111/55)  BP(mean): 79 (29 Jan 2024 09:59) (65 - 79)  RR: 18 (29 Jan 2024 07:34) (18 - 18)  SpO2: 95% (29 Jan 2024 07:34) (95% - 95%)    Parameters below as of 29 Jan 2024 07:34  Patient On (Oxygen Delivery Method): nasal cannula  O2 Flow (L/min): 2   I&O's Summary    PHYSICAL EXAM:  Gen: NAD, resting in bed  HEENT: Normocephalic, atraumatic  Neck: supple, no lymphadenopathy  CV: Regular rate & regular rhythm  Lungs: CTABL no wheeze  Abdomen: Soft, NTND+ BS present  Ext: Warm, well perfused no CCE  Neuro: non focal, awake, CN II-XII intact   Skin: no rash, no erythema  Psych: no SI, HI, Hallucination     LABS:                        13.6   8.34  )-----------( 110      ( 29 Jan 2024 06:30 )             43.7     01-29    138  |  101  |  47<H>  ----------------------------<  93  5.1<H>   |  29  |  1.7<H>    Ca    8.6      29 Jan 2024 06:30  Phos  3.3     01-29  Mg     2.8     01-29    TPro  5.9<L>  /  Alb  3.7  /  TBili  0.8  /  DBili  x   /  AST  15  /  ALT  7   /  AlkPhos  93  01-29      LIVER FUNCTIONS - ( 29 Jan 2024 06:30 )  Alb: 3.7 g/dL / Pro: 5.9 g/dL / ALK PHOS: 93 U/L / ALT: 7 U/L / AST: 15 U/L / GGT: x           PT/INR - ( 28 Jan 2024 15:20 )   PT: 11.80 sec;   INR: 1.03 ratio         PTT - ( 28 Jan 2024 15:20 )  PTT:30.9 sec    MICROBIOLOGY:  Urinalysis Basic - ( 29 Jan 2024 06:30 )    Color: x / Appearance: x / SG: x / pH: x  Gluc: 93 mg/dL / Ketone: x  / Bili: x / Urobili: x   Blood: x / Protein: x / Nitrite: x   Leuk Esterase: x / RBC: x / WBC x   Sq Epi: x / Non Sq Epi: x / Bacteria: x          Lactate, Blood: 0.8 mmol/L (01-29-24 @ 06:30)  Lactate, Blood: 2.0 mmol/L (01-28-24 @ 21:30)        IMAGING:  CXR  Xray Chest 1 View- PORTABLE-Urgent:   ACC: [id number] EXAM:  XR CHEST PORTABLE URGENT 1V   ORDERED BY: VANNA SEAY     PROCEDURE DATE:  01/28/2024          INTERPRETATION:  Clinical History / Reason for exam: Sepsis    Comparison : Chest radiograph 10/5/2023.    Technique/Positioning: Frontal view.    Findings:    Support devices: Left-sided pacemaker with dual chamber leads as well as   CABG.    Cardiac/mediastinum/hilum: Magnified cardiac silhouette.    Lung parenchyma/Pleura: Right mid and upper lung zone opacities. No   pneumothorax.    Skeleton/soft tissues: Degenerative changes of the thoracic spine.    Impression:    Right-sided opacities. Follow-up to resolution is advised.        --- End of Report ---            MIKIE POOL MD; Attending Radiologist  This documenthas been electronically signed. Jan 28 2024  5:29PM (01-28-24 @ 14:43)      CT  CT Abdomen and Pelvis No Cont:   ACC: [id number] EXAM:  CT ABDOMEN AND PELVIS   ORDERED BY: JACLYN KING     *** ADDENDUM # 1 ***    Nonspecific coarse calcifications in the mesentery, possibly calcified   lymph nodes or granulomas, benign in appearance.    --- End of Report ---    *** END OF ADDENDUM # 1 ***      PROCEDURE DATE:  01/28/2024          INTERPRETATION:  CLINICAL STATEMENT: Diarrhea and fever    TECHNIQUE: Contiguous axial CT images were obtained from the lower chest   to the pubic symphysis without intravenous contrast. Oral contrast was   not administered.  Reformatted images in the coronal and sagittal planes   were acquired.    COMPARISON CT: None.    OTHER STUDIES USED FOR CORRELATION: None.      FINDINGS:  Limited evaluation of solid organs and vascularstructures secondary to   lack of intravenous contrast.    LOWER CHEST: Bibasilar patchy groundglass opacities and additional   scattered nodular opacities in the left lower lobe. Partially imaged   pacemaker leads. Median sternotomy and CABG..    HEPATOBILIARY: Cholelithiasis. Unremarkable liver.    SPLEEN: Unremarkable.    PANCREAS: Diffuse fatty atrophy.    ADRENAL GLANDS: Right adrenal gland 2 cm and left adrenal gland 1.8 cm   lipid rich adenomas.    KIDNEYS: Bilateral renal cysts and additional subcentimeter   hypodensities, too small to characterize. No hydronephrosis or   nephroureteral calculi.    ABDOMINOPELVIC NODES: No lymphadenopathy.    PELVIC ORGANS: Unremarkable.    PERITONEUM/MESENTERY/BOWEL: No bowel obstruction, ascites or   pneumoperitoneum. Post gastric bypass surgery. Pericecal surgical clips,   possibly post appendectomy. No intra-abdominal fluid collections.    BONES/SOFT TISSUES: Degenerative changes of the spine and hips noted.    OTHER: Atherosclerotic vascular calcifications. IVC filter in place.      IMPRESSION:  1.  No definite CT evidence of an acute abdominopelvic allergy.  2.  Cholelithiasis.  3.  Bibasilar patchy groundglass opacities and nodular left lower lobe   opacities, compatible with a multifocal infectious process.    --- End of Report ---    ***Please see the addendum at the top of this report. It may contain   additional important information or changes.****        STEFANIE PEDROZA MD; Attending Radiologist  This document has been electronically signed. Jan 29 2024  8:56AM  1st Addendum: STEFANIE PEDROZA MD; Attending Radiologist  The first addendum was electronically signed on: Jan 29 2024  9:47AM. (01-28-24 @ 19:45)    CARDIOLOGY TESTING  QRS axis to [] ° and NSR at a rate of [] BPM. There was no atrial enlargement. There was no ventricular hypertrophy. There were no ST-T changes and all intervals were normal.    12 Lead ECG:   Ventricular Rate 62 BPM    Atrial Rate 62 BPM    P-R Interval 132 ms    QRS Duration 170 ms    Q-T Interval 462 ms    QTC Calculation(Bazett) 468 ms    P Axis 39 degrees    R Axis -78 degrees    T Axis 91 degrees    Diagnosis Line Atrial-sensed ventricular-paced rhythm  Abnormal ECG    Confirmed by Flaquita Knutson MD ([id number]) on 1/29/2024 8:32:35 AM (01-28-24 @ 16:34)      ASSESSMENT/PLAN:  78-year-old non smoker male past medical history of hypertension, hyperlipidemia, CAD, PVD on aspirin 81 mg daily presenting for evaluation of generalized weakness, difficulty ambulating since yesterday.     Acute hypoxic respiratory failure 2/2 Community acquired pneumonia   Sepsis on admission Likely Secondary to Pneumonia   Diarrhea x 6 episodes  Titrate supplemental O2 to maintain SpO2 92-96%;  Avoid hyperoxia and wean off O2 gradually   f/u   ABG   - possible  Component of deconditioning   - check cultures, stool c.diff & GI PCR, UA   - influenza/covid negative  - continue IVF   - CXR Right sided opacities   -  ceftriaxone / azithro   - wean oxygen as tolerated (now at 3L)  - check CT abdomen/pelvis  - check lactate at 8pm    Hx  fo Fall   Decreased ambulation   1. Apparent nondisplaced fractures in the first metatarsal head and base   of the first distal phalanx, correlate with point tenderness.  2. Diffuse osteopenia without additional acute osseous abnormality.  3. Subcutaneous hemorrhage along the dorsal forefoot.  PT rehab       BOBBY   Hyperkalemia   Hx BBH   Baseline creatinine: 1.0 Creatinine today 1.9, Consider Renal US,  Avoid nephrotoxic agents, Monitor BUN/creatinine, Send  Urine Lytes, S/p Lokelma, Hold Lasix and Lisinopril   Renal Dose  medications   c/w Flowmax   Repeat BMP     Hx CAD s/p CABG and PCI.   Hx HLD, PVD   Continue ASA, and Statin.    HTN: hold  Lasix and Lisinopril in the setting of BOBBY    #Supportive Management:  Dispo:   DVT Ppx: heparin   Injectable 5000 Unit(s) SubCutaneous every 12 hours  GI Ppx: pantoprazole    Tablet 40 milliGRAM(s) Oral before breakfast  Diet:  Diet, DASH/TLC:   Sodium & Cholesterol Restricted (01-28-24 @ 18:22) [Active]      Total time spent to complete patient's bedside assessment, review medical chart, discuss medical plan of care with covering medical team was more than 45 minutes with >50% of time spent face to face with patient, discussion with patient/family and/or coordination of care    Housestaff's notes reviewed. When there is confusion regarding the content or information provided in the notes of a housestaff (resident, medical student, physician assistant, or nurse practioner) and the attending physician notes, the attending physician's note takes precedence and supersedes the other notes.    Cesar Harding MD/Nathan  Attending Physician MEDICINE ATTENDING PROGRESS NOTE  78-year-old non smoker male past medical history of hypertension, hyperlipidemia, CAD, PVD on aspirin 81 mg daily presenting for evaluation of generalized weakness, difficulty ambulating. Admitted for further management.    Interval/Overnight Events  - No acute complaints  - BP is low, but no reflex tachycardia, not on beta blocker -> will check am cortisol given adrenal nodules finding on imaging. Pt has been on steroid previously.  - Septic shock/Dehydration will cause low BP, but with reflex tachycardia  - Cont IV abx    ROS  General: Denies fevers, chills, nightsweats, weight loss  HEENT: Denies headache, rhinorrhea, sore throat, eye pain  CV: Denies CP, palpitations  PULM: Denies SOB, cough  GI: Denies abdominal pain, diarrhea  : Denies dysuria, hematuria  MSK: Denies arthralgias  SKIN: Denies rash   NEURO: Denies paresthesias, weakness  PSYCH: Denies depression    MEDICATIONS  (STANDING):  aspirin enteric coated 81 milliGRAM(s) Oral daily  azithromycin  IVPB 500 milliGRAM(s) IV Intermittent every 24 hours  azithromycin  IVPB      cefTRIAXone   IVPB 1000 milliGRAM(s) IV Intermittent every 24 hours  citalopram 40 milliGRAM(s) Oral daily  gabapentin 300 milliGRAM(s) Oral daily  heparin   Injectable 5000 Unit(s) SubCutaneous every 12 hours  lactated ringers. 1000 milliLiter(s) (100 mL/Hr) IV Continuous <Continuous>  pantoprazole    Tablet 40 milliGRAM(s) Oral before breakfast  simvastatin 20 milliGRAM(s) Oral at bedtime  tamsulosin 0.4 milliGRAM(s) Oral at bedtime    MEDICATIONS  (PRN):    ANTIBIOTICS:  azithromycin  IVPB 500 milliGRAM(s) IV Intermittent every 24 hours  azithromycin  IVPB      cefTRIAXone   IVPB 1000 milliGRAM(s) IV Intermittent every 24 hours    VITALS:  T(F): 97.6, Max: 100.9 (01-28-24 @ 15:00)  HR: 61  BP: 111/55  RR: 18Vital Signs Last 24 Hrs  T(C): 36.4 (29 Jan 2024 07:34), Max: 38.3 (28 Jan 2024 15:00)  T(F): 97.6 (29 Jan 2024 07:34), Max: 100.9 (28 Jan 2024 15:00)  HR: 61 (29 Jan 2024 09:59) (61 - 65)  BP: 111/55 (29 Jan 2024 09:59) (89/50 - 111/55)  BP(mean): 79 (29 Jan 2024 09:59) (65 - 79)  RR: 18 (29 Jan 2024 07:34) (18 - 18)  SpO2: 95% (29 Jan 2024 07:34) (95% - 95%)    Parameters below as of 29 Jan 2024 07:34  Patient On (Oxygen Delivery Method): nasal cannula  O2 Flow (L/min): 2   I&O's Summary    PHYSICAL EXAM:  Gen: NAD, resting in bed  HEENT: Normocephalic, atraumatic  Neck: supple, no lymphadenopathy  CV: Regular rate & regular rhythm  Lungs: CTABL no wheeze  Abdomen: Soft, NTND+ BS present  Ext: Warm, well perfused no CCE  Neuro: non focal, awake, CN II-XII intact   Skin: no rash, no erythema  Psych: no SI, HI, Hallucination     LABS:                        13.6   8.34  )-----------( 110      ( 29 Jan 2024 06:30 )             43.7     01-29    138  |  101  |  47<H>  ----------------------------<  93  5.1<H>   |  29  |  1.7<H>    Ca    8.6      29 Jan 2024 06:30  Phos  3.3     01-29  Mg     2.8     01-29    TPro  5.9<L>  /  Alb  3.7  /  TBili  0.8  /  DBili  x   /  AST  15  /  ALT  7   /  AlkPhos  93  01-29    LIVER FUNCTIONS - ( 29 Jan 2024 06:30 )  Alb: 3.7 g/dL / Pro: 5.9 g/dL / ALK PHOS: 93 U/L / ALT: 7 U/L / AST: 15 U/L / GGT: x           PT/INR - ( 28 Jan 2024 15:20 )   PT: 11.80 sec;   INR: 1.03 ratio    PTT - ( 28 Jan 2024 15:20 )  PTT:30.9 sec    MICROBIOLOGY:  Urinalysis Basic - ( 29 Jan 2024 06:30 )  Color: x / Appearance: x / SG: x / pH: x  Gluc: 93 mg/dL / Ketone: x  / Bili: x / Urobili: x   Blood: x / Protein: x / Nitrite: x   Leuk Esterase: x / RBC: x / WBC x   Sq Epi: x / Non Sq Epi: x / Bacteria: x    Lactate, Blood: 0.8 mmol/L (01-29-24 @ 06:30)  Lactate, Blood: 2.0 mmol/L (01-28-24 @ 21:30)    IMAGING:  Xray Chest 1 View- PORTABLE-Urgent:   Findings:  Support devices: Left-sided pacemaker with dual chamber leads as well as CABG.  Cardiac/mediastinum/hilum: Magnified cardiac silhouette.  Lung parenchyma/Pleura: Right mid and upper lung zone opacities. No   pneumothorax.  Skeleton/soft tissues: Degenerative changes of the thoracic spine.  Impression:  Right-sided opacities. Follow-up to resolution is advised.    CT Abdomen and Pelvis No Cont:   *** ADDENDUM # 1 ***  Nonspecific coarse calcifications in the mesentery, possibly calcified   lymph nodes or granulomas, benign in appearance.    FINDINGS:  Limited evaluation of solid organs and vascularstructures secondary to   lack of intravenous contrast.    LOWER CHEST: Bibasilar patchy groundglass opacities and additional   scattered nodular opacities in the left lower lobe. Partially imaged   pacemaker leads. Median sternotomy and CABG..    HEPATOBILIARY: Cholelithiasis. Unremarkable liver.    SPLEEN: Unremarkable.    PANCREAS: Diffuse fatty atrophy.    ADRENAL GLANDS: Right adrenal gland 2 cm and left adrenal gland 1.8 cm   lipid rich adenomas.    KIDNEYS: Bilateral renal cysts and additional subcentimeter   hypodensities, too small to characterize. No hydronephrosis or   nephroureteral calculi.    ABDOMINOPELVIC NODES: No lymphadenopathy.    PELVIC ORGANS: Unremarkable.    PERITONEUM/MESENTERY/BOWEL: No bowel obstruction, ascites or   pneumoperitoneum. Post gastric bypass surgery. Pericecal surgical clips,   possibly post appendectomy. No intra-abdominal fluid collections.    BONES/SOFT TISSUES: Degenerative changes of the spine and hips noted.    OTHER: Atherosclerotic vascular calcifications. IVC filter in place.      IMPRESSION:  1.  No definite CT evidence of an acute abdominopelvic allergy.  2.  Cholelithiasis.  3.  Bibasilar patchy groundglass opacities and nodular left lower lobe   opacities, compatible with a multifocal infectious process.    CARDIOLOGY TESTING  12 Lead ECG:   Ventricular Rate 62 BPM  Atrial Rate 62 BPM  P-R Interval 132 ms  QRS Duration 170 ms  Q-T Interval 462 ms  QTC Calculation(Bazett) 468 ms  P Axis 39 degrees  R Axis -78 degrees  T Axis 91 degrees    Diagnosis Line Atrial-sensed ventricular-paced rhythm  Abnormal ECG    Confirmed by Flaquita Knutson MD ([id number]) on 1/29/2024 8:32:35 AM (01-28-24 @ 16:34)    ASSESSMENT  78-year-old non smoker male past medical history of hypertension, hyperlipidemia, CAD, PVD on aspirin 81 mg daily presenting for evaluation of generalized weakness, difficulty ambulating. Admitted for further management.    IMPRESSION  - Acute hypoxic respiratory failure 2/2 Community acquired pneumonia   - Sepsis on admission Likely Secondary to Pneumonia   - Low BP with normal HR in setting of diarrhea and sepsis, r/o adrenal insufficiency given adrenal nodules found on imaging  - Diarrhea   Incidental findings::  1. Right adrenal gland 2 cm and left adrenal gland 1.8 cm 2. Nonspecific coarse calcifications in the mesentery, possibly calcified   lymph nodes or granulomas  - Hx  fo Fall   - Decreased ambulation   - BOBBY (on IVF)  - Hyperkalemia s/p lokelma  - Hx BBH   - Hx CAD s/p CABG and PCI.   - Hx HLD, PVD     PLAN  - f/u infectious work up; f/u am cortisol  - f/u CT abdomen/pelvis  - c/w IVF for diarrhea, IV abx, Oxygen supplementation  - Resume home meds for chronic conditions except as above holding  Lasix and Lisinopril in the setting of BOBBY  - outpatient follow up for incidental findings.  - cont current management except as above  - PT for dispo    #Supportive Management:  Dispo:   DVT Ppx: heparin   Injectable 5000 Unit(s) SubCutaneous every 12 hours  GI Ppx: pantoprazole    Tablet 40 milliGRAM(s) Oral before breakfast  Diet:  Diet, DASH/TLC:   Sodium & Cholesterol Restricted (01-28-24 @ 18:22) [Active]      Total time spent to complete patient's bedside assessment, review medical chart, discuss medical plan of care with covering medical team was more than 45 minutes with >50% of time spent face to face with patient, discussion with patient/family and/or coordination of care    Housestaff's notes reviewed. When there is confusion regarding the content or information provided in the notes of a housestaff (resident, medical student, physician assistant, or nurse practioner) and the attending physician notes, the attending physician's note takes precedence and supersedes the other notes.    Cesar Harding MD/Nathan  Attending Physician

## 2024-01-29 NOTE — DISCHARGE NOTE NURSING/CASE MANAGEMENT/SOCIAL WORK - NSDCPEFALRISK_GEN_ALL_CORE
For information on Fall & Injury Prevention, visit: [email or web address] OR  [email or web address] OR  https://www.cdc.gov/steadi/patient.html

## 2024-01-29 NOTE — ED ADULT NURSE REASSESSMENT NOTE - NS ED NURSE REASSESS COMMENT FT1
New redness noted on pt sacrum due to repeated cleaning of loose bowel movements. MD Cummings notified of skin change. No new orders

## 2024-01-30 ENCOUNTER — TRANSCRIPTION ENCOUNTER (OUTPATIENT)
Age: 79
End: 2024-01-30

## 2024-01-30 VITALS
HEART RATE: 60 BPM | TEMPERATURE: 98 F | SYSTOLIC BLOOD PRESSURE: 138 MMHG | RESPIRATION RATE: 18 BRPM | OXYGEN SATURATION: 93 % | DIASTOLIC BLOOD PRESSURE: 61 MMHG

## 2024-01-30 LAB
ALBUMIN SERPL ELPH-MCNC: 3.7 G/DL — SIGNIFICANT CHANGE UP (ref 3.5–5.2)
ALP SERPL-CCNC: 92 U/L — SIGNIFICANT CHANGE UP (ref 30–115)
ALT FLD-CCNC: 8 U/L — SIGNIFICANT CHANGE UP (ref 0–41)
ANION GAP SERPL CALC-SCNC: 8 MMOL/L — SIGNIFICANT CHANGE UP (ref 7–14)
AST SERPL-CCNC: 13 U/L — SIGNIFICANT CHANGE UP (ref 0–41)
BASOPHILS # BLD AUTO: 0.03 K/UL — SIGNIFICANT CHANGE UP (ref 0–0.2)
BASOPHILS NFR BLD AUTO: 0.3 % — SIGNIFICANT CHANGE UP (ref 0–1)
BILIRUB SERPL-MCNC: 0.6 MG/DL — SIGNIFICANT CHANGE UP (ref 0.2–1.2)
BUN SERPL-MCNC: 31 MG/DL — HIGH (ref 10–20)
CALCIUM SERPL-MCNC: 8.9 MG/DL — SIGNIFICANT CHANGE UP (ref 8.4–10.5)
CHLORIDE SERPL-SCNC: 105 MMOL/L — SIGNIFICANT CHANGE UP (ref 98–110)
CO2 SERPL-SCNC: 28 MMOL/L — SIGNIFICANT CHANGE UP (ref 17–32)
CREAT SERPL-MCNC: 1.2 MG/DL — SIGNIFICANT CHANGE UP (ref 0.7–1.5)
CULTURE RESULTS: SIGNIFICANT CHANGE UP
EGFR: 61 ML/MIN/1.73M2 — SIGNIFICANT CHANGE UP
EOSINOPHIL # BLD AUTO: 0.11 K/UL — SIGNIFICANT CHANGE UP (ref 0–0.7)
EOSINOPHIL NFR BLD AUTO: 0.9 % — SIGNIFICANT CHANGE UP (ref 0–8)
GLUCOSE SERPL-MCNC: 101 MG/DL — HIGH (ref 70–99)
HCT VFR BLD CALC: 43.5 % — SIGNIFICANT CHANGE UP (ref 42–52)
HGB BLD-MCNC: 13.8 G/DL — LOW (ref 14–18)
IMM GRANULOCYTES NFR BLD AUTO: 0.5 % — HIGH (ref 0.1–0.3)
LEGIONELLA AG UR QL: NEGATIVE — SIGNIFICANT CHANGE UP
LYMPHOCYTES # BLD AUTO: 1.03 K/UL — LOW (ref 1.2–3.4)
LYMPHOCYTES # BLD AUTO: 8.8 % — LOW (ref 20.5–51.1)
MAGNESIUM SERPL-MCNC: 2.6 MG/DL — HIGH (ref 1.8–2.4)
MCHC RBC-ENTMCNC: 30.9 PG — SIGNIFICANT CHANGE UP (ref 27–31)
MCHC RBC-ENTMCNC: 31.7 G/DL — LOW (ref 32–37)
MCV RBC AUTO: 97.3 FL — HIGH (ref 80–94)
MONOCYTES # BLD AUTO: 0.85 K/UL — HIGH (ref 0.1–0.6)
MONOCYTES NFR BLD AUTO: 7.2 % — SIGNIFICANT CHANGE UP (ref 1.7–9.3)
NEUTROPHILS # BLD AUTO: 9.66 K/UL — HIGH (ref 1.4–6.5)
NEUTROPHILS NFR BLD AUTO: 82.3 % — HIGH (ref 42.2–75.2)
NRBC # BLD: 0 /100 WBCS — SIGNIFICANT CHANGE UP (ref 0–0)
PLATELET # BLD AUTO: 144 K/UL — SIGNIFICANT CHANGE UP (ref 130–400)
PMV BLD: 12.5 FL — HIGH (ref 7.4–10.4)
POTASSIUM SERPL-MCNC: 4.7 MMOL/L — SIGNIFICANT CHANGE UP (ref 3.5–5)
POTASSIUM SERPL-SCNC: 4.7 MMOL/L — SIGNIFICANT CHANGE UP (ref 3.5–5)
PROT SERPL-MCNC: 6.1 G/DL — SIGNIFICANT CHANGE UP (ref 6–8)
RBC # BLD: 4.47 M/UL — LOW (ref 4.7–6.1)
RBC # FLD: 12.2 % — SIGNIFICANT CHANGE UP (ref 11.5–14.5)
S PNEUM AG UR QL: NEGATIVE — SIGNIFICANT CHANGE UP
SODIUM SERPL-SCNC: 141 MMOL/L — SIGNIFICANT CHANGE UP (ref 135–146)
SPECIMEN SOURCE: SIGNIFICANT CHANGE UP
WBC # BLD: 11.74 K/UL — HIGH (ref 4.8–10.8)
WBC # FLD AUTO: 11.74 K/UL — HIGH (ref 4.8–10.8)

## 2024-01-30 PROCEDURE — 99239 HOSP IP/OBS DSCHRG MGMT >30: CPT

## 2024-01-30 RX ORDER — AZITHROMYCIN 500 MG/1
1 TABLET, FILM COATED ORAL
Qty: 3 | Refills: 0
Start: 2024-01-30

## 2024-01-30 RX ORDER — GABAPENTIN 400 MG/1
1 CAPSULE ORAL
Refills: 0 | DISCHARGE

## 2024-01-30 RX ORDER — GABAPENTIN 400 MG/1
1 CAPSULE ORAL
Qty: 0 | Refills: 0 | DISCHARGE
Start: 2024-01-30

## 2024-01-30 RX ORDER — CEFPODOXIME PROXETIL 100 MG
1 TABLET ORAL
Qty: 8 | Refills: 0
Start: 2024-01-30 | End: 2024-02-02

## 2024-01-30 RX ORDER — AZITHROMYCIN 500 MG/1
1 TABLET, FILM COATED ORAL
Qty: 4 | Refills: 0
Start: 2024-01-30 | End: 2024-02-02

## 2024-01-30 RX ORDER — GABAPENTIN 400 MG/1
2 CAPSULE ORAL
Refills: 0 | DISCHARGE

## 2024-01-30 RX ADMIN — GABAPENTIN 300 MILLIGRAM(S): 400 CAPSULE ORAL at 12:25

## 2024-01-30 RX ADMIN — Medication 30 MILLILITER(S): at 12:25

## 2024-01-30 RX ADMIN — PANTOPRAZOLE SODIUM 40 MILLIGRAM(S): 20 TABLET, DELAYED RELEASE ORAL at 08:27

## 2024-01-30 RX ADMIN — HEPARIN SODIUM 5000 UNIT(S): 5000 INJECTION INTRAVENOUS; SUBCUTANEOUS at 05:56

## 2024-01-30 RX ADMIN — Medication 81 MILLIGRAM(S): at 12:25

## 2024-01-30 RX ADMIN — CITALOPRAM 40 MILLIGRAM(S): 10 TABLET, FILM COATED ORAL at 12:25

## 2024-01-30 NOTE — DISCHARGE NOTE PROVIDER - ATTENDING DISCHARGE PHYSICAL EXAMINATION:
Fasting labs     rx for nasal spray to use for allergies    Return in November for physical exam Attending attestation  Attending DC note  Pt seen and examined at bedside. No cp or sob. Family refused home care, antibiotics po   vitals, labs, exam stable  Hospital course as above.  Plan dw pt and agreed to plan  Medically cleared for DC. Med recc completed.  POL[facility] resident. Spent 32 mins on case

## 2024-01-30 NOTE — DISCHARGE NOTE PROVIDER - NSDCMRMEDTOKEN_GEN_ALL_CORE_FT
Aspir 81 oral delayed release tablet: 1 tab(s) orally once a day  citalopram 40 mg oral tablet: 1 tab(s) orally once a day  furosemide 40 mg oral tablet: 1 tab(s) orally once a day  gabapentin 300 mg oral capsule: 1 cap(s) orally 2 times a day morning and evening  gabapentin 300 mg oral capsule: 2 cap(s) orally once a day (at bedtime)  lisinopril 10 mg oral tablet: 1 tab(s) orally once a day  pantoprazole 40 mg oral delayed release tablet: 1 tab(s) orally once a day  simvastatin 20 mg oral tablet: 1 tab(s) orally once a day (at bedtime)  tamsulosin 0.4 mg oral capsule: 1 cap(s) orally once a day   Aspir 81 oral delayed release tablet: 1 tab(s) orally once a day  azithromycin 500 mg oral tablet: 1 tab(s) orally once a day  cefpodoxime 200 mg oral tablet: 1 tab(s) orally 2 times a day  citalopram 40 mg oral tablet: 1 tab(s) orally once a day  furosemide 40 mg oral tablet: 1 tab(s) orally once a day  gabapentin 300 mg oral capsule: 1 cap(s) orally once a day  lisinopril 10 mg oral tablet: 1 tab(s) orally once a day  pantoprazole 40 mg oral delayed release tablet: 1 tab(s) orally once a day  simvastatin 20 mg oral tablet: 1 tab(s) orally once a day (at bedtime)  tamsulosin 0.4 mg oral capsule: 1 cap(s) orally once a day

## 2024-01-30 NOTE — DISCHARGE NOTE PROVIDER - CARE PROVIDER_API CALL
Shan Saavedra .  Internal Medicine  [address] [place] [address]  Phone: [phone]  Fax: [phone]  Follow Up Time: 2 weeks

## 2024-01-30 NOTE — DISCHARGE NOTE PROVIDER - NSDCCPCAREPLAN_GEN_ALL_CORE_FT
PRINCIPAL DISCHARGE DIAGNOSIS  Diagnosis: Weakness  Assessment and Plan of Treatment: You were admitted to the hospital for weakness and difficulty walking. You had imaging that demonstrated pneumonia. You were treated with IV antibioitcs and will be discharged with a short course of oral antibioitcs. Please take all medications exactly as prescribed. You worked with physical therapy while inpatient who recommended rehab, but you decided that you wanted to go home. Please follow up with your PCP in one week.   Get help right away if:  You have sudden weakness.  You have trouble breathing or shortness of breath.  You have problems with your vision.  You have trouble talking or swallowing.  You have trouble standing or walking.  You have chest pain.  You are light-headed.  You pass out (lose consciousness).  This information is not intended to replace advice given to you by your health care provider. Make sure you discuss any questions you have with your health care provider.

## 2024-01-31 LAB — CORTIS AM PEAK SERPL-MCNC: 23 UG/DL — HIGH (ref 6–18.4)

## 2024-02-02 LAB
CULTURE RESULTS: SIGNIFICANT CHANGE UP
CULTURE RESULTS: SIGNIFICANT CHANGE UP
SPECIMEN SOURCE: SIGNIFICANT CHANGE UP
SPECIMEN SOURCE: SIGNIFICANT CHANGE UP

## 2024-02-05 DIAGNOSIS — Z79.82 LONG TERM (CURRENT) USE OF ASPIRIN: ICD-10-CM

## 2024-02-05 DIAGNOSIS — J96.01 ACUTE RESPIRATORY FAILURE WITH HYPOXIA: ICD-10-CM

## 2024-02-05 DIAGNOSIS — Z95.1 PRESENCE OF AORTOCORONARY BYPASS GRAFT: ICD-10-CM

## 2024-02-05 DIAGNOSIS — E87.5 HYPERKALEMIA: ICD-10-CM

## 2024-02-05 DIAGNOSIS — Z86.718 PERSONAL HISTORY OF OTHER VENOUS THROMBOSIS AND EMBOLISM: ICD-10-CM

## 2024-02-05 DIAGNOSIS — I73.9 PERIPHERAL VASCULAR DISEASE, UNSPECIFIED: ICD-10-CM

## 2024-02-05 DIAGNOSIS — R19.7 DIARRHEA, UNSPECIFIED: ICD-10-CM

## 2024-02-05 DIAGNOSIS — I25.10 ATHEROSCLEROTIC HEART DISEASE OF NATIVE CORONARY ARTERY WITHOUT ANGINA PECTORIS: ICD-10-CM
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
## 2024-02-05 DIAGNOSIS — S92.315A NONDISPLACED FRACTURE OF FIRST METATARSAL BONE, LEFT FOOT, INITIAL ENCOUNTER FOR CLOSED FRACTURE: ICD-10-CM

## 2024-02-05 DIAGNOSIS — N40.0 BENIGN PROSTATIC HYPERPLASIA WITHOUT LOWER URINARY TRACT SYMPTOMS: ICD-10-CM

## 2024-02-05 DIAGNOSIS — Z95.0 PRESENCE OF CARDIAC PACEMAKER: ICD-10-CM

## 2024-02-05 DIAGNOSIS — R53.1 WEAKNESS: ICD-10-CM
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
## 2024-02-05 DIAGNOSIS — J18.9 PNEUMONIA, UNSPECIFIED ORGANISM: ICD-10-CM

## 2024-02-05 DIAGNOSIS — A41.9 SEPSIS, UNSPECIFIED ORGANISM: ICD-10-CM

## 2024-02-05 DIAGNOSIS — I10 ESSENTIAL (PRIMARY) HYPERTENSION: ICD-10-CM

## 2024-02-05 DIAGNOSIS — N17.9 ACUTE KIDNEY FAILURE, UNSPECIFIED: ICD-10-CM

## 2024-05-10 ENCOUNTER — APPOINTMENT (OUTPATIENT)
Dept: ELECTROPHYSIOLOGY | Facility: CLINIC | Age: 79
End: 2024-05-10
Payer: MEDICARE

## 2024-05-10 VITALS
RESPIRATION RATE: 17 BRPM | HEART RATE: 84 BPM | BODY MASS INDEX: 44.71 KG/M2 | SYSTOLIC BLOOD PRESSURE: 118 MMHG | HEIGHT: 68 IN | TEMPERATURE: 97.6 F | WEIGHT: 295 LBS | DIASTOLIC BLOOD PRESSURE: 71 MMHG

## 2024-05-10 DIAGNOSIS — I44.2 ATRIOVENTRICULAR BLOCK, COMPLETE: ICD-10-CM

## 2024-05-10 DIAGNOSIS — Z45.018 ENCOUNTER FOR ADJUSTMENT AND MANAGEMENT OF OTHER PART OF CARDIAC PACEMAKER: ICD-10-CM

## 2024-05-10 DIAGNOSIS — Z86.79 PERSONAL HISTORY OF OTHER DISEASES OF THE CIRCULATORY SYSTEM: ICD-10-CM

## 2024-05-10 PROCEDURE — 99213 OFFICE O/P EST LOW 20 MIN: CPT

## 2024-05-10 PROCEDURE — 93280 PM DEVICE PROGR EVAL DUAL: CPT

## 2024-05-10 RX ORDER — TAMSULOSIN HYDROCHLORIDE 0.4 MG/1
0.4 CAPSULE ORAL
Qty: 90 | Refills: 3 | Status: ACTIVE | COMMUNITY
Start: 2020-07-27

## 2024-05-10 RX ORDER — TRAMADOL HYDROCHLORIDE 50 MG/1
50 TABLET, COATED ORAL
Qty: 60 | Refills: 0 | Status: ACTIVE | COMMUNITY
Start: 2023-08-09

## 2024-05-10 RX ORDER — DICLOFENAC SODIUM 1 MG/ML
0.1 SOLUTION OPHTHALMIC
Qty: 5 | Refills: 0 | Status: ACTIVE | COMMUNITY
Start: 2023-10-09

## 2024-05-10 RX ORDER — FUROSEMIDE 40 MG/1
40 TABLET ORAL DAILY
Qty: 90 | Refills: 2 | Status: ACTIVE | COMMUNITY
Start: 2020-08-04

## 2024-05-10 RX ORDER — CETIRIZINE HYDROCHLORIDE 10 MG/1
10 TABLET, COATED ORAL
Qty: 14 | Refills: 0 | Status: ACTIVE | COMMUNITY
Start: 2023-09-30

## 2024-05-10 RX ORDER — CLOTRIMAZOLE AND BETAMETHASONE DIPROPIONATE 10; .5 MG/G; MG/G
1-0.05 CREAM TOPICAL
Qty: 60 | Refills: 0 | Status: ACTIVE | COMMUNITY
Start: 2023-09-11

## 2024-05-10 RX ORDER — FLUTICASONE PROPIONATE 50 UG/1
50 SPRAY, METERED NASAL
Qty: 16 | Refills: 0 | Status: ACTIVE | COMMUNITY
Start: 2023-09-30

## 2024-05-10 RX ORDER — CITALOPRAM HYDROBROMIDE 20 MG/1
20 TABLET, FILM COATED ORAL DAILY
Refills: 0 | Status: ACTIVE | COMMUNITY

## 2024-05-10 RX ORDER — NYSTATIN 100000 U/G
100000 OINTMENT TOPICAL
Qty: 30 | Refills: 0 | Status: ACTIVE | COMMUNITY
Start: 2022-11-07

## 2024-05-10 RX ORDER — PANTOPRAZOLE 40 MG/1
40 TABLET, DELAYED RELEASE ORAL
Qty: 90 | Refills: 0 | Status: ACTIVE | COMMUNITY
Start: 2022-10-19

## 2024-05-10 RX ORDER — PROMETHAZINE HYDROCHLORIDE 6.25 MG/5ML
6.25 SOLUTION ORAL
Qty: 400 | Refills: 0 | Status: ACTIVE | COMMUNITY
Start: 2023-09-30

## 2024-05-10 RX ORDER — PREDNISOLONE ACETATE 10 MG/ML
1 SUSPENSION/ DROPS OPHTHALMIC
Qty: 5 | Refills: 0 | Status: ACTIVE | COMMUNITY
Start: 2023-10-09

## 2024-05-10 RX ORDER — ASPIRIN 81 MG
81 TABLET, DELAYED RELEASE (ENTERIC COATED) ORAL DAILY
Refills: 0 | Status: ACTIVE | COMMUNITY

## 2024-05-10 RX ORDER — MOXIFLOXACIN OPHTHALMIC 5 MG/ML
0.5 SOLUTION/ DROPS OPHTHALMIC
Qty: 3 | Refills: 0 | Status: ACTIVE | COMMUNITY
Start: 2023-10-02

## 2024-05-10 NOTE — PHYSICAL EXAM
[General Appearance - Well Developed] : well developed [Heart Rate And Rhythm] : heart rate and rhythm were normal [Heart Sounds] : normal S1 and S2 [Edema] : no peripheral edema present [] : no respiratory distress [Left Infraclavicular] : left infraclavicular area [Clean] : clean [Dry] : dry [Well-Healed] : well-healed [Abdomen Soft] : soft [Normal Appearance] : normal appearance [Well Groomed] : well groomed [General Appearance - Well Nourished] : well nourished [No Deformities] : no deformities [General Appearance - In No Acute Distress] : no acute distress [FreeTextEntry1] : uses rollator [Respiration, Rhythm And Depth] : normal respiratory rhythm and effort [Exaggerated Use Of Accessory Muscles For Inspiration] : no accessory muscle use [Nail Clubbing] : no clubbing of the fingernails [Cyanosis, Localized] : no localized cyanosis

## 2024-05-10 NOTE — CARDIOLOGY SUMMARY
[de-identified] : 11/28/2022 79 bpm a paced, V paced [de-identified] : 09/28/2022: PPM gen change\par  10/31/2014: PPM implanted

## 2024-05-10 NOTE — REASON FOR VISIT
[Follow-up Device Check] : is here today for a follow-up device check visit for [Spouse] : spouse [Interpreters_IDNumber] : [id number] [Interpreters_FullName] : Ramon [[email or web address]] : [place]

## 2024-05-10 NOTE — ASSESSMENT
[FreeTextEntry1] : # Sinus node dysfunction, permanent AV Block s/p DC-PPM  - Device interrogation normal. I interrogated and reprogrammed the device as described above.  - No events. - If V threshold stable, consider lowering outputs NOV.  - The patient is on remote and transmitting.   # HTN - BP well controlled. - low sodium/sat fat diet - Weight loss/exercise as tolerated encouraged.  I have also advised the patient to go to the nearest emergency room if he experiences any chest pain, dyspnea, syncope, or has any other compelling symptoms.   F/U 9-12mo / PRN

## 2024-05-10 NOTE — PROCEDURE
[See Device Printout] : See device printout [Pacemaker] : pacemaker [DDDR] : DDDR [Voltage: ___ volts] : Voltage was [unfilled] volts [Threshold Testing Performed] : Threshold testing was performed [Lead Imp:  ___ohms] : lead impedance was [unfilled] ohms [Sensing Amplitude ___mv] : sensing amplitude was [unfilled] mv [___V @] : [unfilled] V [___ ms] : [unfilled] ms [None] : none [Programmed for Longevity] : output reprogrammed for improved battery longevity [A-V Junctional Rhythm] : A-V junctional rhythm [de-identified] : Medtronic Criselda XT DR GAINES [de-identified] : [id number] [de-identified] : [id number] [de-identified] : 9/28/2022 [de-identified] : 60/130 [de-identified] : 4.5 YEARS [de-identified] :  100% AP 99.9% No events

## 2024-05-10 NOTE — HISTORY OF PRESENT ILLNESS
[Erythema at Site] : no erythema at device site [de-identified] : 80 y/o male with a PMHx of CAD, MI, S/P CABG, S/P PTCA/stent of RCA, HTN, CVA, DM, CHB s/p PPM, s/p pacemaker generator changed 09/28/2022.  The patient is feeling well and has no cardiac complaints. Denies CP, palpitations, SOB, dizziness, DOMINGUEZ, PND, syncope.   Cardiologist: Dr. Price

## 2024-05-14 ENCOUNTER — APPOINTMENT (OUTPATIENT)
Dept: CARDIOLOGY | Facility: CLINIC | Age: 79
End: 2024-05-14

## 2024-05-29 ENCOUNTER — APPOINTMENT (OUTPATIENT)
Dept: ORTHOPEDIC SURGERY | Facility: CLINIC | Age: 79
End: 2024-05-29
Payer: MEDICARE

## 2024-05-29 DIAGNOSIS — M17.12 UNILATERAL PRIMARY OSTEOARTHRITIS, LEFT KNEE: ICD-10-CM

## 2024-05-29 DIAGNOSIS — M17.11 UNILATERAL PRIMARY OSTEOARTHRITIS, RIGHT KNEE: ICD-10-CM

## 2024-05-29 DIAGNOSIS — G56.03 CARPAL TUNNEL SYNDROM,BILATERAL UPPER LIMBS: ICD-10-CM

## 2024-05-29 PROCEDURE — 99213 OFFICE O/P EST LOW 20 MIN: CPT

## 2024-05-29 PROCEDURE — G2211 COMPLEX E/M VISIT ADD ON: CPT

## 2024-05-29 RX ORDER — GABAPENTIN 300 MG/1
300 CAPSULE ORAL TWICE DAILY
Qty: 60 | Refills: 2 | Status: ACTIVE | COMMUNITY
Start: 2024-05-29 | End: 1900-01-01

## 2024-05-29 NOTE — HISTORY OF PRESENT ILLNESS
[de-identified] : Patient Complaint - left knee pain has been stable flared up  few months ago  has put on weight still has a quad cane\par  mostly for his stroke gel injection was back in October 2020\par  History of Present Illness\par  77-year-old gentleman for follow-up of his left knee pain longstanding issues some pain in the right the right had gel\par  injection 4 years ago which helped recently been having some pain again does have a cane for multiple medical\par  comorbidities he did have bariatric surgery when his weight was up to touch 400 currently 295 he has also had open\par  heart surgery and circulatory issues in his legs.  is current medical doctor \par  he has hypertension diabetes and hypothyroidism does take baby aspirin did have a gel injection 2  years ago which\par  gave him some relief does use a knee sleeve has lost 10 lb is using a cane\par   since last visit the right knee has been giving him a lot more pain as well

## 2024-05-29 NOTE — ASSESSMENT
[FreeTextEntry1] : will get auth for gel injections  continue WL   night splints start romelia   emphasized the importance to try to get some weight off. 3 month f/u

## 2024-05-29 NOTE — REASON FOR VISIT
[Family Member] : family member [FreeTextEntry2] : bilat knee pain bilateral hand numbness   just back from [place] lost 10 pounds  has a cane  hands wake him up at night cortisone injections didn't help much

## 2024-05-29 NOTE — IMAGING
[de-identified] :  swelling left and right knee limited motion crepitus antalgic gait venous stasis changes in leg some edema no open wounds or ulcers does have a knee sleeve Left foot swelling diffuse tenderness ecchymosis both hands  + tinel and Phalens

## 2024-08-09 ENCOUNTER — APPOINTMENT (OUTPATIENT)
Dept: CARDIOLOGY | Facility: CLINIC | Age: 79
End: 2024-08-09

## 2024-08-09 PROCEDURE — 93296 REM INTERROG EVL PM/IDS: CPT

## 2024-08-09 PROCEDURE — 93294 REM INTERROG EVL PM/LDLS PM: CPT

## 2024-08-29 ENCOUNTER — APPOINTMENT (OUTPATIENT)
Dept: ORTHOPEDIC SURGERY | Facility: CLINIC | Age: 79
End: 2024-08-29

## 2024-08-29 DIAGNOSIS — E66.01 MORBID (SEVERE) OBESITY DUE TO EXCESS CALORIES: ICD-10-CM

## 2024-08-29 DIAGNOSIS — M17.12 UNILATERAL PRIMARY OSTEOARTHRITIS, LEFT KNEE: ICD-10-CM

## 2024-08-29 DIAGNOSIS — G56.03 CARPAL TUNNEL SYNDROM,BILATERAL UPPER LIMBS: ICD-10-CM

## 2024-08-29 DIAGNOSIS — M17.11 UNILATERAL PRIMARY OSTEOARTHRITIS, RIGHT KNEE: ICD-10-CM

## 2024-08-29 PROCEDURE — 20610 DRAIN/INJ JOINT/BURSA W/O US: CPT | Mod: 50

## 2024-08-29 PROCEDURE — 99214 OFFICE O/P EST MOD 30 MIN: CPT | Mod: 25

## 2024-08-29 NOTE — IMAGING
[de-identified] :  swelling left and right knee limited motion crepitus antalgic gait venous stasis changes in leg some edema no open wounds or ulcers does have a knee sleeve Left foot swelling diffuse tenderness ecchymosis both hands  + tinel and Phalens Left knee Allen's cyst on sonogram here with wheelchair increasingly difficult getting around

## 2024-08-29 NOTE — IMAGING
[de-identified] :  swelling left and right knee limited motion crepitus antalgic gait venous stasis changes in leg some edema no open wounds or ulcers does have a knee sleeve Left foot swelling diffuse tenderness ecchymosis both hands  + tinel and Phalens Left knee Allen's cyst on sonogram here with wheelchair increasingly difficult getting around

## 2024-08-29 NOTE — ASSESSMENT
[FreeTextEntry1] : No intervention for the Baker's cyst tried to explain this several times prepared to do gel injection both knees I  discussed with patient today the  option of a  Visco supplement injection to the knees.  risks and benefits were  reviewed a  consent was  given,  with sterile technique through a lateral approach the gel injections 48 mg Synvisc-one L and R were delivered and  tolerated well. a Band-Aid was applied I again attempted to explain my concern that without substantive weight reduction for which there has not been I am running out of any treatment options I will see him back in December before they go to [place] Had a long conversation today with Dr. Perdomo who agreed with the treatment plan   emphasized the importance to try to get some weight off. 3 month f/u

## 2024-08-29 NOTE — REASON FOR VISIT
[Spouse] : spouse [Formal Caregiver] : formal caregiver [FreeTextEntry2] : bilat knee pain bilateral hand numbness bl; Synvisc one gel injection still markedly overweight in a wheelchair did see vascular surgeon Dr. Mack did Doppler said he had a left Baker's cyst has a new PMD Dr. Perdomo [phone]

## 2024-08-29 NOTE — HISTORY OF PRESENT ILLNESS
[de-identified] : Patient Complaint - left knee pain has been stable flared up  few months ago  has put on weight still has a quad cane\par  mostly for his stroke gel injection was back in October 2020\par  History of Present Illness\par  77-year-old gentleman for follow-up of his left knee pain longstanding issues some pain in the right the right had gel\par  injection 4 years ago which helped recently been having some pain again does have a cane for multiple medical\par  comorbidities he did have bariatric surgery when his weight was up to touch 400 currently 295 he has also had open\par  heart surgery and circulatory issues in his legs.  is current medical doctor \par  he has hypertension diabetes and hypothyroidism does take baby aspirin did have a gel injection 2  years ago which\par  gave him some relief does use a knee sleeve has lost 10 lb is using a cane\par   since last visit the right knee has been giving him a lot more pain as well

## 2024-08-29 NOTE — REASON FOR VISIT
[Spouse] : spouse [Formal Caregiver] : formal caregiver [FreeTextEntry2] : bilat knee pain bilateral hand numbness bl; Synvisc one gel injection still markedly overweight in a wheelchair did see vascular surgeon Dr. Mack did Doppler said he had a left Baker's cyst has a new PMD Dr. Perdomo [phone]

## 2024-08-29 NOTE — HISTORY OF PRESENT ILLNESS
[de-identified] : Patient Complaint - left knee pain has been stable flared up  few months ago  has put on weight still has a quad cane\par  mostly for his stroke gel injection was back in October 2020\par  History of Present Illness\par  77-year-old gentleman for follow-up of his left knee pain longstanding issues some pain in the right the right had gel\par  injection 4 years ago which helped recently been having some pain again does have a cane for multiple medical\par  comorbidities he did have bariatric surgery when his weight was up to touch 400 currently 295 he has also had open\par  heart surgery and circulatory issues in his legs.  is current medical doctor \par  he has hypertension diabetes and hypothyroidism does take baby aspirin did have a gel injection 2  years ago which\par  gave him some relief does use a knee sleeve has lost 10 lb is using a cane\par   since last visit the right knee has been giving him a lot more pain as well

## 2024-09-16 ENCOUNTER — APPOINTMENT (OUTPATIENT)
Dept: CARDIOLOGY | Facility: CLINIC | Age: 79
End: 2024-09-16

## 2024-10-07 ENCOUNTER — NON-APPOINTMENT (OUTPATIENT)
Age: 79
End: 2024-10-07

## 2024-10-24 ENCOUNTER — APPOINTMENT (OUTPATIENT)
Dept: CARDIOLOGY | Facility: CLINIC | Age: 79
End: 2024-10-24
Payer: MEDICARE

## 2024-10-24 VITALS
DIASTOLIC BLOOD PRESSURE: 90 MMHG | WEIGHT: 283 LBS | HEART RATE: 73 BPM | SYSTOLIC BLOOD PRESSURE: 138 MMHG | HEIGHT: 68 IN | BODY MASS INDEX: 42.89 KG/M2

## 2024-10-24 DIAGNOSIS — Z86.79 PERSONAL HISTORY OF OTHER DISEASES OF THE CIRCULATORY SYSTEM: ICD-10-CM

## 2024-10-24 DIAGNOSIS — I25.10 ATHEROSCLEROTIC HEART DISEASE OF NATIVE CORONARY ARTERY W/OUT ANGINA PECTORIS: ICD-10-CM

## 2024-10-24 DIAGNOSIS — I44.2 ATRIOVENTRICULAR BLOCK, COMPLETE: ICD-10-CM

## 2024-10-24 DIAGNOSIS — Z95.1 PRESENCE OF AORTOCORONARY BYPASS GRAFT: ICD-10-CM

## 2024-10-24 PROCEDURE — 99214 OFFICE O/P EST MOD 30 MIN: CPT | Mod: 25

## 2024-10-24 PROCEDURE — 93000 ELECTROCARDIOGRAM COMPLETE: CPT

## 2024-11-08 ENCOUNTER — APPOINTMENT (OUTPATIENT)
Dept: CARDIOLOGY | Facility: CLINIC | Age: 79
End: 2024-11-08

## 2024-11-08 PROCEDURE — 93294 REM INTERROG EVL PM/LDLS PM: CPT

## 2024-11-08 PROCEDURE — 93296 REM INTERROG EVL PM/IDS: CPT

## 2024-12-19 ENCOUNTER — APPOINTMENT (OUTPATIENT)
Dept: ORTHOPEDIC SURGERY | Facility: CLINIC | Age: 79
End: 2024-12-19
Payer: MEDICARE

## 2024-12-19 DIAGNOSIS — M17.12 UNILATERAL PRIMARY OSTEOARTHRITIS, LEFT KNEE: ICD-10-CM

## 2024-12-19 DIAGNOSIS — M17.11 UNILATERAL PRIMARY OSTEOARTHRITIS, RIGHT KNEE: ICD-10-CM

## 2024-12-19 PROCEDURE — 99213 OFFICE O/P EST LOW 20 MIN: CPT | Mod: 25

## 2024-12-19 PROCEDURE — 20610 DRAIN/INJ JOINT/BURSA W/O US: CPT | Mod: RT

## 2024-12-26 ENCOUNTER — INPATIENT (INPATIENT)
Facility: HOSPITAL | Age: 79
LOS: 3 days | Discharge: HOME | DRG: 871 | End: 2024-12-30
Attending: HOSPITALIST | Admitting: INTERNAL MEDICINE
Payer: MEDICARE

## 2024-12-26 VITALS
RESPIRATION RATE: 19 BRPM | OXYGEN SATURATION: 91 % | SYSTOLIC BLOOD PRESSURE: 93 MMHG | HEART RATE: 100 BPM | TEMPERATURE: 98 F | DIASTOLIC BLOOD PRESSURE: 45 MMHG

## 2024-12-26 DIAGNOSIS — Z95.1 PRESENCE OF AORTOCORONARY BYPASS GRAFT: Chronic | ICD-10-CM

## 2024-12-26 DIAGNOSIS — Z98.890 OTHER SPECIFIED POSTPROCEDURAL STATES: Chronic | ICD-10-CM

## 2024-12-26 DIAGNOSIS — Z95.0 PRESENCE OF CARDIAC PACEMAKER: Chronic | ICD-10-CM

## 2024-12-26 DIAGNOSIS — Z98.84 BARIATRIC SURGERY STATUS: Chronic | ICD-10-CM

## 2024-12-26 DIAGNOSIS — N17.9 ACUTE KIDNEY FAILURE, UNSPECIFIED: ICD-10-CM

## 2024-12-26 LAB
ALBUMIN SERPL ELPH-MCNC: 3.6 G/DL — SIGNIFICANT CHANGE UP (ref 3.5–5.2)
ALP SERPL-CCNC: 117 U/L — HIGH (ref 30–115)
ALT FLD-CCNC: 7 U/L — SIGNIFICANT CHANGE UP (ref 0–41)
ANION GAP SERPL CALC-SCNC: 11 MMOL/L — SIGNIFICANT CHANGE UP (ref 7–14)
ANION GAP SERPL CALC-SCNC: 12 MMOL/L — SIGNIFICANT CHANGE UP (ref 7–14)
APPEARANCE UR: ABNORMAL
APTT BLD: 29.5 SEC — SIGNIFICANT CHANGE UP (ref 27–39.2)
AST SERPL-CCNC: 15 U/L — SIGNIFICANT CHANGE UP (ref 0–41)
BASOPHILS # BLD AUTO: 0.05 K/UL — SIGNIFICANT CHANGE UP (ref 0–0.2)
BASOPHILS NFR BLD AUTO: 0.3 % — SIGNIFICANT CHANGE UP (ref 0–1)
BILIRUB SERPL-MCNC: 1.2 MG/DL — SIGNIFICANT CHANGE UP (ref 0.2–1.2)
BILIRUB UR-MCNC: NEGATIVE — SIGNIFICANT CHANGE UP
BUN SERPL-MCNC: 39 MG/DL — HIGH (ref 10–20)
BUN SERPL-MCNC: 41 MG/DL — HIGH (ref 10–20)
CALCIUM SERPL-MCNC: 8 MG/DL — LOW (ref 8.4–10.4)
CALCIUM SERPL-MCNC: 8.2 MG/DL — LOW (ref 8.4–10.4)
CHLORIDE SERPL-SCNC: 97 MMOL/L — LOW (ref 98–110)
CHLORIDE SERPL-SCNC: 97 MMOL/L — LOW (ref 98–110)
CO2 SERPL-SCNC: 24 MMOL/L — SIGNIFICANT CHANGE UP (ref 17–32)
CO2 SERPL-SCNC: 24 MMOL/L — SIGNIFICANT CHANGE UP (ref 17–32)
COLOR SPEC: YELLOW — SIGNIFICANT CHANGE UP
CREAT SERPL-MCNC: 2.1 MG/DL — HIGH (ref 0.7–1.5)
CREAT SERPL-MCNC: 2.3 MG/DL — HIGH (ref 0.7–1.5)
DIFF PNL FLD: NEGATIVE — SIGNIFICANT CHANGE UP
EGFR: 28 ML/MIN/1.73M2 — LOW
EGFR: 31 ML/MIN/1.73M2 — LOW
EOSINOPHIL # BLD AUTO: 0.02 K/UL — SIGNIFICANT CHANGE UP (ref 0–0.7)
EOSINOPHIL NFR BLD AUTO: 0.1 % — SIGNIFICANT CHANGE UP (ref 0–8)
GAS PNL BLDV: SIGNIFICANT CHANGE UP
GAS PNL BLDV: SIGNIFICANT CHANGE UP
GLUCOSE BLDC GLUCOMTR-MCNC: 160 MG/DL — HIGH (ref 70–99)
GLUCOSE SERPL-MCNC: 117 MG/DL — HIGH (ref 70–99)
GLUCOSE SERPL-MCNC: 140 MG/DL — HIGH (ref 70–99)
GLUCOSE UR QL: NEGATIVE MG/DL — SIGNIFICANT CHANGE UP
HCT VFR BLD CALC: 39.8 % — LOW (ref 42–52)
HGB BLD-MCNC: 12.2 G/DL — LOW (ref 14–18)
IMM GRANULOCYTES NFR BLD AUTO: 0.7 % — HIGH (ref 0.1–0.3)
INR BLD: 1.15 RATIO — SIGNIFICANT CHANGE UP (ref 0.65–1.3)
KETONES UR-MCNC: NEGATIVE MG/DL — SIGNIFICANT CHANGE UP
LEUKOCYTE ESTERASE UR-ACNC: ABNORMAL
LYMPHOCYTES # BLD AUTO: 0.67 K/UL — LOW (ref 1.2–3.4)
LYMPHOCYTES # BLD AUTO: 3.5 % — LOW (ref 20.5–51.1)
MCHC RBC-ENTMCNC: 28.9 PG — SIGNIFICANT CHANGE UP (ref 27–31)
MCHC RBC-ENTMCNC: 30.7 G/DL — LOW (ref 32–37)
MCV RBC AUTO: 94.3 FL — HIGH (ref 80–94)
MONOCYTES # BLD AUTO: 0.92 K/UL — HIGH (ref 0.1–0.6)
MONOCYTES NFR BLD AUTO: 4.8 % — SIGNIFICANT CHANGE UP (ref 1.7–9.3)
MRSA PCR RESULT.: NEGATIVE — SIGNIFICANT CHANGE UP
NEUTROPHILS # BLD AUTO: 17.54 K/UL — HIGH (ref 1.4–6.5)
NEUTROPHILS NFR BLD AUTO: 90.6 % — HIGH (ref 42.2–75.2)
NITRITE UR-MCNC: NEGATIVE — SIGNIFICANT CHANGE UP
NRBC # BLD: 0 /100 WBCS — SIGNIFICANT CHANGE UP (ref 0–0)
NT-PROBNP SERPL-SCNC: 3344 PG/ML — HIGH (ref 0–300)
PH UR: 5 — SIGNIFICANT CHANGE UP (ref 5–8)
PLATELET # BLD AUTO: 139 K/UL — SIGNIFICANT CHANGE UP (ref 130–400)
PMV BLD: 12.4 FL — HIGH (ref 7.4–10.4)
POTASSIUM SERPL-MCNC: 5.1 MMOL/L — HIGH (ref 3.5–5)
POTASSIUM SERPL-MCNC: 5.8 MMOL/L — HIGH (ref 3.5–5)
POTASSIUM SERPL-SCNC: 5.1 MMOL/L — HIGH (ref 3.5–5)
POTASSIUM SERPL-SCNC: 5.8 MMOL/L — HIGH (ref 3.5–5)
PROT SERPL-MCNC: 6.2 G/DL — SIGNIFICANT CHANGE UP (ref 6–8)
PROT UR-MCNC: 30 MG/DL
PROTHROM AB SERPL-ACNC: 13.6 SEC — HIGH (ref 9.95–12.87)
RBC # BLD: 4.22 M/UL — LOW (ref 4.7–6.1)
RBC # FLD: 14.1 % — SIGNIFICANT CHANGE UP (ref 11.5–14.5)
SODIUM SERPL-SCNC: 132 MMOL/L — LOW (ref 135–146)
SODIUM SERPL-SCNC: 133 MMOL/L — LOW (ref 135–146)
SP GR SPEC: >1.03 — HIGH (ref 1–1.03)
TROPONIN T, HIGH SENSITIVITY RESULT: 112 NG/L — CRITICAL HIGH (ref 6–21)
TROPONIN T, HIGH SENSITIVITY RESULT: 149 NG/L — CRITICAL HIGH (ref 6–21)
TROPONIN T, HIGH SENSITIVITY RESULT: 83 NG/L — CRITICAL HIGH (ref 6–21)
UROBILINOGEN FLD QL: 1 MG/DL — SIGNIFICANT CHANGE UP (ref 0.2–1)
WBC # BLD: 19.34 K/UL — HIGH (ref 4.8–10.8)
WBC # FLD AUTO: 19.34 K/UL — HIGH (ref 4.8–10.8)

## 2024-12-26 PROCEDURE — 82962 GLUCOSE BLOOD TEST: CPT

## 2024-12-26 PROCEDURE — 76770 US EXAM ABDO BACK WALL COMP: CPT

## 2024-12-26 PROCEDURE — 86850 RBC ANTIBODY SCREEN: CPT

## 2024-12-26 PROCEDURE — 85027 COMPLETE CBC AUTOMATED: CPT

## 2024-12-26 PROCEDURE — 84145 PROCALCITONIN (PCT): CPT

## 2024-12-26 PROCEDURE — 93306 TTE W/DOPPLER COMPLETE: CPT

## 2024-12-26 PROCEDURE — 80053 COMPREHEN METABOLIC PANEL: CPT

## 2024-12-26 PROCEDURE — 84484 ASSAY OF TROPONIN QUANT: CPT

## 2024-12-26 PROCEDURE — 83735 ASSAY OF MAGNESIUM: CPT

## 2024-12-26 PROCEDURE — 0042T: CPT | Mod: MC

## 2024-12-26 PROCEDURE — 87641 MR-STAPH DNA AMP PROBE: CPT

## 2024-12-26 PROCEDURE — 87640 STAPH A DNA AMP PROBE: CPT

## 2024-12-26 PROCEDURE — 85025 COMPLETE CBC W/AUTO DIFF WBC: CPT

## 2024-12-26 PROCEDURE — 99222 1ST HOSP IP/OBS MODERATE 55: CPT

## 2024-12-26 PROCEDURE — 86901 BLOOD TYPING SEROLOGIC RH(D): CPT

## 2024-12-26 PROCEDURE — 93005 ELECTROCARDIOGRAM TRACING: CPT

## 2024-12-26 PROCEDURE — 84100 ASSAY OF PHOSPHORUS: CPT

## 2024-12-26 PROCEDURE — 71275 CT ANGIOGRAPHY CHEST: CPT | Mod: 26,MC

## 2024-12-26 PROCEDURE — 70498 CT ANGIOGRAPHY NECK: CPT | Mod: 26,MC

## 2024-12-26 PROCEDURE — 70450 CT HEAD/BRAIN W/O DYE: CPT | Mod: MC

## 2024-12-26 PROCEDURE — 86900 BLOOD TYPING SEROLOGIC ABO: CPT

## 2024-12-26 PROCEDURE — 70496 CT ANGIOGRAPHY HEAD: CPT | Mod: 26,MC

## 2024-12-26 PROCEDURE — 99285 EMERGENCY DEPT VISIT HI MDM: CPT

## 2024-12-26 PROCEDURE — 99223 1ST HOSP IP/OBS HIGH 75: CPT

## 2024-12-26 PROCEDURE — 93308 TTE F-UP OR LMTD: CPT | Mod: 26

## 2024-12-26 PROCEDURE — 71045 X-RAY EXAM CHEST 1 VIEW: CPT | Mod: 26

## 2024-12-26 PROCEDURE — 70450 CT HEAD/BRAIN W/O DYE: CPT | Mod: 26,MC,XU

## 2024-12-26 PROCEDURE — 80048 BASIC METABOLIC PNL TOTAL CA: CPT

## 2024-12-26 PROCEDURE — 36415 COLL VENOUS BLD VENIPUNCTURE: CPT

## 2024-12-26 RX ORDER — VANCOMYCIN HYDROCHLORIDE 5 G/100ML
1000 INJECTION, POWDER, LYOPHILIZED, FOR SOLUTION INTRAVENOUS ONCE
Refills: 0 | Status: COMPLETED | OUTPATIENT
Start: 2024-12-26 | End: 2024-12-26

## 2024-12-26 RX ORDER — BUPROPION HYDROCHLORIDE 150 MG/1
1 TABLET, EXTENDED RELEASE ORAL
Refills: 0 | DISCHARGE

## 2024-12-26 RX ORDER — ATORVASTATIN CALCIUM 40 MG/1
10 TABLET, FILM COATED ORAL AT BEDTIME
Refills: 0 | Status: DISCONTINUED | OUTPATIENT
Start: 2024-12-26 | End: 2024-12-30

## 2024-12-26 RX ORDER — ASPIRIN 81 MG
81 TABLET, DELAYED RELEASE (ENTERIC COATED) ORAL DAILY
Refills: 0 | Status: DISCONTINUED | OUTPATIENT
Start: 2024-12-26 | End: 2024-12-30

## 2024-12-26 RX ORDER — FUROSEMIDE 20 MG
80 TABLET ORAL
Refills: 0 | Status: DISCONTINUED | OUTPATIENT
Start: 2024-12-26 | End: 2024-12-29

## 2024-12-26 RX ORDER — BUPROPION HYDROCHLORIDE 150 MG/1
150 TABLET, EXTENDED RELEASE ORAL DAILY
Refills: 0 | Status: DISCONTINUED | OUTPATIENT
Start: 2024-12-26 | End: 2024-12-30

## 2024-12-26 RX ORDER — HEPARIN SODIUM 1000 [USP'U]/ML
5000 INJECTION, SOLUTION INTRAVENOUS; SUBCUTANEOUS EVERY 12 HOURS
Refills: 0 | Status: DISCONTINUED | OUTPATIENT
Start: 2024-12-26 | End: 2024-12-30

## 2024-12-26 RX ORDER — SODIUM ZIRCONIUM CYCLOSILICATE 10 G/10G
10 POWDER, FOR SUSPENSION ORAL ONCE
Refills: 0 | Status: COMPLETED | OUTPATIENT
Start: 2024-12-26 | End: 2024-12-26

## 2024-12-26 RX ORDER — PANTOPRAZOLE 40 MG/1
40 TABLET, DELAYED RELEASE ORAL
Refills: 0 | Status: DISCONTINUED | OUTPATIENT
Start: 2024-12-26 | End: 2024-12-30

## 2024-12-26 RX ADMIN — Medication 100 MILLIGRAM(S): at 08:19

## 2024-12-26 RX ADMIN — SODIUM ZIRCONIUM CYCLOSILICATE 10 GRAM(S): 10 POWDER, FOR SUSPENSION ORAL at 10:58

## 2024-12-26 RX ADMIN — Medication 80 MILLIGRAM(S): at 15:29

## 2024-12-26 RX ADMIN — VANCOMYCIN HYDROCHLORIDE 250 MILLIGRAM(S): 5 INJECTION, POWDER, LYOPHILIZED, FOR SOLUTION INTRAVENOUS at 09:26

## 2024-12-26 RX ADMIN — Medication 100 MILLIGRAM(S): at 17:59

## 2024-12-26 RX ADMIN — HEPARIN SODIUM 5000 UNIT(S): 1000 INJECTION, SOLUTION INTRAVENOUS; SUBCUTANEOUS at 17:59

## 2024-12-26 RX ADMIN — ATORVASTATIN CALCIUM 10 MILLIGRAM(S): 40 TABLET, FILM COATED ORAL at 21:33

## 2024-12-26 NOTE — CONSULT NOTE ADULT - NS ATTEND AMEND GEN_ALL_CORE FT
Pt is a 78 yo M with PMHx of HTN, S/p CABG, HLD, DVT, s/p PPM, prior ischemic stroke, who presented with dysarthria and hypotension. No facial droop appreciated on my assessment. Mild dysarthria without aphasia. Leukocytosis, suspected UTI. CT head without acute process. CTA head/neck with approx 70% stenosis of b/l proximal ICA, no elvo. Reasonable to obtain MRI brain w/o contrast. Continue home ASA/statin, avoid hypotension, q4 neurochecks.

## 2024-12-26 NOTE — ED PROVIDER NOTE - ADMIT DISPOSITION PRESENT ON ADMISSION SEPSIS Q1 - RE-EVALUATED PATIENT FLUID AND VITAL SIGNS
Asa  Statin  Primarily WC bound.  PTcompleted  Strict fall precautions.  Monitor   I have re-evaluated the patient's fluid status and reviewed vital signs. Clinical perfusion assessment was performed.

## 2024-12-26 NOTE — CONSULT NOTE ADULT - SUBJECTIVE AND OBJECTIVE BOX
Outpt cardiologist:    HPI:  80 yo m with pmh of htn, hld, bph, cad/pad on ASA brought in by EMS for slurred speech. Patient also reported sob and leg swelling for the past months. Per EMS patient was found to be hypoxic to 88 improved with 2 L nasal cannula.  Patient denies any recent fevers cough chest pain nausea vomiting or abdominal pain. Stroke code called, imaging negative for acute stroke. On my encounter, patient denies any neurologic symptoms, back at baseline.     ED Vitals:  · BP Systolic  93 mm Hg  · BP Diastolic  45 mm Hg  · Heart Rate 100 /min  · Respiration Rate (breaths/min) 19 /min  · Temp (F) 98.2 Degrees F  · Temp (C) 36.8 Degrees C  · SpO2 (%)  91 % on 2L nc    Labs: WBC 19K, hGB 12.2, nA 133, k 5.8, bun 39, creat 2.3, gfr 28, trop 83, proBNP 3344  VBG: ph 7.29, lactate 1, c02 55, bicarb 26  UA positive, wbc 270, moderate LE    Xray chest: Mild pulmonary vascular congestion    Patient admitted for further management. (26 Dec 2024 11:37)      PAST MEDICAL & SURGICAL HISTORY  Pacemaker    Obesity    HTN (hypertension)    BPH (benign prostatic hyperplasia)    DVT (deep venous thrombosis)    Hypercholesterolemia    CAD (coronary artery disease)    CVA (cerebral vascular accident)    Pacemaker  2010    S/P gastric bypass      S/P primary angioplasty    S/P CABG (coronary artery bypass graft)        FAMILY HISTORY:  FAMILY HISTORY:      SOCIAL HISTORY:  Social History:      ALLERGIES:  No Known Allergies      MEDICATIONS:  aspirin enteric coated 81 milliGRAM(s) Oral daily  atorvastatin 10 milliGRAM(s) Oral at bedtime  buPROPion XL (24-Hour) . 150 milliGRAM(s) Oral daily  cefepime   IVPB 1000 milliGRAM(s) IV Intermittent every 12 hours  furosemide   Injectable 80 milliGRAM(s) IV Push two times a day  heparin   Injectable 5000 Unit(s) SubCutaneous every 12 hours  pantoprazole    Tablet 40 milliGRAM(s) Oral before breakfast    PRN:      HOME MEDICATIONS:  Home Medications:  Aspir 81 oral delayed release tablet: 1 tab(s) orally once a day (2024 18:11)  buPROPion 150 mg/24 hours (XL) oral tablet, extended release: 1 tab(s) orally once a day (26 Dec 2024 12:52)  furosemide 40 mg oral tablet: 1 tab(s) orally once a day (2024 18:11)  gabapentin 300 mg oral capsule: 1 cap(s) orally 3 times a day (26 Dec 2024 12:53)  lisinopril 10 mg oral tablet: 1 tab(s) orally once a day (2024 18:11)  pantoprazole 40 mg oral delayed release tablet: 1 tab(s) orally once a day (2024 18:11)  simvastatin 20 mg oral tablet: 1 tab(s) orally once a day (at bedtime) (2024 18:11)      VITALS:   T(F): 98.2 ( @ 07:59), Max: 98.2 ( @ 07:07)  HR: 60 ( @ 11:19) (59 - 100)  BP: 94/46 ( @ 11:19) (93/45 - 107/53)  BP(mean): 66 ( @ 11:19) (66 - 69)  RR: 19 ( @ 11:19) (19 - 19)  SpO2: 94% ( @ 11:19) (91% - 98%)    I&O's Summary      REVIEW OF SYSTEMS:  CONSTITUTIONAL: No weakness, fevers or chills  HEENT: No visual changes, neck/ear pain  RESPIRATORY: No cough, sob  CARDIOVASCULAR: See HPI  GASTROINTESTINAL: No abdominal pain. No nausea, vomiting, diarrhea   GENITOURINARY: No dysuria, frequency or hematuria  NEUROLOGICAL: No new focal deficits  SKIN: No new rashes    PHYSICAL EXAM:  General: Not in distress.  Non-toxic appearing.   HEENT: EOMI  Cardio: regular, S1, S2, no murmur  Pulm: B/L BS.  No wheezing / crackles / rales  Abdomen: Soft, non-tender, non-distended. Normoactive bowel sounds  Extremities: No edema b/l le  Neuro: A&O x3. No focal deficits    LABS:                        12.2   19.34 )-----------( 139      ( 26 Dec 2024 07:30 )             39.8         133[L]  |  97[L]  |  39[H]  ----------------------------<  140[H]  5.8[H]   |  24  |  2.3[H]    Ca    8.2[L]      26 Dec 2024 07:30    TPro  6.2  /  Alb  3.6  /  TBili  1.2  /  DBili  x   /  AST  15  /  ALT  7   /  AlkPhos  117[H]      PT/INR - ( 26 Dec 2024 07:30 )   PT: 13.60 sec;   INR: 1.15 ratio      PTT - ( 26 Dec 2024 07:30 )  PTT:29.5 sec      RADIOLOGY:  Xray Chest 1 View- PORTABLE-Urgent (24 @ 08:08) >  Low lung volume. Mild pulmonary vascular congestion.      EC Lead ECG:   Ventricular Rate 60 BPM    Atrial Rate 60 BPM    P-R Interval 176 ms    QRS Duration 172 ms    Q-T Interval 458 ms    QTC Calculation(Bazett) 458 ms    P Axis -77 degrees    R Axis -59 degrees    T Axis 98 degrees    Diagnosis Line AV dual-paced rhythm  Abnormal ECG    Confirmed by NATE SAMUELS, IRENE ([id number]) on 2024 10:57:29 AM ( @ 08:34)     Outpt cardiologist: Dr. Price    HPI:  78 yo m with pmh of htn, hld, bph, cad/pad on ASA brought in by EMS for slurred speech. Patient also reported sob and leg swelling for the past months. Per EMS patient was found to be hypoxic to 88 improved with 2 L nasal cannula.  Patient denies any recent fevers cough chest pain nausea vomiting or abdominal pain. Stroke code called, imaging negative for acute stroke. On my encounter, patient denies any neurologic symptoms, back at baseline.     Labs: WBC 19K, hGB 12.2, nA 133, k 5.8, bun 39, creat 2.3, gfr 28, trop 83, proBNP 3344  VBG: ph 7.29, lactate 1, c02 55, bicarb 26  UA positive, wbc 270, moderate LE    Xray chest: Mild pulmonary vascular congestion    Patient admitted for further management. (26 Dec 2024 11:37)      PAST MEDICAL & SURGICAL HISTORY  Pacemaker    Obesity    HTN (hypertension)    BPH (benign prostatic hyperplasia)    DVT (deep venous thrombosis)    Hypercholesterolemia    CAD (coronary artery disease)    CVA (cerebral vascular accident)    Pacemaker  2010    S/P gastric bypass      S/P primary angioplasty    S/P CABG (coronary artery bypass graft)        FAMILY HISTORY:  FAMILY HISTORY:      SOCIAL HISTORY:  Social History:      ALLERGIES:  No Known Allergies      MEDICATIONS:  aspirin enteric coated 81 milliGRAM(s) Oral daily  atorvastatin 10 milliGRAM(s) Oral at bedtime  buPROPion XL (24-Hour) . 150 milliGRAM(s) Oral daily  cefepime   IVPB 1000 milliGRAM(s) IV Intermittent every 12 hours  furosemide   Injectable 80 milliGRAM(s) IV Push two times a day  heparin   Injectable 5000 Unit(s) SubCutaneous every 12 hours  pantoprazole    Tablet 40 milliGRAM(s) Oral before breakfast    PRN:      HOME MEDICATIONS:  Home Medications:  Aspir 81 oral delayed release tablet: 1 tab(s) orally once a day (2024 18:11)  buPROPion 150 mg/24 hours (XL) oral tablet, extended release: 1 tab(s) orally once a day (26 Dec 2024 12:52)  furosemide 40 mg oral tablet: 1 tab(s) orally once a day (2024 18:11)  gabapentin 300 mg oral capsule: 1 cap(s) orally 3 times a day (26 Dec 2024 12:53)  lisinopril 10 mg oral tablet: 1 tab(s) orally once a day (2024 18:11)  pantoprazole 40 mg oral delayed release tablet: 1 tab(s) orally once a day (2024 18:11)  simvastatin 20 mg oral tablet: 1 tab(s) orally once a day (at bedtime) (2024 18:11)      VITALS:   T(F): 98.2 ( @ 07:59), Max: 98.2 ( @ 07:07)  HR: 60 ( @ 11:19) (59 - 100)  BP: 94/46 ( @ 11:19) (93/45 - 107/53)  BP(mean): 66 ( @ 11:19) (66 - 69)  RR: 19 ( @ 11:19) (19 - 19)  SpO2: 94% ( @ 11:19) (91% - 98%)    I&O's Summary      REVIEW OF SYSTEMS:  CONSTITUTIONAL: No weakness, fevers or chills  HEENT: No visual changes, neck/ear pain  RESPIRATORY: No cough, sob  CARDIOVASCULAR: See HPI  GASTROINTESTINAL: No abdominal pain. No nausea, vomiting, diarrhea   GENITOURINARY: No dysuria, frequency or hematuria  NEUROLOGICAL: No new focal deficits  SKIN: No new rashes    PHYSICAL EXAM:  General: Not in distress.  Non-toxic appearing.   HEENT: EOMI  Cardio: regular, S1, S2, no murmur  Pulm: B/L BS.  No wheezing / crackles / rales  Abdomen: Soft, non-tender, non-distended. Normoactive bowel sounds  Extremities: 2+ edema b/l le  Neuro: A&O x3. No focal deficits    LABS:                        12.2   19.34 )-----------( 139      ( 26 Dec 2024 07:30 )             39.8         133[L]  |  97[L]  |  39[H]  ----------------------------<  140[H]  5.8[H]   |  24  |  2.3[H]    Ca    8.2[L]      26 Dec 2024 07:30    TPro  6.2  /  Alb  3.6  /  TBili  1.2  /  DBili  x   /  AST  15  /  ALT  7   /  AlkPhos  117[H]      PT/INR - ( 26 Dec 2024 07:30 )   PT: 13.60 sec;   INR: 1.15 ratio      PTT - ( 26 Dec 2024 07:30 )  PTT:29.5 sec      RADIOLOGY:  Xray Chest 1 View- PORTABLE-Urgent (24 @ 08:08) >  Low lung volume. Mild pulmonary vascular congestion.      EC Lead ECG:   Ventricular Rate 60 BPM    Atrial Rate 60 BPM    P-R Interval 176 ms    QRS Duration 172 ms    Q-T Interval 458 ms    QTC Calculation(Bazett) 458 ms    P Axis -77 degrees    R Axis -59 degrees    T Axis 98 degrees    Diagnosis Line AV dual-paced rhythm  Abnormal ECG    Confirmed by IRENE SULLIVAN MD ([id number]) on 2024 10:57:29 AM ( @ 08:34)

## 2024-12-26 NOTE — ED ADULT NURSE NOTE - NSFALLHARMRISKINTERV_ED_ALL_ED

## 2024-12-26 NOTE — CONSULT NOTE ADULT - ASSESSMENT
Impression:  79 year old gentleman with a history of PPM and ischemic stroke several years ago which he experienced right sided weakness which was rehabbed back to baseline however patient with swollen LE and requires a cane and walker.   Patient also with RLE>LLE weakness due to knee pain (recent injection 1 week ago). Patient went to sleep in normal state of health at 2100 and woke up 0200 when wife noticed right facial asymmetry and dysarthria. Wife called 911 and taken to [facility] ED with code stroke en route. CTH without acute intracranial pathology, CTA with moderate-severe atherosclerotic stenosis and bilateral proximal ICAs (approximately 70%). Focal severe stenosis in the proximalV1 segment of the left vertebral artery. Patient out of the window for IV thrombolytics and back to baseline therefore not a candidate for IA intervention.  WBC 19. Etiology of symptoms more likely due to toxic metabolic encephalopathy.    Suggestion:  MRI brain without jcarlos if PPM compatible.   Continue ASA  Avoid dehydration, hypotension, hypovolemia.   Telemetry monitoring.   Medical management of infectious process.

## 2024-12-26 NOTE — ED PROVIDER NOTE - PHYSICAL EXAMINATION
CONSTITUTIONAL: Well-developed; well-nourished; in no acute distress.   SKIN: warm, dry  HEAD: Normocephalic; atraumatic.  EYES: PERRL, EOMI, no conjunctival erythema  ENT: No nasal discharge; airway clear.  NECK: Supple; non tender.  CARD: Regular rate and rhythm.   RESP: No wheezes, rales or rhonchi.  ABD: soft ntnd  EXT: Normal ROM.  Right sided leg swelling with + 2 pitting edema and venus stases   NEURO: Alert, oriented, grossly unremarkable  PSYCH: Cooperative, appropriate.

## 2024-12-26 NOTE — H&P ADULT - ATTENDING COMMENTS
seen this morning in er  spoke with wife at bedside  consults reviewed    on oxygen    Vitals noted-stable for now  scattered basilar crackles  rrr s1s2    labs noted    Acute hypoxic resp failue  Decompensated acute HFrEF  BOBBY   Sepsis  -plan as outlined above in detail  -continue monitoring

## 2024-12-26 NOTE — ED PROVIDER NOTE - WET READ LAUNCH FT
[FreeTextEntry1] : Diagnostic Tests: ------------------------ EK23-Sinus tachycardia at 105 bpm. Incomplete RBBB. LAFB.  08/10/23-Sinus tachycardia at 113 bpm. Incomplete RBBB. LAFB.  ------------------------ Echo:  23-TTE: EF 43%. Basal and mid inferoseptum, basal and mid inferior wall hypokinesis. Grade I DD. Trace MR. PASP 23 mmHg.  ----------------------- US:  23-LE Venous: Negative for DVT. No reflux on right. S/P LGSV stripping. No reflux seen on left.  ------------------------ Cath:  23-LHC: LM minor, LAD minor, D1 minor, LCx minor, OM1 minor, RCA minor. LVEDP 9 mmHg.  
There are no Wet Read(s) to document.

## 2024-12-26 NOTE — ED ADULT TRIAGE NOTE - CHIEF COMPLAINT QUOTE
prenote- pt BIBEMS c/o slurred speech, and worsening L sided facial droop. LWK 2:30 AM. pt with hx of R sided stroke. pt hypotensive and tachycardic en-route. . stroke code activated

## 2024-12-26 NOTE — H&P ADULT - NSHPPOADEEPVENOUSTHROMB_GEN_A_CORE
CHIEF COMPLAINT  Urinary Retention (pt with difficulty urinating, states he has had just two regular urinations and the rest has just been trickling out all day)      HISTORY OF PRESENT ILLNESS  Terrell Rosales is a 44 y.o. male who presents to the ED with difficulty urinating says he has had just 2 regular urinations today and mostly is been trickling out. Patient states he has had similar incidences since August 2020. He thinks he may have prostate issues. He has been having issues seeing a primary care doctor. No other complaints, modifying factors or associated symptoms. I have reviewed the following from the nursing documentation. Past Medical History:   Diagnosis Date    Anxiety     Chronic back pain     Chronic neck pain     Degenerative disc disease     Degenerative disc disease     Degenerative disc disease     Diverticulitis     Hypertension     Manic depression ([facility] [address]     Obesity     Scoliosis      Past Surgical History:   Procedure Laterality Date    KIDNEY STONE SURGERY       History reviewed. No pertinent family history.   Social History     Socioeconomic History    Marital status:      Spouse name: Not on file    Number of children: Not on file    Years of education: Not on file    Highest education level: Not on file   Occupational History    Not on file   Social Needs    Financial resource strain: Not on file    Food insecurity     Worry: Not on file     Inability: Not on file    Transportation needs     Medical: Not on file     Non-medical: Not on file   Tobacco Use    Smoking status: Current Every Day Smoker     Packs/day: 0.50     Years: 25.00     Pack years: 12.50     Types: Cigarettes    Smokeless tobacco: Never Used   Substance and Sexual Activity    Alcohol use: Yes     Comment: OCC     Drug use: No    Sexual activity: Yes     Partners: Female   Lifestyle    Physical activity     Days per week: Not on file     Minutes per session: Not on file    Stress: Not on file   Relationships    Social connections     Talks on phone: Not on file     Gets together: Not on file     Attends Zoroastrianism service: Not on file     Active member of club or organization: Not on file     Attends meetings of clubs or organizations: Not on file     Relationship status: Not on file    Intimate partner violence     Fear of current or ex partner: Not on file     Emotionally abused: Not on file     Physically abused: Not on file     Forced sexual activity: Not on file   Other Topics Concern    Not on file   Social History Narrative    Not on file     No current facility-administered medications for this encounter. Current Outpatient Medications   Medication Sig Dispense Refill    diphenhydrAMINE (BENADRYL) 25 MG capsule Take 1 capsule by mouth every 6 hours as needed for Allergies 5 capsule 0    tamsulosin (FLOMAX) 0.4 MG capsule Take 1 capsule by mouth daily 30 capsule 0    lisinopril-hydroCHLOROthiazide (PRINZIDE;ZESTORETIC) 20-25 MG per tablet Take 1 tablet by mouth daily 30 tablet 0    ibuprofen (ADVIL;MOTRIN) 600 MG tablet Take 1 tablet by mouth 3 times daily (with meals) 40 tablet 0    aspirin 81 MG chewable tablet Take 1 tablet by mouth daily. 30 tablet 3     Allergies   Allergen Reactions    Ciprofloxacin Itching       REVIEW OF SYSTEMS  10 systems reviewed, pertinent positives per HPI otherwise noted to be negative. PHYSICAL EXAM  BP (!) 148/65   Pulse 111   Temp 98.8 °F (37.1 °C) (Oral)   Resp 18   Ht 6' (1.829 m)   Wt 295 lb (133.8 kg)   SpO2 96%   BMI 40.01 kg/m²   GENERAL APPEARANCE: Awake and alert. Cooperative. No acute distress. HEAD: Normocephalic. Atraumatic. EYES: PERRL. EOM's grossly intact. ENT: Mucous membranes are moist.   NECK: Supple. HEART: RRR. CHEST/LUNGS: Chest atraumatic, nontender, respirations unlabored. CTAB. Good air exchange. Speaking comfortably in full sentences. BACK: No midline spinal tenderness or step-off. ABDOMEN: Soft. Non-distended. Non-tender. No guarding or rebound. Normal bowel sounds. EXTREMITIES: No peripheral edema. Moves all extremities equally. All extremities neurovascularly intact. RECTAL/: Deferred  SKIN: Warm and dry. No acute rashes. NEUROLOGICAL: Alert and oriented. CN 2-12 intact, No gross facial drooping. Strength 5/5, sensation intact. Normal coordination. Gait normal.   PSYCHIATRIC: Normal mood and affect. LABS  I have reviewed all labs for this visit.    Results for orders placed or performed during the hospital encounter of 12/24/20   Urinalysis Reflex to Culture   Result Value Ref Range    Color, UA Yellow Straw/Yellow    Clarity, UA Clear Clear    Glucose, Ur Negative Negative mg/dL    Bilirubin Urine Negative Negative    Ketones, Urine Negative Negative mg/dL    Specific Gravity, UA 1.025 1.005 - 1.030    Blood, Urine MODERATE (A) Negative    pH, UA 6.0 5.0 - 8.0    Protein, UA Negative Negative mg/dL    Urobilinogen, Urine 0.2 <2.0 E.U./dL    Nitrite, Urine Negative Negative    Leukocyte Esterase, Urine Negative Negative    Microscopic Examination YES     Urine Type NotGiven     Urine Reflex to Culture Not Indicated    Microscopic Urinalysis   Result Value Ref Range    Hyaline Casts, UA 0-2 0 - 2 /LPF    WBC, UA 0-2 0 - 5 /HPF    RBC, UA 21-50 (A) 0 - 4 /HPF    Bacteria, UA Rare (A) None Seen /HPF   CBC Auto Differential   Result Value Ref Range    WBC 13.9 (H) 4.0 - 11.0 K/uL    RBC 4.27 4.20 - 5.90 M/uL    Hemoglobin 14.6 13.5 - 17.5 g/dL    Hematocrit 42.3 40.5 - 52.5 %    MCV 99.0 80.0 - 100.0 fL    MCH 34.2 (H) 26.0 - 34.0 pg    MCHC 34.6 31.0 - 36.0 g/dL    RDW 12.8 12.4 - 15.4 %    Platelets 076 169 - 642 K/uL    MPV 6.6 5.0 - 10.5 fL    Neutrophils % 62.0 %    Lymphocytes % 17.0 %    Monocytes % 4.0 %    Eosinophils % 3.0 %    Basophils % 1.0 %    Neutrophils Absolute 10.0 (H) 1.7 - 7.7 K/uL    Lymphocytes Absolute 2.8 1.0 - 5.1 K/uL    Monocytes Absolute 0.6 0.0 - 1.3 K/uL    Eosinophils Absolute 0.4 0.0 - 0.6 K/uL    Basophils Absolute 0.1 0.0 - 0.2 K/uL    Bands Relative 10 (H) 0 - 7 %    Atypical Lymphocytes Relative 3 0 - 6 %    Anisocytosis Occasional (A)     Poikilocytes Occasional (A)    Comprehensive Metabolic Panel w/ Reflex to MG   Result Value Ref Range    Sodium 130 (L) 136 - 145 mmol/L    Potassium reflex Magnesium 3.7 3.5 - 5.1 mmol/L    Chloride 94 (L) 99 - 110 mmol/L    CO2 25 21 - 32 mmol/L    Anion Gap 11 3 - 16    Glucose 112 (H) 70 - 99 mg/dL    BUN 27 (H) 7 - 20 mg/dL    CREATININE 1.1 0.9 - 1.3 mg/dL    GFR Non-African American >60 >60    GFR African American >60 >60    Calcium 9.3 8.3 - 10.6 mg/dL    Total Protein 7.4 6.4 - 8.2 g/dL    Alb 4.8 3.4 - 5.0 g/dL    Albumin/Globulin Ratio 1.8 1.1 - 2.2    Total Bilirubin 0.3 0.0 - 1.0 mg/dL    Alkaline Phosphatase 102 40 - 129 U/L    ALT 32 10 - 40 U/L    AST 24 15 - 37 U/L    Globulin 2.6 g/dL         RADIOLOGY  X-RAYS:  I have reviewed radiologic plain film image(s). ALL OTHER NON-PLAIN FILM IMAGES SUCH AS CT, ULTRASOUND AND MRI HAVE BEEN READ BY THE RADIOLOGIST. CT ABDOMEN PELVIS W IV CONTRAST Additional Contrast? None   Final Result   1. Punctate nonobstructing calculus again seen in the distal left ureter. 2. Diverticulosis without scan evidence for diverticulitis. ED COURSE/MDM  Patient seen and evaluated. CT scan does note a small nonobstructing calculus in the distal left ureter. I do think the patient can be discharged home with follow-up with urology, he does not want a catheter placed. I did tell him we can double his dose of Flomax to 0.8 mg a day and to increase his water intake. All diagnostic tests reviewed and results discussed with patient. Plan of care discussed with patient. Patient in agreement with plan. CLINICAL IMPRESSION  1. Other microscopic hematuria    2. Right lower quadrant abdominal pain    3. Urine retention    4.  Ureteric stone        Blood pressure (!) 148/65, pulse 111, temperature 98.8 °F (37.1 °C), temperature source Oral, resp. rate 18, height 6' (1.829 m), weight 295 lb (133.8 kg), SpO2 96 %. [address] was discharged to home in stable condition. This chart was generated in part by using Dragon Dictation system and may contain errors related to that system including errors in grammar, punctuation, and spelling, as well as words and phrases that may be inappropriate. When dictating, effort is made to correct spelling/grammar errors. If there are any questions or concerns please feel free to contact the dictating provider for clarification.      Gibran Caro DO  ATTENDING, [facility], DO  12/26/20 1944 no

## 2024-12-26 NOTE — CONSULT NOTE ADULT - SUBJECTIVE AND OBJECTIVE BOX
Neurology Consult    Patient is a 79y old  Male who presents with a chief complaint of dysarthria    HPI:  79 year old gentleman with a history of HTN, CABG, DVT, DLD, PPM and ischemic stroke several years ago which he experienced right sided weakness which was rehabbed back to baseline however patient with swollen LE and requires a cane and walker. Patient also with RLE>LLE weakness due to knee pain (recent injection 1 week ago). Patient went to sleep in normal state of health at 2100 and woke up 0200 when wife noticed right facial asymmetry and dysarthria. Wife called 911 and taken to [facility] ED with code stroke en route. On arrival SBP 80s, returned to baseline after SBP >100.     PAST MEDICAL & SURGICAL HISTORY:  Pacemaker      Obesity      HTN (hypertension)      BPH (benign prostatic hyperplasia)      DVT (deep venous thrombosis)      Hypercholesterolemia      CAD (coronary artery disease)      CVA (cerebral vascular accident)      Pacemaker  2010 2017      S/P gastric bypass  2001      S/P primary angioplasty      S/P CABG (coronary artery bypass graft)          FAMILY HISTORY:      Social History: (-) x 3    Allergies    No Known Allergies    Intolerances        MEDICATIONS  (STANDING):  vancomycin  IVPB. 1000 milliGRAM(s) IV Intermittent once    MEDICATIONS  (PRN):    Vital Signs Last 24 Hrs  T(C): 36.8 (26 Dec 2024 07:59), Max: 36.8 (26 Dec 2024 07:07)  T(F): 98.2 (26 Dec 2024 07:59), Max: 98.2 (26 Dec 2024 07:07)  HR: 61 (26 Dec 2024 07:59) (61 - 100)  BP: 107/53 (26 Dec 2024 07:59) (93/45 - 107/53)  BP(mean): --  RR: 19 (26 Dec 2024 07:59) (19 - 19)  SpO2: 96% (26 Dec 2024 07:59) (91% - 96%)    Parameters below as of 26 Dec 2024 07:59  Patient On (Oxygen Delivery Method): nasal cannula  O2 Flow (L/min): 3      Examination:  General:  Appearance is consistent with chronologic age.  No abnormal facies.  Gross skin survey within normal limits.    Cognitive/Language:  The patient is oriented to person, place, time and date.  Recent and remote memory intact.    Fund of knowledge is intact and normal.  Language with normal repetition, comprehension and naming.  Nondysarthric.    Eyes: intact VA, VFF.  EOMI w/o nystagmus, skew or reported double vision.  PERRL.  No ptosis/weakness of eyelid closure.    Face:  Facial sensation normal V1 - 3, no facial asymmetry.    Formal Muscle Strength Testing: (MRC grade R/L) 5/5 UE; RLE 3/5 LLE 4/5 limited ROM due to edematous legs and RLE knee pain  Sensory examination:   Intact to light touch  in all extremities.  Cerebellum:   FTN intact  HTS limited ROM     NIHSS 3  MRS 3    Labs:   CBC Full  -  ( 26 Dec 2024 07:30 )  WBC Count : 19.34 K/uL  RBC Count : 4.22 M/uL  Hemoglobin : 12.2 g/dL  Hematocrit : 39.8 %  Platelet Count - Automated : 139 K/uL  Mean Cell Volume : 94.3 fL  Mean Cell Hemoglobin : 28.9 pg  Mean Cell Hemoglobin Concentration : 30.7 g/dL  Auto Neutrophil # : 17.54 K/uL  Auto Lymphocyte # : 0.67 K/uL  Auto Monocyte # : 0.92 K/uL  Auto Eosinophil # : 0.02 K/uL  Auto Basophil # : 0.05 K/uL  Auto Neutrophil % : 90.6 %  Auto Lymphocyte % : 3.5 %  Auto Monocyte % : 4.8 %  Auto Eosinophil % : 0.1 %  Auto Basophil % : 0.3 %    12-26    133[L]  |  97[L]  |  39[H]  ----------------------------<  140[H]  5.8[H]   |  24  |  2.3[H]    Ca    8.2[L]      26 Dec 2024 07:30    TPro  6.2  /  Alb  3.6  /  TBili  1.2  /  DBili  x   /  AST  15  /  ALT  7   /  AlkPhos  117[H]  12-26    LIVER FUNCTIONS - ( 26 Dec 2024 07:30 )  Alb: 3.6 g/dL / Pro: 6.2 g/dL / ALK PHOS: 117 U/L / ALT: 7 U/L / AST: 15 U/L / GGT: x           PT/INR - ( 26 Dec 2024 07:30 )   PT: 13.60 sec;   INR: 1.15 ratio         PTT - ( 26 Dec 2024 07:30 )  PTT:29.5 sec  Urinalysis Basic - ( 26 Dec 2024 07:30 )    Color: x / Appearance: x / SG: x / pH: x  Gluc: 140 mg/dL / Ketone: x  / Bili: x / Urobili: x   Blood: x / Protein: x / Nitrite: x   Leuk Esterase: x / RBC: x / WBC x   Sq Epi: x / Non Sq Epi: x / Bacteria: x          Neuroimaging:  < from: CT Brain Stroke Protocol (12.26.24 @ 07:42) >  IMPRESSION:    No acute intracranial pathology.    < end of copied text >  < from: CT Angio Neck Stroke Protocol w/ IV Cont (12.26.24 @ 07:57) >    IMPRESSION:    CT PERFUSION:  No perfusion deficits to suggest areas of completed infarction or at risk   territory.    CTA HEAD/NECK:  No large vessel occlusion, aneurysm, or vascular malformation.    Moderate-severe atherosclerotic stenosis and bilateral proximal ICAs   (approximately 70%).    Focal severe stenosis in the proximalV1 segment of the left vertebral   artery.    < end of copied text >

## 2024-12-26 NOTE — ED ADULT TRIAGE NOTE - TEMP AT ED ARRIVAL (C)
Date:January 29, 2019      Patient was self referred, no letter generated. Do not send.        [facility] Information       68 36.8 negative...

## 2024-12-26 NOTE — STROKE CODE NOTE - IV ALTEPLASE EXCLUSION ABS OTHER
Out of the window for IV thrombolytics and back to baseline not a candidate for IV or IA acute stroke intervention.

## 2024-12-26 NOTE — CONSULT NOTE ADULT - ATTENDING COMMENTS
79yoM patient of Dr. Price with CAD s/p CABG (patent LIMA-LAD, occluded SVG-OM1-PDA) s/p PCI to RCA in 2011, HFpEF, HTN, DM, and CHB s/p PPM who presented with 1 day of dyspnea in the setting of 1 week of worsening MARIA EUGENIA. Patient is supposed to take Lasix 40 mg PO daily at home, but he often skips doses because he does not want to urinate often. He is not always compliant with a low sodium diet. Per the wife, he has not urinated much today.     Data notable for WBC 19.34, K 5.8, and Cr 2.3 (baseline Cr 1.2). Trpoonin 83 --> 112. ECG is AV paced. CXR with vascular congestion. On my exam, patient with elevated JVP and 2+ pitting edema of the bilateral legs.     Patient admitted with volume overload and BOBBY. His elevated Cr may be cardiorenal syndrome in the s/o acute-on-chronic diastolic dysfunction OR it may be a primary urological problem causing volume overload. In speaking with the Primary Team, patient underwent bladder scan, which showed 300 cc. This suggests urinary retention may be the primary issue. UA with (+) LE, (-) nitrites, and occasional bacteria.     Recommendations:   - Bladder scans q 6-8 hours and consider Velázquez placement if still retaining  - Start Lasix 80 mg IV BID  - Hold lisinopril in the s/o BOBBY  - If patient develops a post-ATN diuresis, can hold Lasix  - Goal net negative 1.5 L  - TTE  - Please work-up BOBBY as this may be the cause of volume overload  - Trend Cr  - Once patient is euvolemic and Cr close to baseline, transition to Lasix 40 mg PO daily   - Cardiology consult team to sign off

## 2024-12-26 NOTE — H&P ADULT - HISTORY OF PRESENT ILLNESS
78 yo m with pmh of htn, hld, bph, cad/pad on ASA brought in by EMS for slurred speech.  Wife states that at 2 AM she was helping to the bathroom noted slurred speech and a facial droop patient went to bed at 9 PM.  Patient complaining of shortness of breath. Per EMS patient was found to be hypoxic to 88 improved with 2 L nasal cannula.  Patient denies any recent fevers cough chest pain nausea vomiting or abdominal pain.     ED Vitals:  · BP Systolic  93 mm Hg  · BP Diastolic  45 mm Hg  · Heart Rate 100 /min  · Respiration Rate (breaths/min) 19 /min  · Temp (F) 98.2 Degrees F  · Temp (C) 36.8 Degrees C  · SpO2 (%)  91 % on 2L nc    Labs: WBC 19K, hGB 12.2, nA 133, k 5.8, bun 39, creat 2.3, gfr 28, trop 83, proBNP 3344  VBG: ph 7.29, lactate 1, c02 55, bicarb 26  UA positive, wbc 270, moderate LE    Xray chest: Mild pulmonary vascular congestion    CT PERFUSION: No perfusion deficits to suggest areas of completed infarction or at risk   territory.    CTA HEAD/NECK: No large vessel occlusion, aneurysm, or vascular malformation. Moderate-severe atherosclerotic stenosis and bilateral proximal ICAs   (approximately 70%).Focal severe stenosis in the proximalV1 segment of the left vertebral artery.    CTA PE: No CT evidence of acute pulmonary embolus. Bilateral areas of faint groundglass opacities, are nonspecific. Dilated ascending thoracic aorta measuring 4 cm. Additional findings are detailed inthe body of the report.    Stoke code called, patient is outside the appropriate time window for thrombolytic therapy and back to baseline not a candidate for IV or IA acute stroke intervention.    Patient admitted for further management. 78 yo m with pmh of htn, hld, bph, cad/pad on ASA brought in by EMS for slurred speech. Patient also reported sob and leg swelling for the past months. Per EMS patient was found to be hypoxic to 88 improved with 2 L nasal cannula.  Patient denies any recent fevers cough chest pain nausea vomiting or abdominal pain. Stroke code called, imaging negative for acute stroke. On my encounter, patient denies any neurologic symptoms, back at baseline.     ED Vitals:  · BP Systolic  93 mm Hg  · BP Diastolic  45 mm Hg  · Heart Rate 100 /min  · Respiration Rate (breaths/min) 19 /min  · Temp (F) 98.2 Degrees F  · Temp (C) 36.8 Degrees C  · SpO2 (%)  91 % on 2L nc    Labs: WBC 19K, hGB 12.2, nA 133, k 5.8, bun 39, creat 2.3, gfr 28, trop 83, proBNP 3344  VBG: ph 7.29, lactate 1, c02 55, bicarb 26  UA positive, wbc 270, moderate LE    Xray chest: Mild pulmonary vascular congestion    Patient admitted for further management.

## 2024-12-26 NOTE — CONSULT NOTE ADULT - ASSESSMENT
*************** INCOMPLETE NOTE ****************      78 yo m with pmh of htn, hld, bph, cad/pad on ASA brought in by EMS for slurred speech now back to baseline. Patient also reported sob and leg swelling for the past months. Found to be hypoxic and in bobby. Admitted for further management.    # Acute hypoxic respiratory failure likely 2/2 chf  # Elevated trops  # CAD s/p cabg and RCA  stent  # Complete Heart Block s/p PPM  - Patient reports sob and LE edema for the past month. Denies chest pain  - Not on home O2  - Currently on 2l nc  - [facility]ocus ED, normal EF, no pericardial effusion  - Xray chest: Mild pulmonary vascular congestion. No opacities  - On lasix at home, no previous echo on file, per patient did not have an echo recently  - trop 83, proBNP 3344  - EKG no ischemic changes  - check echo  - Trend trops  - Start lasix 80mg IV bid  - Cardio eval  - Admit telemetry    # Sepsis POA  - Labs: WBC 19K, k 5.8, bun 39, creat 2.3, gfr 28, trop 83, proBNP 3344  - VBG: ph 7.29, lactate 1, c02 55, bicarb 26  - UA positive, wbc 270, moderate LE  - Xray chest: Mild pulmonary vascular congestion. No opacities  - Send rvp  - c/w cefepime and vanc for now   - f/u bcx, ucx and procal    #BOBBY - likely cardio-renal  #Hyperkalemia  - creatinine 2.3 (baseline 1)  - check urine studies  - check renal U/S   - c/w lasix  - Strict I&Os  - Monitor bmp bid    # Slurred speech and facial droop/ TIA? - back to baseline  - Per ED, patient reporting slurred speech and facial droop  - CT PERFUSION: No perfusion deficits to suggest areas of completed infarction or at risk territory.  - CTA HEAD/NECK: No large vessel occlusion, aneurysm, or vascular malformation. Moderate-severe atherosclerotic stenosis and bilateral proximal ICAs (approximately 70%).Focal severe stenosis in the proximalV1 segment of the left vertebral artery.  - CTA PE: No CT evidence of acute pulmonary embolus. Bilateral areas of faint groundglass opacities, are nonspecific. Dilated ascending thoracic aorta measuring 4 cm. Additional findings are detailed inthe body of the report.  - Stoke code called, patient is outside the appropriate time window for thrombolytic therapy and back to baseline not a candidate for IV or IA acute stroke intervention.  - On my encounter, patient at baseline, denies any neurologic symptoms  - Follow up neurology    # BPH?  - Bladder scan    # HTN  - BP borderline  - hold lisinopril in the setting of BOBBY    # HLD  # PAD  - continue aspirin & statin   80 yo m with pmh of htn, hld, bph, cad/pad on ASA brought in by EMS for slurred speech now back to baseline. Patient also reported sob and leg swelling for the past months. Found to be hypoxic and in bobby. Admitted for further management.    # Acute Decompensated Systolic HF  # Acute hypoxic respiratory failure  # Type 2 MI   # BOBBY on CKD (likely cardiorenal)  # CAD s/p cabg and RCA stent  # Complete Heart Block s/p PPM  - pt non compliant with meds and diet  - volume overloaded on exam   - Currently on 2l nc  - trop 83 --> 112  - proBNP 3344  - EKG: A-V paced  - Xray chest: Mild pulmonary vascular congestion. No opacities  - check echo  - Start Lasix 80mg IV bid  - d/c home lisinopril   - c/w ASA and atorvastatin   - bladder scan   - tele monitoring

## 2024-12-26 NOTE — H&P ADULT - NSHPLABSRESULTS_GEN_ALL_CORE
Detail Level: Zone .  LABS:                         12.2   19.34 )-----------( 139      ( 26 Dec 2024 07:30 )             39.8     12-26    133[L]  |  97[L]  |  39[H]  ----------------------------<  140[H]  5.8[H]   |  24  |  2.3[H]    Ca    8.2[L]      26 Dec 2024 07:30    TPro  6.2  /  Alb  3.6  /  TBili  1.2  /  DBili  x   /  AST  15  /  ALT  7   /  AlkPhos  117[H]  12-26    PT/INR - ( 26 Dec 2024 07:30 )   PT: 13.60 sec;   INR: 1.15 ratio         PTT - ( 26 Dec 2024 07:30 )  PTT:29.5 sec  Urinalysis Basic - ( 26 Dec 2024 09:27 )    Color: Yellow / Appearance: Cloudy / SG: >1.030 / pH: x  Gluc: x / Ketone: Negative mg/dL  / Bili: Negative / Urobili: 1.0 mg/dL   Blood: x / Protein: 30 mg/dL / Nitrite: Negative   Leuk Esterase: Moderate / RBC: 2 /HPF /  /HPF   Sq Epi: x / Non Sq Epi: 2 /HPF / Bacteria: Occasional /HPF            RADIOLOGY, EKG & ADDITIONAL TESTS: Reviewed. Detail Level: Detailed

## 2024-12-26 NOTE — H&P ADULT - ASSESSMENT
80 yo m with pmh of htn, hld, bph, cad/pad on ASA brought in by EMS for slurred speech, found to be hypoxic and in BOBBY. Patient admitted for further management.    #Acute hypoxic respiratory failure likely 2/2 chf vs pna  #UTI  #Sepsis POA  - Patient reports sob for the past few days  - Labs: WBC 19K, k 5.8, bun 39, creat 2.3, gfr 28, trop 83, proBNP 3344  - VBG: ph 7.29, lactate 1, c02 55, bicarb 26  - UA positive, wbc 270, moderate LE  - not on oxygen at home  - Currently on 2L nc   - Xray chest: Mild pulmonary vascular congestion. No opacities  - Send rvp  - check echo  - Wean O2 as tolerated  - c/w cefepime and vanc for now   - f/u bcx, ucx and procal    #BOBBY - cardio-renal?  #Hyperkalemia  - creatinine 2.3 (baseline 1)  - check urine studies  - check renal U/S   - Strict I&Os    #Slurred speech and facial droop/ TIA? - back to baseline  - Wife states that at 2 AM she was helping to the bathroom noted slurred speech and a facial droop   - Last known well at 9 PM.   - Code stroke called in ED  - CT PERFUSION: No perfusion deficits to suggest areas of completed infarction or at risk territory.  - CTA HEAD/NECK: No large vessel occlusion, aneurysm, or vascular malformation. Moderate-severe atherosclerotic stenosis and bilateral proximal ICAs (approximately 70%).Focal severe stenosis in the proximalV1 segment of the left vertebral artery.  - CTA PE: No CT evidence of acute pulmonary embolus. Bilateral areas of faint groundglass opacities, are nonspecific. Dilated ascending thoracic aorta measuring 4 cm. Additional findings are detailed inthe body of the report.  - Stoke code called, patient is outside the appropriate time window for thrombolytic therapy and back to baseline not a candidate for IV or IA acute stroke intervention.  - Follow up neurology    #BPH  - continue tamsulosin  - Bladder scan    #HTN  - hold lasix & lisinopril    #HLD  #PAD  - continue aspirin & statin    DIET: DASH  DVT prophylaxis: heparin  CODE: full   78 yo m with pmh of htn, hld, bph, cad/pad on ASA brought in by EMS for slurred speech, found to be hypoxic and in BOBBY. Patient admitted for further management.    #Acute hypoxic respiratory failure likely 2/2 chf vs pna  #UTI  #Sepsis POA  - Patient reports sob for the past few days  - Labs: WBC 19K, k 5.8, bun 39, creat 2.3, gfr 28, trop 83, proBNP 3344  - VBG: ph 7.29, lactate 1, c02 55, bicarb 26  - UA positive, wbc 270, moderate LE  - not on oxygen at home  - Currently on 2L nc   - Xray chest: Mild pulmonary vascular congestion. No opacities  - Send rvp  - check echo  - Wean O2 as tolerated  - c/w cefepime and vanc for now   - f/u bcx, ucx and procal    #BOBBY - cardio-renal?  #Hyperkalemia  - creatinine 2.3 (baseline 1)  - check urine studies  - check renal U/S   - Strict I&Os    #Slurred speech and facial droop/ TIA? - back to baseline  - Wife states that at 2 AM she was helping to the bathroom noted slurred speech and a facial droop   - Last known well at 9 PM.   - Code stroke called in ED  - CT PERFUSION: No perfusion deficits to suggest areas of completed infarction or at risk territory.  - CTA HEAD/NECK: No large vessel occlusion, aneurysm, or vascular malformation. Moderate-severe atherosclerotic stenosis and bilateral proximal ICAs (approximately 70%).Focal severe stenosis in the proximalV1 segment of the left vertebral artery.  - CTA PE: No CT evidence of acute pulmonary embolus. Bilateral areas of faint groundglass opacities, are nonspecific. Dilated ascending thoracic aorta measuring 4 cm. Additional findings are detailed inthe body of the report.  - Stoke code called, patient is outside the appropriate time window for thrombolytic therapy and back to baseline not a candidate for IV or IA acute stroke intervention.  - Follow up neurology    # Hx of Complete atrioventricular block s/p ppm  #CAD s/p cabg and RCA stent  # Elevated trops  - Patient denies chest pain  - No previous echo   - trop 88, trend x3  - EKG no ischemic changes  - Check echo  - Admit tele  - Cardio eval    #BPH  - continue tamsulosin  - Bladder scan    #HTN  - hold lasix & lisinopril    #HLD  #PAD  - continue aspirin & statin    DIET: DASH  DVT prophylaxis: heparin  CODE: full   80 yo m with pmh of htn, hld, bph, cad/pad on ASA brought in by EMS for slurred speech now back to baseline. Patient also reported sob and leg swelling for the past months. Found to be hypoxic and in bobby. Admitted for further management.    #Acute hypoxic respiratory failure likely 2/2 chf  #Elevated trops  #CAD s/p cabg and RCA  stent  #Complete Heart Block s/p PPM  - Patient reports sob and LE edema for the past month. Denies chest pain  - Not on home O2  - Currently on 2l nc  - [facility]ocus ED, normal EF, no pericardial effusion  - Xray chest: Mild pulmonary vascular congestion. No opacities  - On lasix at home, no previous echo on file, per patient did not have an echo recently  - trop 83, proBNP 3344  - EKG no ischemic changes  - check echo  - Trend trops  - Start lasix 80mg IV bid  - Cardio eval  - Admit telemetry    #Sepsis POA  - Labs: WBC 19K, k 5.8, bun 39, creat 2.3, gfr 28, trop 83, proBNP 3344  - VBG: ph 7.29, lactate 1, c02 55, bicarb 26  - UA positive, wbc 270, moderate LE  - Xray chest: Mild pulmonary vascular congestion. No opacities  - Send rvp  - c/w cefepime and vanc for now   - f/u bcx, ucx and procal    #BOBBY - likely cardio-renal  #Hyperkalemia  - creatinine 2.3 (baseline 1)  - check urine studies  - check renal U/S   - c/w lasix  - Strict I&Os  - Monitor bmp bid    #Slurred speech and facial droop/ TIA? - back to baseline  - Per ED, patient reporting slurred speech and facial droop  - CT PERFUSION: No perfusion deficits to suggest areas of completed infarction or at risk territory.  - CTA HEAD/NECK: No large vessel occlusion, aneurysm, or vascular malformation. Moderate-severe atherosclerotic stenosis and bilateral proximal ICAs (approximately 70%).Focal severe stenosis in the proximalV1 segment of the left vertebral artery.  - CTA PE: No CT evidence of acute pulmonary embolus. Bilateral areas of faint groundglass opacities, are nonspecific. Dilated ascending thoracic aorta measuring 4 cm. Additional findings are detailed inthe body of the report.  - Stoke code called, patient is outside the appropriate time window for thrombolytic therapy and back to baseline not a candidate for IV or IA acute stroke intervention.  - On my encounter, patient at baseline, denies any neurologic symptoms  - Follow up neurology    #BPH?  - Bladder scan    #HTN  - BP borderline  - hold lisinopril in the setting of BOBBY    #HLD  #PAD  - continue aspirin & statin    DIET: DASH  DVT prophylaxis: heparin  CODE: full

## 2024-12-26 NOTE — ED PROVIDER NOTE - ATTENDING CONTRIBUTION TO CARE
79-year-old man history of hypertension hyperlipidemia BPH CAD PVD CVA brought in by EMS for for stroke.  Wife states that at 2 AM she was helping to the bathroom noted slurred speech and a facial droop patient went to bed at 9 PM.  Patient woke up this morning complaining of shortness of breath, brought into hospital via EMS.  EMS states that when they arrived to patient he was found to be hypoxic to 88 improved with 2 L nasal cannula.  Denies any recent fevers cough chest pain nausea vomiting or abdominal pain.  Stroke code activated prior to arrival.  As per EMS patient was found to be hypotensive systolic in the 80s fluids via EMS.  Upon arrival to the ED patient found to be with a systolic of 90 IV access placed, fluids given and patient taken over to CT 79-year-old man history of hypertension hyperlipidemia BPH CAD PVD CVA brought in by EMS for for stroke.  Wife states that at 2 AM she was helping to the bathroom noted slurred speech and a facial droop patient went to bed at 9 PM.  Patient woke up this morning complaining of shortness of breath, brought into hospital via EMS.  EMS states that when they arrived to patient he was found to be hypoxic to 88 improved with 2 L nasal cannula.  Denies any recent fevers cough chest pain nausea vomiting or abdominal pain.  Stroke code activated prior to arrival.  As per EMS patient was found to be hypotensive systolic in the 80s fluids via EMS.  Upon arrival to the ED patient found to be with a systolic of 90 IV access placed, fluids given and patient taken over to CT  CONSTITUTIONAL: WA / WN / NAD  HEAD: NCAT  EYES: PERRL; EOMI;   ENT: Normal pharynx; mucous membranes pink/moist, no erythema.  NECK: Supple; no meningeal signs  CARD: RRR; nl S1/S2; no M/R/G.   RESP: Respiratory rate and effort are normal; breath sounds clear and equal bilaterally.  ABD: Soft, NT ND  MSK/EXT: No gross deformities; full range of motion. 2+ pitting edema R le > L   SKIN: Warm and dry;   NEURO: AAOx3 +facial droop + drip   PSYCH: Memory Intact, Normal Affect 79-year-old man history of hypertension hyperlipidemia BPH CAD PVD CVA brought in by EMS for for stroke.  Wife states that at 2 AM she was helping to the bathroom noted slurred speech and a facial droop patient went to bed at 9 PM.  Patient woke up this morning complaining of shortness of breath, brought into hospital via EMS.  EMS states that when they arrived to patient he was found to be hypoxic to 88 improved with 2 L nasal cannula.  Denies any recent fevers cough chest pain nausea vomiting or abdominal pain.  Stroke code activated prior to arrival.  As per EMS patient was found to be hypotensive systolic in the 80s fluids via EMS.  Upon arrival to the ED patient found to be with a systolic of 90 IV access placed, fluids given and patient taken over to CT  CONSTITUTIONAL: WA / WN / NAD  HEAD: NCAT  EYES: PERRL; EOMI;   ENT: Normal pharynx; mucous membranes pink/moist, no erythema.  NECK: Supple; no meningeal signs  CARD: RRR; nl S1/S2; no M/R/G.   RESP: Respiratory rate and effort are normal; breath sounds clear and equal bilaterally.  ABD: Soft, NT ND  MSK/EXT: No gross deformities; full range of motion. 2+ pitting edema R le > L   SKIN: Warm and dry;   NEURO: AAOx3 +facial droop   PSYCH: Memory Intact, Normal Affect

## 2024-12-26 NOTE — H&P ADULT - TIME BILLING
extensive review of history, speaking with patient and wife, reviewing consults, examining patient, counseling and coordinating care of extremely complex patient

## 2024-12-26 NOTE — ED PROVIDER NOTE - CLINICAL SUMMARY MEDICAL DECISION MAKING FREE TEXT BOX
79-year-old man history of hypertension hyperlipidemia BPH CAD PVD CVA brought in by EMS for for stroke.  Wife states that at 2 AM she was helping to the bathroom noted slurred speech and a facial droop patient went to bed at 9 PM.  Patient woke up this morning complaining of shortness of breath, brought into hospital via EMS.  EMS states that when they arrived to patient he was found to be hypoxic to 88 improved with 2 L nasal cannula.  Denies any recent fevers cough chest pain nausea vomiting or abdominal pain.  Stroke code activated prior to arrival.  As per EMS patient was found to be hypotensive systolic in the 80s fluids via EMS.  Upon arrival to the ED patient found to be with a systolic of 90 IV access placed, fluids given and patient taken over to CT vs reviewed labs imaging ekg obtained and reviewed. Ct's noted, discussed case with neuro recommended admission to medicine. Elevated troponin noted cardiology consulted. Broad spectrum antibiotics given secondary to elevated wbc, found to have uti.

## 2024-12-26 NOTE — ED PROVIDER NOTE - OBJECTIVE STATEMENT
78 yo m with pmh of HTN, DLP, BPH, PAD on ASA presents today for slurred speech.  Patient went to sleep approximately 9:00 last night and woke up at 2 AM with slurred speech and facial droop.  Patient does have a record of her previous CVA on the right side with some deficits however wife thought these deficits were more pronounced today.  Patient complaining of some slight shortness of breath and unilateral leg swelling.  Patient denies any preceding fevers chills mimi pain nausea vomiting chest pain.

## 2024-12-26 NOTE — ED PROVIDER NOTE - CONSIDERATION OF ADMISSION OBSERVATION
Consideration of Admission/Observation Additional history was obtained from ems and wife. Escalation to admission/observation was considered. At this time patient requires inpatient hospitalization.

## 2024-12-26 NOTE — ED PROVIDER NOTE - RHYTHM STRIP/ULTRASOUND IMAGES/CT SCAN IMAGES SHOWED
ED POCUS TTE  my independent interpretation - Dr. Ricky Moreno: [No pericardial effusion , grossly normal ef]

## 2024-12-26 NOTE — PATIENT PROFILE ADULT - FALL HARM RISK - HARM RISK INTERVENTIONS
Assistance with ambulation/Assistance OOB with selected safe patient handling equipment/Communicate Risk of Fall with Harm to all staff/Discuss with provider need for PT consult/Monitor gait and stability/Provide patient with walking aids - walker, cane, crutches/Reinforce activity limits and safety measures with patient and family/Tailored Fall Risk Interventions/Use of alarms - bed, chair and/or voice tab/Visual Cue: Yellow wristband and red socks/Bed in lowest position, wheels locked, appropriate side rails in place/Call bell, personal items and telephone in reach/Instruct patient to call for assistance before getting out of bed or chair/Non-slip footwear when patient is out of bed/[place] to call system/Physically safe environment - no spills, clutter or unnecessary equipment/Purposeful Proactive Rounding/Room/bathroom lighting operational, light cord in reach

## 2024-12-26 NOTE — ED ADULT NURSE NOTE - OBJECTIVE STATEMENT
pt BIBEMS c/o slurred speech, and worsening L sided facial droop. LKW 2:30 AM. pt with hx of R sided stroke. pt hypotensive and tachycardic en-route. . stroke code activated.

## 2024-12-27 ENCOUNTER — RESULT REVIEW (OUTPATIENT)
Age: 79
End: 2024-12-27

## 2024-12-27 ENCOUNTER — TRANSCRIPTION ENCOUNTER (OUTPATIENT)
Age: 79
End: 2024-12-27

## 2024-12-27 LAB
ALBUMIN SERPL ELPH-MCNC: 3.2 G/DL — LOW (ref 3.5–5.2)
ALP SERPL-CCNC: 94 U/L — SIGNIFICANT CHANGE UP (ref 30–115)
ALT FLD-CCNC: 7 U/L — SIGNIFICANT CHANGE UP (ref 0–41)
ANION GAP SERPL CALC-SCNC: 10 MMOL/L — SIGNIFICANT CHANGE UP (ref 7–14)
ANION GAP SERPL CALC-SCNC: 12 MMOL/L — SIGNIFICANT CHANGE UP (ref 7–14)
AST SERPL-CCNC: 11 U/L — SIGNIFICANT CHANGE UP (ref 0–41)
BASOPHILS # BLD AUTO: 0.03 K/UL — SIGNIFICANT CHANGE UP (ref 0–0.2)
BASOPHILS NFR BLD AUTO: 0.3 % — SIGNIFICANT CHANGE UP (ref 0–1)
BILIRUB SERPL-MCNC: 0.5 MG/DL — SIGNIFICANT CHANGE UP (ref 0.2–1.2)
BUN SERPL-MCNC: 41 MG/DL — HIGH (ref 10–20)
BUN SERPL-MCNC: 41 MG/DL — HIGH (ref 10–20)
CALCIUM SERPL-MCNC: 7.9 MG/DL — LOW (ref 8.4–10.4)
CALCIUM SERPL-MCNC: 8.5 MG/DL — SIGNIFICANT CHANGE UP (ref 8.4–10.4)
CHLORIDE SERPL-SCNC: 97 MMOL/L — LOW (ref 98–110)
CHLORIDE SERPL-SCNC: 99 MMOL/L — SIGNIFICANT CHANGE UP (ref 98–110)
CO2 SERPL-SCNC: 25 MMOL/L — SIGNIFICANT CHANGE UP (ref 17–32)
CO2 SERPL-SCNC: 26 MMOL/L — SIGNIFICANT CHANGE UP (ref 17–32)
CREAT SERPL-MCNC: 1.8 MG/DL — HIGH (ref 0.7–1.5)
CREAT SERPL-MCNC: 2.1 MG/DL — HIGH (ref 0.7–1.5)
EGFR: 31 ML/MIN/1.73M2 — LOW
EGFR: 37 ML/MIN/1.73M2 — LOW
EOSINOPHIL # BLD AUTO: 0.3 K/UL — SIGNIFICANT CHANGE UP (ref 0–0.7)
EOSINOPHIL NFR BLD AUTO: 2.7 % — SIGNIFICANT CHANGE UP (ref 0–8)
GLUCOSE SERPL-MCNC: 142 MG/DL — HIGH (ref 70–99)
GLUCOSE SERPL-MCNC: 175 MG/DL — HIGH (ref 70–99)
HCT VFR BLD CALC: 38 % — LOW (ref 42–52)
HGB BLD-MCNC: 11.7 G/DL — LOW (ref 14–18)
IMM GRANULOCYTES NFR BLD AUTO: 0.5 % — HIGH (ref 0.1–0.3)
LYMPHOCYTES # BLD AUTO: 1.38 K/UL — SIGNIFICANT CHANGE UP (ref 1.2–3.4)
LYMPHOCYTES # BLD AUTO: 12.6 % — LOW (ref 20.5–51.1)
MAGNESIUM SERPL-MCNC: 2.3 MG/DL — SIGNIFICANT CHANGE UP (ref 1.8–2.4)
MAGNESIUM SERPL-MCNC: 2.4 MG/DL — SIGNIFICANT CHANGE UP (ref 1.8–2.4)
MCHC RBC-ENTMCNC: 29.1 PG — SIGNIFICANT CHANGE UP (ref 27–31)
MCHC RBC-ENTMCNC: 30.8 G/DL — LOW (ref 32–37)
MCV RBC AUTO: 94.5 FL — HIGH (ref 80–94)
MONOCYTES # BLD AUTO: 0.93 K/UL — HIGH (ref 0.1–0.6)
MONOCYTES NFR BLD AUTO: 8.5 % — SIGNIFICANT CHANGE UP (ref 1.7–9.3)
NEUTROPHILS # BLD AUTO: 8.27 K/UL — HIGH (ref 1.4–6.5)
NEUTROPHILS NFR BLD AUTO: 75.4 % — HIGH (ref 42.2–75.2)
NRBC # BLD: 0 /100 WBCS — SIGNIFICANT CHANGE UP (ref 0–0)
PHOSPHATE SERPL-MCNC: 4.5 MG/DL — SIGNIFICANT CHANGE UP (ref 2.1–4.9)
PLATELET # BLD AUTO: 141 K/UL — SIGNIFICANT CHANGE UP (ref 130–400)
PMV BLD: 12.4 FL — HIGH (ref 7.4–10.4)
POTASSIUM SERPL-MCNC: 4.6 MMOL/L — SIGNIFICANT CHANGE UP (ref 3.5–5)
POTASSIUM SERPL-MCNC: 4.6 MMOL/L — SIGNIFICANT CHANGE UP (ref 3.5–5)
POTASSIUM SERPL-SCNC: 4.6 MMOL/L — SIGNIFICANT CHANGE UP (ref 3.5–5)
POTASSIUM SERPL-SCNC: 4.6 MMOL/L — SIGNIFICANT CHANGE UP (ref 3.5–5)
PROT SERPL-MCNC: 5.5 G/DL — LOW (ref 6–8)
RAPID RVP RESULT: SIGNIFICANT CHANGE UP
RBC # BLD: 4.02 M/UL — LOW (ref 4.7–6.1)
RBC # FLD: 13.9 % — SIGNIFICANT CHANGE UP (ref 11.5–14.5)
SARS-COV-2 RNA SPEC QL NAA+PROBE: SIGNIFICANT CHANGE UP
SODIUM SERPL-SCNC: 134 MMOL/L — LOW (ref 135–146)
SODIUM SERPL-SCNC: 135 MMOL/L — SIGNIFICANT CHANGE UP (ref 135–146)
TROPONIN T, HIGH SENSITIVITY RESULT: 152 NG/L — CRITICAL HIGH (ref 6–21)
TROPONIN T, HIGH SENSITIVITY RESULT: 172 NG/L — CRITICAL HIGH (ref 6–21)
TROPONIN T, HIGH SENSITIVITY RESULT: 173 NG/L — CRITICAL HIGH (ref 6–21)
WBC # BLD: 10.96 K/UL — HIGH (ref 4.8–10.8)
WBC # FLD AUTO: 10.96 K/UL — HIGH (ref 4.8–10.8)

## 2024-12-27 PROCEDURE — 99233 SBSQ HOSP IP/OBS HIGH 50: CPT

## 2024-12-27 PROCEDURE — 76770 US EXAM ABDO BACK WALL COMP: CPT | Mod: 26

## 2024-12-27 PROCEDURE — 93306 TTE W/DOPPLER COMPLETE: CPT | Mod: 26

## 2024-12-27 RX ORDER — CHLORHEXIDINE GLUCONATE 1.2 MG/ML
1 RINSE ORAL
Refills: 0 | Status: DISCONTINUED | OUTPATIENT
Start: 2024-12-27 | End: 2024-12-30

## 2024-12-27 RX ORDER — CEFTRIAXONE SODIUM 1 G/1
1000 INJECTION, POWDER, FOR SOLUTION INTRAMUSCULAR; INTRAVENOUS EVERY 24 HOURS
Refills: 0 | Status: DISCONTINUED | OUTPATIENT
Start: 2024-12-27 | End: 2024-12-30

## 2024-12-27 RX ADMIN — HEPARIN SODIUM 5000 UNIT(S): 1000 INJECTION, SOLUTION INTRAVENOUS; SUBCUTANEOUS at 17:28

## 2024-12-27 RX ADMIN — Medication 80 MILLIGRAM(S): at 17:28

## 2024-12-27 RX ADMIN — CHLORHEXIDINE GLUCONATE 1 APPLICATION(S): 1.2 RINSE ORAL at 17:28

## 2024-12-27 RX ADMIN — ATORVASTATIN CALCIUM 10 MILLIGRAM(S): 40 TABLET, FILM COATED ORAL at 22:38

## 2024-12-27 RX ADMIN — PANTOPRAZOLE 40 MILLIGRAM(S): 40 TABLET, DELAYED RELEASE ORAL at 05:14

## 2024-12-27 RX ADMIN — CEFTRIAXONE SODIUM 100 MILLIGRAM(S): 1 INJECTION, POWDER, FOR SOLUTION INTRAMUSCULAR; INTRAVENOUS at 11:04

## 2024-12-27 RX ADMIN — BUPROPION HYDROCHLORIDE 150 MILLIGRAM(S): 150 TABLET, EXTENDED RELEASE ORAL at 11:04

## 2024-12-27 RX ADMIN — Medication 80 MILLIGRAM(S): at 11:04

## 2024-12-27 RX ADMIN — Medication 81 MILLIGRAM(S): at 11:04

## 2024-12-27 RX ADMIN — HEPARIN SODIUM 5000 UNIT(S): 1000 INJECTION, SOLUTION INTRAVENOUS; SUBCUTANEOUS at 07:09

## 2024-12-27 NOTE — PROGRESS NOTE ADULT - SUBJECTIVE AND OBJECTIVE BOX
24H events:    Patient is a 79y old Male who presents with a chief complaint of SOB (26 Dec 2024 14:44)    Primary diagnosis of BOBBY (acute kidney injury)      NOTE IN PROGRESS    Today is hospital day 1d.   This morning patient was seen and examined at bedside, resting comfortably in bed.    No acute or major events overnight.    Code Status:    Family communication:  Contact date:  Name of person contacted:  Relationship to patient:  Communication details:  What matters most:    PAST MEDICAL & SURGICAL HISTORY  Pacemaker    Obesity    HTN (hypertension)    BPH (benign prostatic hyperplasia)    DVT (deep venous thrombosis)    Hypercholesterolemia    CAD (coronary artery disease)    CVA (cerebral vascular accident)    Pacemaker  2010 2017    S/P gastric bypass  2001    S/P primary angioplasty    S/P CABG (coronary artery bypass graft)      SOCIAL HISTORY:  Social History:      ALLERGIES:  No Known Allergies    MEDICATIONS:  STANDING MEDICATIONS  aspirin enteric coated 81 milliGRAM(s) Oral daily  atorvastatin 10 milliGRAM(s) Oral at bedtime  buPROPion XL (24-Hour) . 150 milliGRAM(s) Oral daily  cefTRIAXone   IVPB 1000 milliGRAM(s) IV Intermittent every 24 hours  furosemide   Injectable 80 milliGRAM(s) IV Push two times a day  heparin   Injectable 5000 Unit(s) SubCutaneous every 12 hours  pantoprazole    Tablet 40 milliGRAM(s) Oral before breakfast    PRN MEDICATIONS    VITALS:   T(F): 97.4  HR: 64  BP: 102/63  RR: 18  SpO2: 96%    PHYSICAL EXAM:  GENERAL: NAD, lying in bed comfortably  HEAD:  Atraumatic, Normocephalic  EYES: EOMI, PERRLA, conjunctiva and sclera clear  ENT: Moist mucous membranes  NECK: Supple, No JVD  CHEST/LUNG: Clear to auscultation bilaterally; No rales, rhonchi, wheezing, or rubs. Unlabored respirations  HEART: Regular rate and rhythm; No murmurs, rubs, or gallops  ABDOMEN: BSx4; Soft, nontender, nondistended  EXTREMITIES:  2+ Peripheral Pulses, brisk capillary refill. No clubbing, cyanosis, or edema  NERVOUS SYSTEM:  A&Ox3, no focal deficits   SKIN: No rashes or lesions    (  ) Indwelling Velázquez Catheter:   Date inserted:    Reason (  ) Critical illness     (  ) urinary retention    (  ) Accurate Ins/Outs Monitoring     (  ) CMO patient    (  ) Central Line:   Date inserted:  Location: (  ) Right IJ     (  ) Left IJ     (  ) Right Fem     (  ) Left Fem    (  ) SPC        (  ) pigtail       (  ) PEG tube       (  ) colostomy       (  ) jejunostomy  (  ) U-Dall    LABS:                        11.7   10.96 )-----------( 141      ( 27 Dec 2024 07:35 )             38.0     12-27    134[L]  |  99  |  41[H]  ----------------------------<  175[H]  4.6   |  25  |  2.1[H]    Ca    7.9[L]      27 Dec 2024 07:35  Phos  4.5     12-27  Mg     2.3     12-27    TPro  5.5[L]  /  Alb  3.2[L]  /  TBili  0.5  /  DBili  x   /  AST  11  /  ALT  7   /  AlkPhos  94  12-27    PT/INR - ( 26 Dec 2024 07:30 )   PT: 13.60 sec;   INR: 1.15 ratio         PTT - ( 26 Dec 2024 07:30 )  PTT:29.5 sec  Urinalysis Basic - ( 27 Dec 2024 07:35 )    Color: x / Appearance: x / SG: x / pH: x  Gluc: 175 mg/dL / Ketone: x  / Bili: x / Urobili: x   Blood: x / Protein: x / Nitrite: x   Leuk Esterase: x / RBC: x / WBC x   Sq Epi: x / Non Sq Epi: x / Bacteria: x            Urinalysis with Rflx Culture (collected 26 Dec 2024 09:27)          RADIOLOGY:  CXR  Xray Chest 1 View- PORTABLE-Urgent:   ACC: [id number] EXAM:  XR CHEST PORTABLE URGENT 1V   ORDERED BY: FEDERICO CHOWDHURY     PROCEDURE DATE:  12/26/2024          INTERPRETATION:  CLINICAL HISTORY / REASON FOR EXAM: Shortness of breath.    COMPARISON: Chest radiograph from January 28, 2024.    TECHNIQUE/POSITIONING: The patient is rotated. Low lung volumes. Single   image, AP chest radiograph.    FINDINGS:    SUPPORT DEVICES: None.    CARDIAC/MEDIASTINUM/HILUM: Poststernotomy.    LUNG PARENCHYMA/PLEURA: Mild pulmonary vascular congestion. No   pneumothorax.    SKELETON/SOFT TISSUES: Unchanged.      IMPRESSION:    Low lung volume. Mild pulmonary vascular congestion.    --- End of Report ---            LYNDON PARRY MD; Attending Radiologist  This document has been electronically signed. Dec 26 2024  8:43AM (12-26-24 @ 08:08)      CT  CT Angio Chest PE Protocol w/ IV Cont:   ACC: [id number] EXAM:  CT ANGIO CHEST PULM ART WAWIC   ORDERED BY: FEDERICO CHOWDHURY     PROCEDURE DATE:  12/26/2024          INTERPRETATION:  CLINICAL STATEMENT: Assess for pulmonary embolus.    TECHNIQUE: Multislice helical sections were obtainedfrom the thoracic   inlet to the lung bases during rapid administration of 70 cc Omnipaque   350 intravenous contrast using a CTA protocol. Thin sections were   reconstructed through the pulmonary vasculature. Coronal, sagittal and   MIP reformatted images are also submitted.    Comparison: CT chest 1/29/2024.    FINDINGS:    PULMONARY EMBOLUS: No central or segmental pulmonary embolus.    LUNGS, PLEURA, AIRWAYS: No debris within the tracheobronchial tree.   Bilateral areas of faint groundglass opacity. No pneumothorax or pleural   effusion. Areas of small atelectasis.    THORACIC NODES: No mediastinal, hilar or axillary lymphadenopathy. Right   thyroid gland nodule measuring 1.4 cm (series 4 image 6).    MEDIASTINUM/GREAT VESSELS: Prominent heart size. No gross pericardial   effusion. Dilated ascending thoracic aorta measuring 4 cm and dilated   main pulmonary artery measuring 4.6 cm (series 4 image 88). Coronary   artery and thoracic aortic calcification.    VISUALIZED UPPER ABDOMEN: Prominent spleen measuring 13.3 cm (series 4   measures 220). Cholelithiasis. Partially visualized left renal stone   versus less likely excreted contrast. Postsurgical changes of the stomach.    BONES/SOFT TISSUES: Post median sternotomy. Degenerative changes. Left   chest device.      IMPRESSION:    No CT evidence of acute pulmonary embolus.    Bilateral areas of faint groundglass opacities, are nonspecific.    Dilated ascending thoracic aorta measuring 4 cm.    Additional findings are detailed inthe body of the report.    --- End of Report ---            SHANI SALAS MD; Attending Radiologist  This document has been electronically signed. Dec 26 2024  8:48AM (12-26-24 @ 07:58)           24H events:    Patient is a 79y old Male who presents with a chief complaint of SOB (26 Dec 2024 14:44)    Primary diagnosis of BOBBY (acute kidney injury)      NOTE IN PROGRESS    Today is hospital day 1d.   This morning patient was seen and examined at bedside, resting comfortably in bed. Improved SOB, mental status AOx3.   No acute or major events overnight.    Code Status: Full    PAST MEDICAL & SURGICAL HISTORY  Pacemaker    Obesity    HTN (hypertension)    BPH (benign prostatic hyperplasia)    DVT (deep venous thrombosis)    Hypercholesterolemia    CAD (coronary artery disease)    CVA (cerebral vascular accident)    Pacemaker  2010 2017    S/P gastric bypass  2001    S/P primary angioplasty    S/P CABG (coronary artery bypass graft)      SOCIAL HISTORY:  Social History:      ALLERGIES:  No Known Allergies    MEDICATIONS:  STANDING MEDICATIONS  aspirin enteric coated 81 milliGRAM(s) Oral daily  atorvastatin 10 milliGRAM(s) Oral at bedtime  buPROPion XL (24-Hour) . 150 milliGRAM(s) Oral daily  cefTRIAXone   IVPB 1000 milliGRAM(s) IV Intermittent every 24 hours  furosemide   Injectable 80 milliGRAM(s) IV Push two times a day  heparin   Injectable 5000 Unit(s) SubCutaneous every 12 hours  pantoprazole    Tablet 40 milliGRAM(s) Oral before breakfast    PRN MEDICATIONS    VITALS:   T(F): 97.4  HR: 64  BP: 102/63  RR: 18  SpO2: 96%    PHYSICAL EXAM:  GENERAL: NAD, lying in bed comfortably  HEAD:  Atraumatic, Normocephalic  EYES: EOMI, PERRLA, conjunctiva and sclera clear  CHEST/LUNG: Clear to auscultation bilaterally; No rales, rhonchi, wheezing, or rubs. Unlabored respirations  HEART: Regular rate and rhythm; No murmurs, rubs, or gallops  ABDOMEN: BSx4; Soft, nontender, nondistended  EXTREMITIES:  2+ Peripheral Pulses, significant +2 pitting edema b/l LE, chronic skin changes on LE  NERVOUS SYSTEM:  A&Ox3, no focal deficits      LABS:                        11.7   10.96 )-----------( 141      ( 27 Dec 2024 07:35 )             38.0     12-27    134[L]  |  99  |  41[H]  ----------------------------<  175[H]  4.6   |  25  |  2.1[H]    Ca    7.9[L]      27 Dec 2024 07:35  Phos  4.5     12-27  Mg     2.3     12-27    TPro  5.5[L]  /  Alb  3.2[L]  /  TBili  0.5  /  DBili  x   /  AST  11  /  ALT  7   /  AlkPhos  94  12-27    PT/INR - ( 26 Dec 2024 07:30 )   PT: 13.60 sec;   INR: 1.15 ratio         PTT - ( 26 Dec 2024 07:30 )  PTT:29.5 sec  Urinalysis Basic - ( 27 Dec 2024 07:35 )    Color: x / Appearance: x / SG: x / pH: x  Gluc: 175 mg/dL / Ketone: x  / Bili: x / Urobili: x   Blood: x / Protein: x / Nitrite: x   Leuk Esterase: x / RBC: x / WBC x   Sq Epi: x / Non Sq Epi: x / Bacteria: x            Urinalysis with Rflx Culture (collected 26 Dec 2024 09:27)      RADIOLOGY:  CXR  Xray Chest 1 View- PORTABLE-Urgent:   ACC: [id number] EXAM:  XR CHEST PORTABLE URGENT 1V   ORDERED BY: FEDERICO CHOWDHURY     PROCEDURE DATE:  12/26/2024          INTERPRETATION:  CLINICAL HISTORY / REASON FOR EXAM: Shortness of breath.    COMPARISON: Chest radiograph from January 28, 2024.    TECHNIQUE/POSITIONING: The patient is rotated. Low lung volumes. Single   image, AP chest radiograph.    FINDINGS:    SUPPORT DEVICES: None.    CARDIAC/MEDIASTINUM/HILUM: Poststernotomy.    LUNG PARENCHYMA/PLEURA: Mild pulmonary vascular congestion. No   pneumothorax.    SKELETON/SOFT TISSUES: Unchanged.      IMPRESSION:    Low lung volume. Mild pulmonary vascular congestion.    --- End of Report ---            LYNDON PARRY MD; Attending Radiologist  This document has been electronically signed. Dec 26 2024  8:43AM (12-26-24 @ 08:08)      CT  CT Angio Chest PE Protocol w/ IV Cont:   ACC: [id number] EXAM:  CT ANGIO CHEST PULM ART WAWI   ORDERED BY: FEDERICO CHOWDHURY     PROCEDURE DATE:  12/26/2024          INTERPRETATION:  CLINICAL STATEMENT: Assess for pulmonary embolus.    TECHNIQUE: Multislice helical sections were obtainedfrom the thoracic   inlet to the lung bases during rapid administration of 70 cc Omnipaque   350 intravenous contrast using a CTA protocol. Thin sections were   reconstructed through the pulmonary vasculature. Coronal, sagittal and   MIP reformatted images are also submitted.    Comparison: CT chest 1/29/2024.    FINDINGS:    PULMONARY EMBOLUS: No central or segmental pulmonary embolus.    LUNGS, PLEURA, AIRWAYS: No debris within the tracheobronchial tree.   Bilateral areas of faint groundglass opacity. No pneumothorax or pleural   effusion. Areas of small atelectasis.    THORACIC NODES: No mediastinal, hilar or axillary lymphadenopathy. Right   thyroid gland nodule measuring 1.4 cm (series 4 image 6).    MEDIASTINUM/GREAT VESSELS: Prominent heart size. No gross pericardial   effusion. Dilated ascending thoracic aorta measuring 4 cm and dilated   main pulmonary artery measuring 4.6 cm (series 4 image 88). Coronary   artery and thoracic aortic calcification.    VISUALIZED UPPER ABDOMEN: Prominent spleen measuring 13.3 cm (series 4   measures 220). Cholelithiasis. Partially visualized left renal stone   versus less likely excreted contrast. Postsurgical changes of the stomach.    BONES/SOFT TISSUES: Post median sternotomy. Degenerative changes. Left   chest device.      IMPRESSION:    No CT evidence of acute pulmonary embolus.    Bilateral areas of faint groundglass opacities, are nonspecific.    Dilated ascending thoracic aorta measuring 4 cm.    Additional findings are detailed inthe body of the report.    --- End of Report ---            SHANI SALAS MD; Attending Radiologist  This document has been electronically signed. Dec 26 2024  8:48AM (12-26-24 @ 07:58)           24H events:    Patient is a 79y old Male who presents with a chief complaint of SOB (26 Dec 2024 14:44)    Primary diagnosis of BOBBY (acute kidney injury)      NOTE IN PROGRESS    Today is hospital day 1d.   This morning patient was seen and examined at bedside, resting comfortably in bed. Improved SOB, mental status AOx3.   No acute or major events overnight.    Code Status: Full    PAST MEDICAL & SURGICAL HISTORY  Pacemaker    Obesity    HTN (hypertension)    BPH (benign prostatic hyperplasia)    DVT (deep venous thrombosis)    Hypercholesterolemia    CAD (coronary artery disease)    CVA (cerebral vascular accident)    Pacemaker  2010 2017    S/P gastric bypass  2001    S/P primary angioplasty    S/P CABG (coronary artery bypass graft)      SOCIAL HISTORY:  Social History:      ALLERGIES:  No Known Allergies    MEDICATIONS:  STANDING MEDICATIONS  aspirin enteric coated 81 milliGRAM(s) Oral daily  atorvastatin 10 milliGRAM(s) Oral at bedtime  buPROPion XL (24-Hour) . 150 milliGRAM(s) Oral daily  cefTRIAXone   IVPB 1000 milliGRAM(s) IV Intermittent every 24 hours  furosemide   Injectable 80 milliGRAM(s) IV Push two times a day  heparin   Injectable 5000 Unit(s) SubCutaneous every 12 hours  pantoprazole    Tablet 40 milliGRAM(s) Oral before breakfast    PRN MEDICATIONS    VITALS:   T(F): 97.4  HR: 64  BP: 102/63  RR: 18  SpO2: 96%    PHYSICAL EXAM:  GENERAL: NAD, lying in bed comfortably  CHEST/LUNG: b/l crackles   HEART: Regular rate and rhythm; No murmurs, rubs, or gallops  ABDOMEN: BSx4; Soft, nontender, nondistended  EXTREMITIES:  2+ Peripheral Pulses, significant +2 pitting edema b/l LE, chronic skin changes on LE  NERVOUS SYSTEM:  A&Ox3, no focal deficits      LABS:                        11.7   10.96 )-----------( 141      ( 27 Dec 2024 07:35 )             38.0     12-27    134[L]  |  99  |  41[H]  ----------------------------<  175[H]  4.6   |  25  |  2.1[H]    Ca    7.9[L]      27 Dec 2024 07:35  Phos  4.5     12-27  Mg     2.3     12-27    TPro  5.5[L]  /  Alb  3.2[L]  /  TBili  0.5  /  DBili  x   /  AST  11  /  ALT  7   /  AlkPhos  94  12-27    PT/INR - ( 26 Dec 2024 07:30 )   PT: 13.60 sec;   INR: 1.15 ratio         PTT - ( 26 Dec 2024 07:30 )  PTT:29.5 sec  Urinalysis Basic - ( 27 Dec 2024 07:35 )    Color: x / Appearance: x / SG: x / pH: x  Gluc: 175 mg/dL / Ketone: x  / Bili: x / Urobili: x   Blood: x / Protein: x / Nitrite: x   Leuk Esterase: x / RBC: x / WBC x   Sq Epi: x / Non Sq Epi: x / Bacteria: x            Urinalysis with Rflx Culture (collected 26 Dec 2024 09:27)      RADIOLOGY:  CXR  Xray Chest 1 View- PORTABLE-Urgent:   ACC: [id number] EXAM:  XR CHEST PORTABLE URGENT 1V   ORDERED BY: FEDERICO CHOWDHURY     PROCEDURE DATE:  12/26/2024          INTERPRETATION:  CLINICAL HISTORY / REASON FOR EXAM: Shortness of breath.    COMPARISON: Chest radiograph from January 28, 2024.    TECHNIQUE/POSITIONING: The patient is rotated. Low lung volumes. Single   image, AP chest radiograph.    FINDINGS:    SUPPORT DEVICES: None.    CARDIAC/MEDIASTINUM/HILUM: Poststernotomy.    LUNG PARENCHYMA/PLEURA: Mild pulmonary vascular congestion. No   pneumothorax.    SKELETON/SOFT TISSUES: Unchanged.      IMPRESSION:    Low lung volume. Mild pulmonary vascular congestion.    --- End of Report ---            LYNDON PARRY MD; Attending Radiologist  This document has been electronically signed. Dec 26 2024  8:43AM (12-26-24 @ 08:08)      CT  CT Angio Chest PE Protocol w/ IV Cont:   ACC: [id number] EXAM:  CT ANGIO CHEST PULM ART WAWI   ORDERED BY: FEDERICO CHOWDHURY     PROCEDURE DATE:  12/26/2024          INTERPRETATION:  CLINICAL STATEMENT: Assess for pulmonary embolus.    TECHNIQUE: Multislice helical sections were obtainedfrom the thoracic   inlet to the lung bases during rapid administration of 70 cc Omnipaque   350 intravenous contrast using a CTA protocol. Thin sections were   reconstructed through the pulmonary vasculature. Coronal, sagittal and   MIP reformatted images are also submitted.    Comparison: CT chest 1/29/2024.    FINDINGS:    PULMONARY EMBOLUS: No central or segmental pulmonary embolus.    LUNGS, PLEURA, AIRWAYS: No debris within the tracheobronchial tree.   Bilateral areas of faint groundglass opacity. No pneumothorax or pleural   effusion. Areas of small atelectasis.    THORACIC NODES: No mediastinal, hilar or axillary lymphadenopathy. Right   thyroid gland nodule measuring 1.4 cm (series 4 image 6).    MEDIASTINUM/GREAT VESSELS: Prominent heart size. No gross pericardial   effusion. Dilated ascending thoracic aorta measuring 4 cm and dilated   main pulmonary artery measuring 4.6 cm (series 4 image 88). Coronary   artery and thoracic aortic calcification.    VISUALIZED UPPER ABDOMEN: Prominent spleen measuring 13.3 cm (series 4   measures 220). Cholelithiasis. Partially visualized left renal stone   versus less likely excreted contrast. Postsurgical changes of the stomach.    BONES/SOFT TISSUES: Post median sternotomy. Degenerative changes. Left   chest device.      IMPRESSION:    No CT evidence of acute pulmonary embolus.    Bilateral areas of faint groundglass opacities, are nonspecific.    Dilated ascending thoracic aorta measuring 4 cm.    Additional findings are detailed inthe body of the report.    --- End of Report ---            SHANI SALAS MD; Attending Radiologist  This document has been electronically signed. Dec 26 2024  8:48AM (12-26-24 @ 07:58)

## 2024-12-27 NOTE — DISCHARGE NOTE NURSING/CASE MANAGEMENT/SOCIAL WORK - PATIENT PORTAL LINK FT
You can access the FollowMyHealth Patient Portal offered by [facility] by registering at the following website: [email or web address]. By joining [facility]’s FollowMyHealth portal, you will also be able to view your health information using other applications (apps) compatible with our system.

## 2024-12-27 NOTE — CHART NOTE - NSCHARTNOTEFT_GEN_A_CORE
Pacemaker: Medtronic  Generator: [id number]  RA Lead: [id number]  RV Lead: [id number]  MRI NOT COMPATIBLE Pacemaker: Medtronic  Generator: [id number]  RA Lead: [id number]  RV Lead: [id number]  MRI NOT COMPATIBLE due to 2009 leads being incompatible

## 2024-12-27 NOTE — DISCHARGE NOTE NURSING/CASE MANAGEMENT/SOCIAL WORK - FINANCIAL ASSISTANCE
[facility] provides services at a reduced cost to those who are determined to be eligible through [facility]’s financial assistance program. Information regarding [facility]’s financial assistance program can be found by going to [email or web address] or by calling [phone].

## 2024-12-27 NOTE — PROGRESS NOTE ADULT - ASSESSMENT
78 yo m with pmh of htn, hld, bph, cad/pad on ASA brought in by EMS for slurred speech now back to baseline. Patient also reported sob and leg swelling for the past months. Found to be hypoxic and in bobby. Admitted for further management.    #Acute hypoxic respiratory failure likely 2/2 chf  #Elevated trops  #CAD s/p cabg and RCA  stent  #Complete Heart Block s/p PPM  - Patient reports sob and LE edema for the past month. Denies chest pain  - Not on home O2  - Currently on 2l nc  - [facility] ED, normal EF, no pericardial effusion  - Xray chest: Mild pulmonary vascular congestion. No opacities  - On lasix at home, no previous echo on file, per patient did not have an echo recently  - trop 83, proBNP 3344  - EKG no ischemic changes  - check echo  - Trend trops  - Start lasix 80mg IV bid  - Cardio eval  - Admit telemetry    #Sepsis POA  - Labs: WBC 19K, k 5.8, bun 39, creat 2.3, gfr 28, trop 83, proBNP 3344  - VBG: ph 7.29, lactate 1, c02 55, bicarb 26  - UA positive, wbc 270, moderate LE  - Xray chest: Mild pulmonary vascular congestion. No opacities  - Send rvp  - c/w cefepime and vanc for now   - f/u bcx, ucx and procal    #BOBBY - likely cardio-renal  #Hyperkalemia  - creatinine 2.3 (baseline 1)  - check urine studies  - check renal U/S   - c/w lasix  - Strict I&Os  - Monitor bmp bid    #Slurred speech and facial droop/ TIA? - back to baseline  - Per ED, patient reporting slurred speech and facial droop  - CT PERFUSION: No perfusion deficits to suggest areas of completed infarction or at risk territory.  - CTA HEAD/NECK: No large vessel occlusion, aneurysm, or vascular malformation. Moderate-severe atherosclerotic stenosis and bilateral proximal ICAs (approximately 70%).Focal severe stenosis in the proximalV1 segment of the left vertebral artery.  - CTA PE: No CT evidence of acute pulmonary embolus. Bilateral areas of faint groundglass opacities, are nonspecific. Dilated ascending thoracic aorta measuring 4 cm. Additional findings are detailed inthe body of the report.  - Stoke code called, patient is outside the appropriate time window for thrombolytic therapy and back to baseline not a candidate for IV or IA acute stroke intervention.  - On my encounter, patient at baseline, denies any neurologic symptoms  - Follow up neurology    #BPH?  - Bladder scan    #HTN  - BP borderline  - hold lisinopril in the setting of BOBBY    #HLD  #PAD  - continue aspirin & statin    DIET: DASH  DVT prophylaxis: heparin  CODE: full 78 yo m with pmh of htn, hld, bph, cad/pad on ASA brought in by EMS for slurred speech now back to baseline. Patient also reported sob and leg swelling for the past months. Found to be hypoxic and in bobby. Admitted for further management.    #Acute hypoxic respiratory failure likely 2/2 chf  #Elevated trops  #CAD s/p cabg and RCA  stent  #Complete Heart Block s/p PPM  - Patient reports sob and LE edema for the past month. Denies chest pain  - Currently on 3L nc, saturating well  - [facility] ED, normal EF, no pericardial effusion  - Xray chest: Mild pulmonary vascular congestion. No opacities  - On lasix at home, no previous echo on file, per patient did not have an echo recently  - EKG no ischemic changes  - f/u TTE  - Trops uptrending, but stabilizing. Trend trops to peak, next trop @4PM 12/27  - C/w lasix 80mg IV bid  - Cardio eval  - Wean O2 requirements as tolerated, baseline on room air at home.     #Sepsis POA  - Labs: WBC 19K, k 5.8, bun 39, creat 2.3, gfr 28, trop 83, proBNP 3344  - VBG: ph 7.29, lactate 1, c02 55, bicarb 26  - UA positive, wbc 270, moderate LE  - Xray chest: Mild pulmonary vascular congestion. No opacities  - Send rvp  - Switch abx to ceftriaxone 1g IV qD for treatment of UTI  - f/u bcx, ucx and procal    #BOBBY - likely cardio-renal  #Hyperkalemia  - Trend Cr, today is 2.1 downtrending (baseline 1)  - F/u urine studies  - F/u on US kidney and bladder  - c/w lasix 80 BID for now  - Strict I&Os  - Monitor bmp bid    #Slurred speech and facial droop/ TIA? - back to baseline  - Per ED, patient reporting slurred speech and facial droop  - CT PERFUSION: No perfusion deficits to suggest areas of completed infarction or at risk territory.  - CTA HEAD/NECK: No large vessel occlusion, aneurysm, or vascular malformation. Moderate-severe atherosclerotic stenosis and bilateral proximal ICAs (approximately 70%).Focal severe stenosis in the proximalV1 segment of the left vertebral artery.  - CTA PE: No CT evidence of acute pulmonary embolus. Bilateral areas of faint groundglass opacities, are nonspecific. Dilated ascending thoracic aorta measuring 4 cm. Additional findings are detailed inthe body of the report.  - Stoke code called, patient is outside the appropriate time window for thrombolytic therapy and back to baseline not a candidate for IV or IA acute stroke intervention.  - On my encounter, patient at baseline, denies any neurologic symptoms  - A&Ox3 today, no neurologic deficits  - Unable to have MRI head due to incompatible AICD leads  - Follow up with neurology if further imaging is needed.     #BPH  - not on meds    #HTN  - BP borderline  - holding 10mg lisinopril qD in the setting of BOBBY, consider resuming once BOBBY resolving. BP currently on softer side.     #HLD  #PAD  - continue aspirin & statin    DVT: Heparin subQ  Diet: DASH/TLC  Activity: AAT  GI: none  Code: full    To-do: BMP BID for lasix 80 BID, f/u 4PM trop until peak, f/u US kidney bladder, f/u TTE, f/u any neuro recs, wean O2   80 yo m with pmh of htn, hld, bph, cad/pad on ASA brought in by EMS for slurred speech now back to baseline. Patient also reported sob and leg swelling for the past months. Found to be hypoxic and in bobby. Admitted for further management.    #Acute hypoxic respiratory failure likely 2/2 Decompensated heart failure. unsure of subtype at this time   #Elevated trops  #CAD s/p cabg and RCA  stent  #Complete Heart Block s/p PPM  - Patient reports sob and LE edema for the past month. Denies chest pain  - Currently on 3L nc, saturating well will wean off   - [facility] ED, normal EF, no pericardial effusion  - Xray chest: Mild pulmonary vascular congestion. No opacities  - On lasix at home, no previous echo on file, per patient did not have an echo recently  - EKG no ischemic changes  - f/u TTE- depending on findings may need ischemic workup.   - Trops uptrending, but stabilizing. Trend trops to peak, next trop @4PM 12/27  - C/w lasix 80mg IV bid  - Cardio eval  - Wean O2 requirements as tolerated, baseline on room air at home.     #Sepsis POA  - Labs: WBC 19K, k 5.8, bun 39, creat 2.3, gfr 28, trop 83, proBNP 3344  - VBG: ph 7.29, lactate 1, c02 55, bicarb 26  - UA positive, wbc 270, moderate LE  - Xray chest: Mild pulmonary vascular congestion. No opacities  - Send rvp  - Switch abx to ceftriaxone 1g IV qD for treatment of UTI  - f/u bcx, ucx and procal    #BOBBY - likely cardio-renal  #Hyperkalemia  - Trend Cr, today is 2.1 downtrending (baseline 1)  - F/u urine studies  - F/u on US kidney and bladder  - c/w lasix 80 BID for now  - Strict I&Os  - Monitor bmp bid    #Slurred speech and facial droop/ TIA? - back to baseline  - Per ED, patient reporting slurred speech and facial droop  - CT PERFUSION: No perfusion deficits to suggest areas of completed infarction or at risk territory.  - CTA HEAD/NECK: No large vessel occlusion, aneurysm, or vascular malformation. Moderate-severe atherosclerotic stenosis and bilateral proximal ICAs (approximately 70%).Focal severe stenosis in the proximalV1 segment of the left vertebral artery.  - CTA PE: No CT evidence of acute pulmonary embolus. Bilateral areas of faint groundglass opacities, are nonspecific. Dilated ascending thoracic aorta measuring 4 cm. Additional findings are detailed inthe body of the report.  - Stoke code called, patient is outside the appropriate time window for thrombolytic therapy and back to baseline not a candidate for IV or IA acute stroke intervention.  - On my encounter, patient at baseline, denies any neurologic symptoms  - A&Ox3 today, no neurologic deficits  - Unable to have MRI head due to incompatible AICD leads  - Follow up with neurology if further imaging is needed.     #BPH  - not on meds    #HTN  - BP borderline  - holding 10mg lisinopril qD in the setting of BOBBY, consider resuming once BOBBY resolving. BP currently on softer side.     #HLD  #PAD  - continue aspirin & statin    DVT: Heparin subQ  Diet: DASH/TLC  Activity: AAT  GI: none  Code: full    To-do: BMP BID for lasix 80 BID, f/u 4PM trop until peak, f/u US kidney bladder, f/u TTE, f/u any neuro recs, wean O2

## 2024-12-27 NOTE — DISCHARGE NOTE NURSING/CASE MANAGEMENT/SOCIAL WORK - NURSING SECTION COMPLETE
[Time Spent: ___ minutes] : I have spent [unfilled] minutes of time on the encounter.
Patient/Caregiver provided printed discharge information.

## 2024-12-28 LAB
ANION GAP SERPL CALC-SCNC: 13 MMOL/L — SIGNIFICANT CHANGE UP (ref 7–14)
ANION GAP SERPL CALC-SCNC: 16 MMOL/L — HIGH (ref 7–14)
BLD GP AB SCN SERPL QL: SIGNIFICANT CHANGE UP
BUN SERPL-MCNC: 39 MG/DL — HIGH (ref 10–20)
BUN SERPL-MCNC: 41 MG/DL — HIGH (ref 10–20)
CALCIUM SERPL-MCNC: 8.6 MG/DL — SIGNIFICANT CHANGE UP (ref 8.4–10.4)
CALCIUM SERPL-MCNC: 9 MG/DL — SIGNIFICANT CHANGE UP (ref 8.4–10.5)
CHLORIDE SERPL-SCNC: 100 MMOL/L — SIGNIFICANT CHANGE UP (ref 98–110)
CHLORIDE SERPL-SCNC: 99 MMOL/L — SIGNIFICANT CHANGE UP (ref 98–110)
CO2 SERPL-SCNC: 26 MMOL/L — SIGNIFICANT CHANGE UP (ref 17–32)
CO2 SERPL-SCNC: 29 MMOL/L — SIGNIFICANT CHANGE UP (ref 17–32)
CREAT SERPL-MCNC: 1.6 MG/DL — HIGH (ref 0.7–1.5)
CREAT SERPL-MCNC: 1.7 MG/DL — HIGH (ref 0.7–1.5)
EGFR: 40 ML/MIN/1.73M2 — LOW
EGFR: 43 ML/MIN/1.73M2 — LOW
GLUCOSE SERPL-MCNC: 107 MG/DL — HIGH (ref 70–99)
GLUCOSE SERPL-MCNC: 89 MG/DL — SIGNIFICANT CHANGE UP (ref 70–99)
MAGNESIUM SERPL-MCNC: 2.2 MG/DL — SIGNIFICANT CHANGE UP (ref 1.8–2.4)
MAGNESIUM SERPL-MCNC: 2.3 MG/DL — SIGNIFICANT CHANGE UP (ref 1.8–2.4)
POTASSIUM SERPL-MCNC: 4.5 MMOL/L — SIGNIFICANT CHANGE UP (ref 3.5–5)
POTASSIUM SERPL-MCNC: 4.9 MMOL/L — SIGNIFICANT CHANGE UP (ref 3.5–5)
POTASSIUM SERPL-SCNC: 4.5 MMOL/L — SIGNIFICANT CHANGE UP (ref 3.5–5)
POTASSIUM SERPL-SCNC: 4.9 MMOL/L — SIGNIFICANT CHANGE UP (ref 3.5–5)
PROCALCITONIN SERPL-MCNC: 0.19 NG/ML — HIGH (ref 0.02–0.1)
SODIUM SERPL-SCNC: 141 MMOL/L — SIGNIFICANT CHANGE UP (ref 135–146)
SODIUM SERPL-SCNC: 142 MMOL/L — SIGNIFICANT CHANGE UP (ref 135–146)
TROPONIN T, HIGH SENSITIVITY RESULT: 228 NG/L — CRITICAL HIGH (ref 6–21)
TROPONIN T, HIGH SENSITIVITY RESULT: 262 NG/L — CRITICAL HIGH (ref 6–21)

## 2024-12-28 PROCEDURE — 99233 SBSQ HOSP IP/OBS HIGH 50: CPT

## 2024-12-28 RX ADMIN — BUPROPION HYDROCHLORIDE 150 MILLIGRAM(S): 150 TABLET, EXTENDED RELEASE ORAL at 14:11

## 2024-12-28 RX ADMIN — Medication 80 MILLIGRAM(S): at 14:06

## 2024-12-28 RX ADMIN — CEFTRIAXONE SODIUM 100 MILLIGRAM(S): 1 INJECTION, POWDER, FOR SOLUTION INTRAMUSCULAR; INTRAVENOUS at 14:07

## 2024-12-28 RX ADMIN — Medication 81 MILLIGRAM(S): at 14:06

## 2024-12-28 RX ADMIN — CHLORHEXIDINE GLUCONATE 1 APPLICATION(S): 1.2 RINSE ORAL at 10:28

## 2024-12-28 RX ADMIN — ATORVASTATIN CALCIUM 10 MILLIGRAM(S): 40 TABLET, FILM COATED ORAL at 21:06

## 2024-12-28 RX ADMIN — PANTOPRAZOLE 40 MILLIGRAM(S): 40 TABLET, DELAYED RELEASE ORAL at 06:12

## 2024-12-28 RX ADMIN — HEPARIN SODIUM 5000 UNIT(S): 1000 INJECTION, SOLUTION INTRAVENOUS; SUBCUTANEOUS at 06:12

## 2024-12-28 RX ADMIN — Medication 80 MILLIGRAM(S): at 06:11

## 2024-12-28 RX ADMIN — HEPARIN SODIUM 5000 UNIT(S): 1000 INJECTION, SOLUTION INTRAVENOUS; SUBCUTANEOUS at 18:07

## 2024-12-28 NOTE — PROGRESS NOTE ADULT - SUBJECTIVE AND OBJECTIVE BOX
CHIEF COMPLAINT:    Patient is a 79y old  Male who presents with a chief complaint of Acute renal failure      INTERVAL HPI/OVERNIGHT EVENTS:    Patient seen and examined at bedside. No acute overnight events occurred.    ROS: Denies SOBAll other systems are negative.    Medications:  Standing  aspirin enteric coated 81 milliGRAM(s) Oral daily  atorvastatin 10 milliGRAM(s) Oral at bedtime  buPROPion XL (24-Hour) . 150 milliGRAM(s) Oral daily  cefTRIAXone   IVPB 1000 milliGRAM(s) IV Intermittent every 24 hours  chlorhexidine 2% Cloths 1 Application(s) Topical <User Schedule>  furosemide   Injectable 80 milliGRAM(s) IV Push two times a day  heparin   Injectable 5000 Unit(s) SubCutaneous every 12 hours  pantoprazole    Tablet 40 milliGRAM(s) Oral before breakfast    PRN Meds        Vital Signs:    T(F): 97.5 (12-28-24 @ 12:39), Max: 98.5 (12-27-24 @ 20:20)  HR: 72 (12-28-24 @ 12:39) (59 - 72)  BP: 107/62 (12-28-24 @ 12:39) (107/62 - 116/67)  RR: 17 (12-28-24 @ 12:39) (17 - 17)  SpO2: 93% (12-28-24 @ 05:27) (93% - 93%)  I&O's Summary    27 Dec 2024 07:01  -  28 Dec 2024 07:00  --------------------------------------------------------  IN: 120 mL / OUT: 1104 mL / NET: -984 mL    28 Dec 2024 07:01  -  28 Dec 2024 15:46  --------------------------------------------------------  IN: 0 mL / OUT: 450 mL / NET: -450 mL        PHYSICAL EXAM:  GENERAL:  NAD  SKIN: No rashes or lesions  HEENT: Atraumatic. Normocephalic. Anicteric  NECK:  No JVD.   PULMONARY: Clear to ausculation bilaterally. No wheezing. No rales  CVS: Normal S1, S2. Regular rate and rhythm. No murmurs.  ABDOMEN/GI: Soft, Nontender, Nondistended; Bowel sounds are present  EXTREMITIES:  No edema B/L LE.  NEUROLOGIC:  No motor deficit.  PSYCH: Alert & oriented x 3, normal affect      LABS:                        11.7   10.96 )-----------( 141      ( 27 Dec 2024 07:35 )             38.0     12-28    142  |  100  |  39[H]  ----------------------------<  89  4.5   |  26  |  1.7[H]    Ca    9.0      28 Dec 2024 08:25  Phos  4.5     12-27  Mg     2.3     12-28    TPro  5.5[L]  /  Alb  3.2[L]  /  TBili  0.5  /  DBili  x   /  AST  11  /  ALT  7   /  AlkPhos  94  12-27            Urinalysis with Rflx Culture (collected 26 Dec 2024 09:27)    Culture - Urine (collected 26 Dec 2024 09:27)  Source: Clean Catch None  Final Report (27 Dec 2024 16:53):    <10,000 CFU/mL Normal Urogenital Faye    Culture - Blood (collected 26 Dec 2024 07:30)  Source: .Blood BLOOD  Preliminary Report (28 Dec 2024 13:01):    No growth at 48 Hours    Culture - Blood (collected 26 Dec 2024 07:30)  Source: .Blood BLOOD  Preliminary Report (28 Dec 2024 13:01):    No growth at 48 Hours        RADIOLOGY & ADDITIONAL TESTS:  Imaging or report Personally Reviewed:  [ ] YES  [ ] NO -->no new images    Telemetry reviewed independently - NSR, no acute events  EKG reviewed independently -->no new EKGs    Consultant(s) Notes Reviewed:  [ ] YES  [ ] NO  Care Discussed with Consultants/Other Providers [ ] YES  [ ] NO    Case discussed with resident  Care discussed with pt

## 2024-12-28 NOTE — DIETITIAN INITIAL EVALUATION ADULT - PERTINENT MEDS FT
MEDICATIONS  (STANDING):  aspirin enteric coated 81 milliGRAM(s) Oral daily  atorvastatin 10 milliGRAM(s) Oral at bedtime  buPROPion XL (24-Hour) . 150 milliGRAM(s) Oral daily  cefTRIAXone   IVPB 1000 milliGRAM(s) IV Intermittent every 24 hours  chlorhexidine 2% Cloths 1 Application(s) Topical <User Schedule>  furosemide   Injectable 80 milliGRAM(s) IV Push two times a day  heparin   Injectable 5000 Unit(s) SubCutaneous every 12 hours  pantoprazole    Tablet 40 milliGRAM(s) Oral before breakfast    MEDICATIONS  (PRN):

## 2024-12-28 NOTE — DIETITIAN INITIAL EVALUATION ADULT - ORAL INTAKE PTA/DIET HISTORY
Pt is [place] speaking.  #[id number] assisted. PTA pt reports a normal appetite consuming 100% 2-3 meals/day. Pt does not endorse a special diet. NKFA. Pt reports usual body wt is 129.5kg, current body wt 122.8kg, a 5.2% change in 1 month.

## 2024-12-28 NOTE — PROGRESS NOTE ADULT - ASSESSMENT
80 yo M PMHx HTN, DLD, CAD prsesented for evaluation of for slurred speech. Patient also reported sob and leg swelling for the past months. Found to be hypoxic and have BOBBY. Admitted for further management.    Acute hypoxic respiratory failure  likely 2/2 Decompensated heart failure  CAD s/p cabg and RCA  stent  Complete Heart Block s/p PPM  - echo showed grade 1 diastolic dysfunction, severe aortic stenosis   - troponin uptrending to 221 today. No chest pain. Cardio follow up requested  - c/w IV diuresis  - ecg showed AV pacing      Sepsis criteria met on admission due to elevated WBC and   - no obvious source of infection  - UA positive, wbc 270, moderate LE  - Xray chest: Mild pulmonary vascular congestion. No opacities  - Send rvp  - was treated for sepsis from UTI  - completed rocephin  - no other obvious source of infection    BOBBY - likely cardio-renal  - SCr downtrended to 1.7 today  - c/w lasix  - pt remains volume overloaded      Slurred speech and facial droop  - possibly from TIA? - back to baseline  - Per ED, patient reporting slurred speech and facial droop  - CT PERFUSION: No perfusion deficits to suggest areas of completed infarction or at risk territory.  - CTA HEAD/NECK: No large vessel occlusion, aneurysm, or vascular malformation. Moderate-severe atherosclerotic stenosis and bilateral proximal ICAs (approximately 70%).Focal severe stenosis in the proximalV1 segment of the left vertebral artery.  - CTA PE: No CT evidence of acute pulmonary embolus. Bilateral areas of faint ground glass opacities, are nonspecific. Dilated ascending thoracic aorta measuring 4 cm. Additional findings are detailed inthe body of the report.  - Stoke code called, patient is outside the appropriate time window for thrombolytic therapy and back to baseline not a candidate for IV or IA acute stroke intervention.  - On my encounter, patient at baseline, denies any neurologic symptoms  - A&Ox3 today, no neurologic deficits  - Unable to have MRI head due to incompatible AICD leads  - I reached out to neuro on call to discuss further recs--awaiting response    BPH  - not on meds  - outpt eurology    HTN  - BP borderline  - holding 10mg lisinopril qD in the setting of BOBBY, consider resuming once BOBBY resolving. BP currently on softer side.     HLD  PAD  - continue aspirin & statin    DVT: Heparin subQ  Diet: DASH/TLC  Activity: AAT  GI: none  Code: full    #Progress Note Handoff:  Pending (specify): Neuro follow up  Family discussion: As above with pt  Disposition: Home_x__/SNF___/Other________/Unknown at this time________   80 yo M PMHx HTN, DLD, CAD prsesented for evaluation of for slurred speech. Patient also reported sob and leg swelling for the past months. Found to be hypoxic and have BOBBY. Admitted for further management.    Acute hypoxic respiratory failure  likely 2/2 Decompensated heart failure  CAD s/p cabg and RCA  stent  Complete Heart Block s/p PPM  - echo showed grade 1 diastolic dysfunction, severe aortic stenosis   - troponin uptrending to 221 today. No chest pain. Cardio follow up requested- I spoke with cardio team--to follow up  - c/w IV diuresis  - ecg showed AV pacing      Sepsis criteria met on admission due to elevated WBC and   - no obvious source of infection  - UA positive, wbc 270, moderate LE  - Xray chest: Mild pulmonary vascular congestion. No opacities  - Send rvp  - was treated for sepsis from UTI  - completed rocephin  - no other obvious source of infection    BOBBY - likely cardio-renal  - SCr downtrended to 1.7 today  - c/w lasix  - pt remains volume overloaded      Slurred speech and facial droop  - possibly from TIA? - back to baseline  - Per ED, patient reporting slurred speech and facial droop  - CT PERFUSION: No perfusion deficits to suggest areas of completed infarction or at risk territory.  - CTA HEAD/NECK: No large vessel occlusion, aneurysm, or vascular malformation. Moderate-severe atherosclerotic stenosis and bilateral proximal ICAs (approximately 70%).Focal severe stenosis in the proximalV1 segment of the left vertebral artery.  - CTA PE: No CT evidence of acute pulmonary embolus. Bilateral areas of faint ground glass opacities, are nonspecific. Dilated ascending thoracic aorta measuring 4 cm. Additional findings are detailed inthe body of the report.  - Stoke code called, patient is outside the appropriate time window for thrombolytic therapy and back to baseline not a candidate for IV or IA acute stroke intervention.  - On my encounter, patient at baseline, denies any neurologic symptoms  - A&Ox3 today, no neurologic deficits  - Unable to have MRI head due to incompatible AICD leads  - I reached out to neuro on call to discuss further recs--awaiting response    BPH  - not on meds  - outpt eurology    HTN  - BP borderline  - holding 10mg lisinopril qD in the setting of BOBBY, consider resuming once BOBBY resolving. BP currently on softer side.     HLD  PAD  - continue aspirin & statin    DVT: Heparin subQ  Diet: DASH/TLC  Activity: AAT  GI: none  Code: full    #Progress Note Handoff:  Pending (specify): Neuro follow up  Family discussion: As above with pt  Disposition: Home_x__/SNF___/Other________/Unknown at this time________

## 2024-12-28 NOTE — DIETITIAN INITIAL EVALUATION ADULT - NSFNSGIIOFT_GEN_A_CORE
12-27-24 @ 07:01  -  12-28-24 @ 07:00  --------------------------------------------------------  OUT:    Stool (mL): 4 mL  Total OUT: 4 mL    Total NET: -4 mL

## 2024-12-28 NOTE — CHART NOTE - NSCHARTNOTEFT_GEN_A_CORE
The pt PPM is not MRI compatible. Clinically stable. On ASA and statins.     Suggestion:     - Please repeat CTH non-con if stable, can change Neurochecks q8h  - continue rest of the recommendations suggested on previous neurology consult note  - Continue primary team w/up and tx.     Discussed w attending Dr. Hart

## 2024-12-28 NOTE — CHART NOTE - NSCHARTNOTEFT_GEN_A_CORE
Consult received for "MST Score = />2." Upon chart review and assessment, patient with stable weight, patient reported PO intake %. No N/V/D/C. Patient does not currently meet criteria for Protein Calorie Malnutrition risk per MST. RD remains available for assessment per protocol or as needed.    Marcia Serna, AMEE   [phone] or via Teams

## 2024-12-28 NOTE — DIETITIAN INITIAL EVALUATION ADULT - PERTINENT LABORATORY DATA
12-27    135  |  97[L]  |  41[H]  ----------------------------<  142[H]  4.6   |  26  |  1.8[H]    Ca    8.5      27 Dec 2024 16:00  Phos  4.5     12-27  Mg     2.4     12-27    TPro  5.5[L]  /  Alb  3.2[L]  /  TBili  0.5  /  DBili  x   /  AST  11  /  ALT  7   /  AlkPhos  94  12-27

## 2024-12-29 LAB
ANION GAP SERPL CALC-SCNC: 14 MMOL/L — SIGNIFICANT CHANGE UP (ref 7–14)
BUN SERPL-MCNC: 39 MG/DL — HIGH (ref 10–20)
CALCIUM SERPL-MCNC: 9.7 MG/DL — SIGNIFICANT CHANGE UP (ref 8.4–10.5)
CHLORIDE SERPL-SCNC: 100 MMOL/L — SIGNIFICANT CHANGE UP (ref 98–110)
CO2 SERPL-SCNC: 29 MMOL/L — SIGNIFICANT CHANGE UP (ref 17–32)
CREAT SERPL-MCNC: 1.4 MG/DL — SIGNIFICANT CHANGE UP (ref 0.7–1.5)
EGFR: 50 ML/MIN/1.73M2 — LOW
GLUCOSE BLDC GLUCOMTR-MCNC: 118 MG/DL — HIGH (ref 70–99)
GLUCOSE SERPL-MCNC: 120 MG/DL — HIGH (ref 70–99)
HCT VFR BLD CALC: 48.2 % — SIGNIFICANT CHANGE UP (ref 42–52)
HGB BLD-MCNC: 15.1 G/DL — SIGNIFICANT CHANGE UP (ref 14–18)
MAGNESIUM SERPL-MCNC: 2.3 MG/DL — SIGNIFICANT CHANGE UP (ref 1.8–2.4)
MCHC RBC-ENTMCNC: 28.7 PG — SIGNIFICANT CHANGE UP (ref 27–31)
MCHC RBC-ENTMCNC: 31.3 G/DL — LOW (ref 32–37)
MCV RBC AUTO: 91.5 FL — SIGNIFICANT CHANGE UP (ref 80–94)
NRBC # BLD: 0 /100 WBCS — SIGNIFICANT CHANGE UP (ref 0–0)
PLATELET # BLD AUTO: 252 K/UL — SIGNIFICANT CHANGE UP (ref 130–400)
PMV BLD: 12 FL — HIGH (ref 7.4–10.4)
POTASSIUM SERPL-MCNC: 4.8 MMOL/L — SIGNIFICANT CHANGE UP (ref 3.5–5)
POTASSIUM SERPL-SCNC: 4.8 MMOL/L — SIGNIFICANT CHANGE UP (ref 3.5–5)
RBC # BLD: 5.27 M/UL — SIGNIFICANT CHANGE UP (ref 4.7–6.1)
RBC # FLD: 14 % — SIGNIFICANT CHANGE UP (ref 11.5–14.5)
SODIUM SERPL-SCNC: 143 MMOL/L — SIGNIFICANT CHANGE UP (ref 135–146)
TROPONIN T, HIGH SENSITIVITY RESULT: 273 NG/L — CRITICAL HIGH (ref 6–21)
WBC # BLD: 7.7 K/UL — SIGNIFICANT CHANGE UP (ref 4.8–10.8)
WBC # FLD AUTO: 7.7 K/UL — SIGNIFICANT CHANGE UP (ref 4.8–10.8)

## 2024-12-29 PROCEDURE — 93010 ELECTROCARDIOGRAM REPORT: CPT

## 2024-12-29 PROCEDURE — 99233 SBSQ HOSP IP/OBS HIGH 50: CPT

## 2024-12-29 PROCEDURE — 99232 SBSQ HOSP IP/OBS MODERATE 35: CPT

## 2024-12-29 PROCEDURE — 70450 CT HEAD/BRAIN W/O DYE: CPT | Mod: 26

## 2024-12-29 RX ORDER — FUROSEMIDE 20 MG
40 TABLET ORAL DAILY
Refills: 0 | Status: DISCONTINUED | OUTPATIENT
Start: 2024-12-30 | End: 2024-12-30

## 2024-12-29 RX ORDER — LISINOPRIL 30 MG/1
10 TABLET ORAL DAILY
Refills: 0 | Status: DISCONTINUED | OUTPATIENT
Start: 2024-12-29 | End: 2024-12-29

## 2024-12-29 RX ADMIN — BUPROPION HYDROCHLORIDE 150 MILLIGRAM(S): 150 TABLET, EXTENDED RELEASE ORAL at 13:32

## 2024-12-29 RX ADMIN — CHLORHEXIDINE GLUCONATE 1 APPLICATION(S): 1.2 RINSE ORAL at 05:33

## 2024-12-29 RX ADMIN — Medication 80 MILLIGRAM(S): at 13:32

## 2024-12-29 RX ADMIN — CEFTRIAXONE SODIUM 100 MILLIGRAM(S): 1 INJECTION, POWDER, FOR SOLUTION INTRAMUSCULAR; INTRAVENOUS at 13:32

## 2024-12-29 RX ADMIN — PANTOPRAZOLE 40 MILLIGRAM(S): 40 TABLET, DELAYED RELEASE ORAL at 05:27

## 2024-12-29 RX ADMIN — Medication 80 MILLIGRAM(S): at 05:28

## 2024-12-29 RX ADMIN — HEPARIN SODIUM 5000 UNIT(S): 1000 INJECTION, SOLUTION INTRAVENOUS; SUBCUTANEOUS at 17:09

## 2024-12-29 RX ADMIN — Medication 81 MILLIGRAM(S): at 13:32

## 2024-12-29 RX ADMIN — ATORVASTATIN CALCIUM 10 MILLIGRAM(S): 40 TABLET, FILM COATED ORAL at 21:12

## 2024-12-29 RX ADMIN — HEPARIN SODIUM 5000 UNIT(S): 1000 INJECTION, SOLUTION INTRAVENOUS; SUBCUTANEOUS at 05:27

## 2024-12-29 NOTE — PROGRESS NOTE ADULT - SUBJECTIVE AND OBJECTIVE BOX
CHIEF COMPLAINT:    Patient is a 79y old  Male who presents with a chief complaint of Acute renal failure    INTERVAL HPI/OVERNIGHT EVENTS:    Patient seen and examined at bedside. No acute overnight events occurred.    ROS: Denies SOB, chest pain. All other systems are negative.    Medications:  Standing  aspirin enteric coated 81 milliGRAM(s) Oral daily  atorvastatin 10 milliGRAM(s) Oral at bedtime  buPROPion XL (24-Hour) . 150 milliGRAM(s) Oral daily  cefTRIAXone   IVPB 1000 milliGRAM(s) IV Intermittent every 24 hours  chlorhexidine 2% Cloths 1 Application(s) Topical <User Schedule>  heparin   Injectable 5000 Unit(s) SubCutaneous every 12 hours  lisinopril 10 milliGRAM(s) Oral daily  pantoprazole    Tablet 40 milliGRAM(s) Oral before breakfast    PRN Meds        Vital Signs:    T(F): 98.4 (12-29-24 @ 12:35), Max: 98.4 (12-29-24 @ 12:35)  HR: 61 (12-29-24 @ 12:35) (60 - 66)  BP: 107/65 (12-29-24 @ 12:35) (107/65 - 146/72)  RR: 18 (12-29-24 @ 12:35) (18 - 18)  SpO2: 94% (12-29-24 @ 05:03) (94% - 94%)  I&O's Summary    28 Dec 2024 07:01  -  29 Dec 2024 07:00  --------------------------------------------------------  IN: 1316 mL / OUT: 650 mL / NET: 666 mL    29 Dec 2024 07:01  -  29 Dec 2024 16:04  --------------------------------------------------------  IN: 0 mL / OUT: 200 mL / NET: -200 mL      Daily     Daily   CAPILLARY BLOOD GLUCOSE      POCT Blood Glucose.: 118 mg/dL (29 Dec 2024 07:45)      PHYSICAL EXAM:  GENERAL:  NAD  SKIN: No rashes or lesions  HEENT: Atraumatic. Normocephalic. Anicteric  NECK:  No JVD.   PULMONARY: Clear to ausculation bilaterally. No wheezing. No rales  CVS: Normal S1, S2. Regular rate and rhythm. No murmurs.  ABDOMEN/GI: Soft, Nontender, Nondistended; Bowel sounds are present  EXTREMITIES:  chronic stasis  NEUROLOGIC:  No motor deficit.  PSYCH: Alert & oriented x 3, normal affect      LABS:                        15.1   7.70  )-----------( 252      ( 29 Dec 2024 09:14 )             48.2     12-29    143  |  100  |  39[H]  ----------------------------<  120[H]  4.8   |  29  |  1.4    Ca    9.7      29 Dec 2024 09:14  Mg     2.3     12-29                RADIOLOGY & ADDITIONAL TESTS:  Imaging or report Personally Reviewed:  [ ] YES  [ ] NO -->no new images    Telemetry reviewed independently - NSR, no acute events  EKG reviewed independently -->no new EKGs    Consultant(s) Notes Reviewed:  [ ] YES  [ ] NO  Care Discussed with Consultants/Other Providers [ ] YES  [ ] NO    Case discussed with resident  Care discussed with pt

## 2024-12-29 NOTE — PROGRESS NOTE ADULT - ATTENDING COMMENTS
Agree with plan above
Edited note above    Actively diuresing for HF exacerbation. patient is doing well. Will monitor BOBBY as suspect cardio renal.   treating for UTI as well  As for the TIA - will follow up with neuro for any further management as MRI not compatible.    Dispo pending euvolemia, echo findings, and neuro recs.

## 2024-12-29 NOTE — PROGRESS NOTE ADULT - ASSESSMENT
#Acute Decompensated HF  #Elevated troponin likely secondary to CHF, BOBBY  #Hx of HFpEF, severe AS  #CAD s/p CABG (LIMA-LAD, occluded SVG - OM1, PDA), PCI to RCA 2011  #HTN, DM  #Complete Heart Block s/p PPM    Recommendations:  - No clinical signs of ACS, no further clinical benefit in trending troponin at this time  - Pt appears euvolemic, can transition to oral Lasix 40 mg once daily   - Emphasized importance of medication adherence to patient  - C/w ASA, statin  - Cr appears to be at baseline, can resume home Lisinopril  - Outpatient follow up with Dr. Price   #Acute Decompensated HF  #Elevated troponin likely secondary to CHF and BOBBY  #Hx of HFpEF  #Severe AS  #CAD s/p CABG (LIMA-LAD, occluded SVG - OM1, PDA), PCI to RCA 2011  #HTN, DM  #Complete Heart Block s/p PPM      Recommendations:  - No clinical signs of ACS, no further clinical benefit in trending troponin at this time  - Pt appears euvolemic, can transition to oral Lasix 40 mg once daily   - Emphasized importance of medication adherence to patient  - C/w ASA, statin  - Cr appears to be at baseline, can resume home Lisinopril  - Outpatient follow up with Dr. Price

## 2024-12-29 NOTE — PROGRESS NOTE ADULT - SUBJECTIVE AND OBJECTIVE BOX
CHIEF COMPLAINT:  Patient is a 79y old  Male who presents with a chief complaint of Acute renal failure     (28 Dec 2024 10:52)    INTERVAL HISTORY/OVERNIGHT EVENTS:  Pt appears well, participated in PT today, denies any dyspnea or chest pain. States that his LE edema is at baseline. Appears comfortable on RA.    ======================  MEDICATIONS:  aspirin enteric coated 81 milliGRAM(s) Oral daily  atorvastatin 10 milliGRAM(s) Oral at bedtime  buPROPion XL (24-Hour) . 150 milliGRAM(s) Oral daily  cefTRIAXone   IVPB 1000 milliGRAM(s) IV Intermittent every 24 hours  chlorhexidine 2% Cloths 1 Application(s) Topical <User Schedule>  furosemide   Injectable 80 milliGRAM(s) IV Push two times a day  heparin   Injectable 5000 Unit(s) SubCutaneous every 12 hours  pantoprazole    Tablet 40 milliGRAM(s) Oral before breakfast    DRIPS:    PRN:       ======================  PHYSICAL EXAMINATION:  GEN:  nad.   HEENT:  eomi. ncat  PULM:  b/l lung sounds, no crackles  CARD: s1, s2; systolic murmur  ABD: ntnd  EXT:  chronic pedal edema  ======================  OBJECTIVE:        VS:  T(F): 98.3 (12-29 @ 05:03), Max: 98.3 (12-29 @ 05:03)  HR: 60 (12-29 @ 05:03) (60 - 66)  BP: 119/74 (12-29 @ 05:03) (119/74 - 146/72)  RR: 18 (12-29 @ 05:03) (18 - 18)  SpO2: 94% (12-29 @ 05:03) (94% - 94%)  CVP(mm Hg): --  CO: --  CI: --  PA: --  PCWP: --    I/O:      12-26 @ 07:01  -  12-27 @ 07:00  --------------------------------------------------------  IN: 100 mL / OUT: 525 mL / NET: -425 mL    12-27 @ 07:01  - 12-28 @ 07:00  --------------------------------------------------------  IN: 120 mL / OUT: 1104 mL / NET: -984 mL    12-28 @ 07:01  -  12-29 @ 07:00  --------------------------------------------------------  IN: 1316 mL / OUT: 650 mL / NET: 666 mL        Weight trend:  Weight (kg): 122.8 (12-26)    ======================    LABS:                          15.1   7.70  )-----------( 252      ( 29 Dec 2024 09:14 )             48.2     12-29    143  |  100  |  39[H]  ----------------------------<  120[H]  4.8   |  29  |  1.4    Ca    9.7      29 Dec 2024 09:14  Mg     2.3     12-29                  Urinalysis Basic - ( 29 Dec 2024 09:14 )    Color: x / Appearance: x / SG: x / pH: x  Gluc: 120 mg/dL / Ketone: x  / Bili: x / Urobili: x   Blood: x / Protein: x / Nitrite: x   Leuk Esterase: x / RBC: x / WBC x   Sq Epi: x / Non Sq Epi: x / Bacteria: x        Cultures:    Culture - Urine (collected 12-26)  Source: Clean Catch None  Final Report:    <10,000 CFU/mL Normal Urogenital Faye    Culture - Blood (collected 12-26)  Source: .Blood BLOOD  Preliminary Report:    No growth at 48 Hours    Culture - Blood (collected 12-26)  Source: .Blood BLOOD  Preliminary Report:    No growth at 48 Hours           CHIEF COMPLAINT:  Patient is a 79y old  Male who presents with a chief complaint of Acute renal failure     (28 Dec 2024 10:52)      INTERVAL HISTORY/OVERNIGHT EVENTS:  Pt appears well, participated in PT today, denies any dyspnea or chest pain. States that his LE edema is at baseline. Appears comfortable on RA.    ======================  MEDICATIONS:  aspirin enteric coated 81 milliGRAM(s) Oral daily  atorvastatin 10 milliGRAM(s) Oral at bedtime  buPROPion XL (24-Hour) . 150 milliGRAM(s) Oral daily  cefTRIAXone   IVPB 1000 milliGRAM(s) IV Intermittent every 24 hours  chlorhexidine 2% Cloths 1 Application(s) Topical <User Schedule>  furosemide   Injectable 80 milliGRAM(s) IV Push two times a day  heparin   Injectable 5000 Unit(s) SubCutaneous every 12 hours  pantoprazole    Tablet 40 milliGRAM(s) Oral before breakfast    DRIPS:    PRN:       ======================  PHYSICAL EXAMINATION:  GEN:  nad.   HEENT:  eomi. ncat  PULM:  b/l lung sounds, no crackles  CARD: s1, s2; systolic murmur  ABD: ntnd  EXT:  chronic pedal edema  ======================  OBJECTIVE:    VS:  T(F): 98.3 (12-29 @ 05:03), Max: 98.3 (12-29 @ 05:03)  HR: 60 (12-29 @ 05:03) (60 - 66)  BP: 119/74 (12-29 @ 05:03) (119/74 - 146/72)  RR: 18 (12-29 @ 05:03) (18 - 18)  SpO2: 94% (12-29 @ 05:03) (94% - 94%)  CVP(mm Hg): --  CO: --  CI: --  PA: --  PCWP: --    I/O:      12-26 @ 07:01  -  12-27 @ 07:00  --------------------------------------------------------  IN: 100 mL / OUT: 525 mL / NET: -425 mL    12-27 @ 07:01 - 12-28 @ 07:00  --------------------------------------------------------  IN: 120 mL / OUT: 1104 mL / NET: -984 mL    12-28 @ 07:01  -  12-29 @ 07:00  --------------------------------------------------------  IN: 1316 mL / OUT: 650 mL / NET: 666 mL        Weight trend:  Weight (kg): 122.8 (12-26)    ======================    LABS:                          15.1   7.70  )-----------( 252      ( 29 Dec 2024 09:14 )             48.2     12-29    143  |  100  |  39[H]  ----------------------------<  120[H]  4.8   |  29  |  1.4    Ca    9.7      29 Dec 2024 09:14  Mg     2.3     12-29

## 2024-12-29 NOTE — PROGRESS NOTE ADULT - ASSESSMENT
78 yo M PMHx HTN, DLD, CAD prsesented for evaluation of for slurred speech. Patient also reported sob and leg swelling for the past months. Found to be hypoxic and have BOBBY. Admitted for further management.    Acute hypoxic respiratory failure  likely 2/2 Decompensated heart failure  CAD s/p cabg and RCA  stent  Complete Heart Block s/p PPM  - echo showed grade 1 diastolic dysfunction, severe aortic stenosis   - troponin continue uptrending--as per cardio likely residula uptrend from BOBBY  - pt appears euvolemic, d/c IV lasix start oral  - resume lisinopril  - ecg showed AV pacing      Sepsis criteria met on admission due to elevated WBC and   - no obvious source of infection  - UA positive, wbc 270, moderate LE  - Xray chest: Mild pulmonary vascular congestion. No opacities  - Send rvp  - was treated for sepsis from UTI  - completed rocephin  - no other obvious source of infection    BOBBY - likely cardio-renal  - SCr downtrended to 1.7 today  - c/w lasix  - pt remains volume overloaded      Slurred speech and facial droop  - possibly from TIA? - back to baseline  - Per ED, patient reporting slurred speech and facial droop  - CT PERFUSION: No perfusion deficits to suggest areas of completed infarction or at risk territory.  - CTA HEAD/NECK: No large vessel occlusion, aneurysm, or vascular malformation. Moderate-severe atherosclerotic stenosis and bilateral proximal ICAs (approximately 70%).Focal severe stenosis in the proximalV1 segment of the left vertebral artery.  - CTA PE: No CT evidence of acute pulmonary embolus. Bilateral areas of faint ground glass opacities, are nonspecific. Dilated ascending thoracic aorta measuring 4 cm. Additional findings are detailed inthe body of the report.  - Stoke code called, patient is outside the appropriate time window for thrombolytic therapy and back to baseline not a candidate for IV or IA acute stroke intervention.  - On my encounter, patient at baseline, denies any neurologic symptoms  - A&Ox3 today, no neurologic deficits  - Unable to have MRI head due to incompatible AICD leads  - as per neuro repeat CTH--ordered--pt areeable    Severe AS  - seen on ECHO  - outpt structural cards eval    BPH  - not on meds  - outpt eurology    HTN  - BP borderline  - holding 10mg lisinopril qD in the setting of BOBBY, consider resuming once BOBBY resolving. BP currently on softer side.     HLD  PAD  - continue aspirin & statin    DVT: Heparin subQ  Diet: DASH/TLC  Activity: AAT  GI: none  Code: full    #Progress Note Handoff:  Pending (specify): CTH, monitor on oral lasix  Family discussion: As above with pt  Disposition: Home_x__/SNF___/Other________/Unknown at this time________

## 2024-12-30 ENCOUNTER — TRANSCRIPTION ENCOUNTER (OUTPATIENT)
Age: 79
End: 2024-12-30

## 2024-12-30 VITALS
HEART RATE: 72 BPM | TEMPERATURE: 98 F | SYSTOLIC BLOOD PRESSURE: 134 MMHG | RESPIRATION RATE: 18 BRPM | DIASTOLIC BLOOD PRESSURE: 76 MMHG

## 2024-12-30 LAB
ANION GAP SERPL CALC-SCNC: 12 MMOL/L — SIGNIFICANT CHANGE UP (ref 7–14)
BUN SERPL-MCNC: 37 MG/DL — HIGH (ref 10–20)
CALCIUM SERPL-MCNC: 8.7 MG/DL — SIGNIFICANT CHANGE UP (ref 8.4–10.5)
CHLORIDE SERPL-SCNC: 97 MMOL/L — LOW (ref 98–110)
CO2 SERPL-SCNC: 29 MMOL/L — SIGNIFICANT CHANGE UP (ref 17–32)
CREAT SERPL-MCNC: 1.4 MG/DL — SIGNIFICANT CHANGE UP (ref 0.7–1.5)
EGFR: 50 ML/MIN/1.73M2 — LOW
GLUCOSE SERPL-MCNC: 166 MG/DL — HIGH (ref 70–99)
POTASSIUM SERPL-MCNC: 4.2 MMOL/L — SIGNIFICANT CHANGE UP (ref 3.5–5)
POTASSIUM SERPL-SCNC: 4.2 MMOL/L — SIGNIFICANT CHANGE UP (ref 3.5–5)
SODIUM SERPL-SCNC: 138 MMOL/L — SIGNIFICANT CHANGE UP (ref 135–146)

## 2024-12-30 PROCEDURE — 99239 HOSP IP/OBS DSCHRG MGMT >30: CPT

## 2024-12-30 RX ADMIN — BUPROPION HYDROCHLORIDE 150 MILLIGRAM(S): 150 TABLET, EXTENDED RELEASE ORAL at 11:08

## 2024-12-30 RX ADMIN — HEPARIN SODIUM 5000 UNIT(S): 1000 INJECTION, SOLUTION INTRAVENOUS; SUBCUTANEOUS at 05:37

## 2024-12-30 RX ADMIN — Medication 40 MILLIGRAM(S): at 05:37

## 2024-12-30 RX ADMIN — CEFTRIAXONE SODIUM 100 MILLIGRAM(S): 1 INJECTION, POWDER, FOR SOLUTION INTRAMUSCULAR; INTRAVENOUS at 11:08

## 2024-12-30 RX ADMIN — CHLORHEXIDINE GLUCONATE 1 APPLICATION(S): 1.2 RINSE ORAL at 05:41

## 2024-12-30 RX ADMIN — Medication 81 MILLIGRAM(S): at 11:08

## 2024-12-30 RX ADMIN — PANTOPRAZOLE 40 MILLIGRAM(S): 40 TABLET, DELAYED RELEASE ORAL at 05:37

## 2024-12-30 NOTE — DISCHARGE NOTE PROVIDER - PROVIDER TOKENS
PROVIDER:[TOKEN:[[id number]],FOLLOWUP:[2 weeks]] PROVIDER:[TOKEN:[[id number]],FOLLOWUP:[2 weeks]],PROVIDER:[TOKEN:[[id number]],FOLLOWUP:[2 weeks]]

## 2024-12-30 NOTE — DISCHARGE NOTE PROVIDER - NSDCPNSUBOBJ_GEN_ALL_CORE
Pt seen and examined at bedside. Pt feels well. Has chronic venous stasis, reports swelling is at baseline. Not requiring o2. repeat CTH unchanged. Pt aware of severe AS. BP has been controlled off lisinopril therefore will continue to hold in setting of severe AS. Stable for dc home.

## 2024-12-30 NOTE — DISCHARGE NOTE PROVIDER - NSDCFUSCHEDAPPT_GEN_ALL_CORE_FT
[facility] Physician [facility]  CARDIOLOGY [address] [facility]  Scheduled Appointment: 02/07/2025

## 2024-12-30 NOTE — DISCHARGE NOTE PROVIDER - NSDCMRMEDTOKEN_GEN_ALL_CORE_FT
Aspir 81 oral delayed release tablet: 1 tab(s) orally once a day  buPROPion 150 mg/24 hours (XL) oral tablet, extended release: 1 tab(s) orally once a day  furosemide 40 mg oral tablet: 1 tab(s) orally once a day  gabapentin 300 mg oral capsule: 1 cap(s) orally 3 times a day  lisinopril 10 mg oral tablet: 1 tab(s) orally once a day  pantoprazole 40 mg oral delayed release tablet: 1 tab(s) orally once a day  simvastatin 20 mg oral tablet: 1 tab(s) orally once a day (at bedtime)   Aspir 81 oral delayed release tablet: 1 tab(s) orally once a day  buPROPion 150 mg/24 hours (XL) oral tablet, extended release: 1 tab(s) orally once a day  furosemide 40 mg oral tablet: 1 tab(s) orally once a day  gabapentin 300 mg oral capsule: 1 cap(s) orally 3 times a day  pantoprazole 40 mg oral delayed release tablet: 1 tab(s) orally once a day  simvastatin 20 mg oral tablet: 1 tab(s) orally once a day (at bedtime)

## 2024-12-30 NOTE — DISCHARGE NOTE PROVIDER - NSDCCPCAREPLAN_GEN_ALL_CORE_FT
PRINCIPAL DISCHARGE DIAGNOSIS  Diagnosis: BOBBY (acute kidney injury)  Assessment and Plan of Treatment: You were noted to have a temporary insult to your kidney function either at the time that you arrived at the hospital or during your stay here. We have monitored your kidney function with blood work during your time here and you are at a level that no longer requires continued hospital level care. It was for this reason that we were holding your blood pressure medication - lisinopril - as this can sometimes contribute to kidney injury.   We do recommend that you follow up to continually have your kidney function checked. You can follow up with your primary care doctor, or, if recommended in the discharge paperwork, you should follow up with a kidney specialist called a nephrologist.      SECONDARY DISCHARGE DIAGNOSES  Diagnosis: Acute UTI  Assessment and Plan of Treatment:     Diagnosis: Elevated troponin  Assessment and Plan of Treatment:      PRINCIPAL DISCHARGE DIAGNOSIS  Diagnosis: BOBBY (acute kidney injury)  Assessment and Plan of Treatment: You were noted to have a temporary insult to your kidney function either at the time that you arrived at the hospital or during your stay here. We have monitored your kidney function with blood work during your time here and you are at a level that no longer requires continued hospital level care. It was for this reason that we were holding your blood pressure medication - lisinopril - as this can sometimes contribute to kidney injury.   We do recommend that you follow up to continually have your kidney function checked. You can follow up with your primary care doctor, or, if recommended in the discharge paperwork, you should follow up with a kidney specialist called a nephrologist.      SECONDARY DISCHARGE DIAGNOSES  Diagnosis: Acute UTI  Assessment and Plan of Treatment:     Diagnosis: Elevated troponin  Assessment and Plan of Treatment:     Diagnosis: CHF exacerbation  Assessment and Plan of Treatment: You had diastolic heart failure which is impaired relaxation of the heart. This caused you to "fill up" with fluid. Please take your lasix as prescribed.   Heart failure (HF) is a condition that does not allow your heart to fill or pump properly. Not enough oxygen in your blood gets to your organs and tissues. HF can occur in the right side, the left side, or both lower chambers of your heart. HF is often caused by damage or injury to your heart. The damage may be caused by heart attack, other heart conditions, or high blood pressure. HF is a long-term condition that tends to get worse over time. It is important to manage your health to improve your quality of life. HF can be worsened by heavy alcohol use, smoking, diabetes that is not controlled, or obesity.  Call 911 for:  Squeezing, pressure, or pain in your chest that lasts longer than 5 minutes or returns  Discomfort or pain in your back, neck, jaw, stomach, or arm  Trouble breathing  Nausea or vomiting  Lightheadedness or a sudden cold sweat, especially with chest pain or trouble breathing.  Seek care immediately if:  You gain 3 or more pounds (1.4 kg) in a day  Your heartbeat is fast, slow, or uneven all the time.  Do not smoke. Nicotine and other chemicals in cigarettes and cigars can cause lung damage and make HF difficult to manage.   Do not drink alcohol or take illegal drugs. . Weigh yourself every morning. Use the same scale, in the same spot. Do this after you use the bathroom, but before you eat or drink anything. Record your weight each day so you will notice any sudden weight gain. Swelling and weight gain are signs of fluid retention.Manage any chronic health conditions you have. These include high blood pressure, diabetes, obesity, high cholesterol, metabolic syndrome, and COPD. Stay active. If you are not active, your symptoms are likely to worsen quickly. Get a flu shot every year. You may also need the pneumonia vaccine. The flu and pneumonia can be severe for a person who has HF.    Diagnosis: Severe aortic stenosis  Assessment and Plan of Treatment: You were found to have severe aortic stenosis which is when your heart valve is very "stiff" and cannot open appropriately. This cause cause you to have low blood pressure, fill up with fluid, pass out, and become very sick. You may need a valve replacement. Please see a special cardiologist called a structural cardiologist to further evaluation. Please hold your lisinopril as this can cause dangerously low blood pressure in this condition. If your blood pressure becomes high you may need a new medication called losartan.    Diagnosis: Slurred speech  Assessment and Plan of Treatment: It is unclear what caused your slurred speech. It does not appear to be from stroke. Please continue taking aspirin and Lipitor as a precaution.

## 2024-12-30 NOTE — CHART NOTE - NSCHARTNOTEFT_GEN_A_CORE
Unable to obtain MRI brain. Repeat CT head without interval acute process as per my read, official read pending. Low suspicion for TIA/small stroke. Continue home ASA/statin. Please call with any further questions/concerns.

## 2024-12-30 NOTE — DISCHARGE NOTE PROVIDER - CARE PROVIDER_API CALL
Trey Perdomo  Internal Medicine  [address] [place] [address], [address]  [place] [address]  Phone: [phone]  Fax: [phone]  Follow Up Time: 2 weeks   Trey Perdomo  Internal Medicine  [address] [facility], [address]  [place] [address]  Phone: [phone]  Fax: [phone]  Follow Up Time: 2 weeks    Donny Schumacher  Interventional Cardiology  [address] [place] [address], [address]  [place] [address]  Phone: [phone]  Fax: [phone]  Follow Up Time: 2 weeks

## 2024-12-30 NOTE — DISCHARGE NOTE PROVIDER - CARE PROVIDERS DIRECT ADDRESSES
,DirectAddress_Unknown ,DirectAddress_Unknown,[email or web address]

## 2024-12-30 NOTE — DISCHARGE NOTE PROVIDER - HOSPITAL COURSE
80 yo m with pmh of htn, hld, bph, cad/pad on ASA brought in by EMS for slurred speech. Patient also reported sob and leg swelling for the past months. Per EMS patient was found to be hypoxic to 88 improved with 2 L nasal cannula.  Patient denies any recent fevers cough chest pain nausea vomiting or abdominal pain. Stroke code called, imaging negative for acute stroke. On my encounter, patient denies any neurologic symptoms, back at baseline.     ED Vitals:  · BP Systolic  93 mm Hg  · BP Diastolic  45 mm Hg  · Heart Rate 100 /min  · Respiration Rate (breaths/min) 19 /min  · Temp (F) 98.2 Degrees F  · Temp (C) 36.8 Degrees C  · SpO2 (%)  91 % on 2L nc    Labs: WBC 19K, hGB 12.2, nA 133, k 5.8, bun 39, creat 2.3, gfr 28, trop 83, proBNP 3344  VBG: ph 7.29, lactate 1, c02 55, bicarb 26  UA positive, wbc 270, moderate LE    Xray chest: Mild pulmonary vascular congestion    Patient admitted for further management.    Acute hypoxic respiratory failure  likely 2/2 Decompensated heart failure  CAD s/p cabg and RCA  stent  Complete Heart Block s/p PPM  - echo showed grade 1 diastolic dysfunction, severe aortic stenosis   - troponin continued uptrending--as per cardio likely residula uptrend from BOBBY  - pt appears euvolemic, was started on IV lasix now on po   - ecg showed AV pacing      Sepsis criteria met on admission due to elevated WBC and   - no obvious source of infection  - UA positive, wbc 270, moderate LE  - Xray chest: Mild pulmonary vascular congestion. No opacities  - Send rvp  - was treated for sepsis from UTI  - completed rocephin  - no other obvious source of infection    BOBBY - likely cardio-renal  - SCr downtrended  - c/w lasix    Slurred speech and facial droop - back to baseline   - possibly TIA?   - Per ED, patient reporting slurred speech and facial droop  - CT PERFUSION: No perfusion deficits to suggest areas of completed infarction or at risk territory.  - CTA HEAD/NECK: No large vessel occlusion, aneurysm, or vascular malformation. Moderate-severe atherosclerotic stenosis and bilateral proximal ICAs (approximately 70%).Focal severe stenosis in the proximalV1 segment of the left vertebral artery.  - CTA PE: No CT evidence of acute pulmonary embolus. Bilateral areas of faint ground glass opacities, are nonspecific. Dilated ascending thoracic aorta measuring 4 cm. Additional findings are detailed inthe body of the report.  - Stoke code called, patient is outside the appropriate time window for thrombolytic therapy and back to baseline not a candidate for IV or IA acute stroke intervention.  - On my encounter, patient at baseline, denies any neurologic symptoms  - A&Ox3 today, no neurologic deficits  - Unable to have MRI head due to incompatible AICD leads  - repeat CTH completed  - cleared by neuro to resume ASA/statin - low suspecion to TIA/small stroke     Severe AS  - seen on ECHO  - outpt structural cards eval    BPH  - not on meds  - outpt urology    HTN  - BP borderline  - holding 10mg lisinopril qD in the setting of BOBBY, consider resuming once BOBBY resolving. BP currently on softer side.     HLD  PAD  - continue aspirin & statin    Discussion of discharge plan of care, including discharge diagnoses, medication reconciliation, and follow-ups was conducted with Dr. Peralta on12/30/24, and discharge was approved.

## 2024-12-31 ENCOUNTER — APPOINTMENT (OUTPATIENT)
Dept: ORTHOPEDIC SURGERY | Facility: CLINIC | Age: 79
End: 2024-12-31

## 2025-01-06 DIAGNOSIS — I44.2 ATRIOVENTRICULAR BLOCK, COMPLETE: ICD-10-CM

## 2025-01-06 DIAGNOSIS — J96.01 ACUTE RESPIRATORY FAILURE WITH HYPOXIA: ICD-10-CM

## 2025-01-06 DIAGNOSIS — N17.8 OTHER ACUTE KIDNEY FAILURE: ICD-10-CM

## 2025-01-06 DIAGNOSIS — Z95.0 PRESENCE OF CARDIAC PACEMAKER: ICD-10-CM

## 2025-01-06 DIAGNOSIS — I35.0 NONRHEUMATIC AORTIC (VALVE) STENOSIS: ICD-10-CM

## 2025-01-06 DIAGNOSIS — A41.9 SEPSIS, UNSPECIFIED ORGANISM: ICD-10-CM

## 2025-01-06 DIAGNOSIS — Z91.118 PATIENT'S NONCOMPLIANCE WITH DIETARY REGIMEN FOR OTHER REASON: ICD-10-CM

## 2025-01-06 DIAGNOSIS — I65.02 OCCLUSION AND STENOSIS OF LEFT VERTEBRAL ARTERY: ICD-10-CM

## 2025-01-06 DIAGNOSIS — Z95.1 PRESENCE OF AORTOCORONARY BYPASS GRAFT: ICD-10-CM

## 2025-01-06 DIAGNOSIS — E11.51 TYPE 2 DIABETES MELLITUS WITH DIABETIC PERIPHERAL ANGIOPATHY WITHOUT GANGRENE: ICD-10-CM

## 2025-01-06 DIAGNOSIS — I77.810 THORACIC AORTIC ECTASIA: ICD-10-CM

## 2025-01-06 DIAGNOSIS — Z86.718 PERSONAL HISTORY OF OTHER VENOUS THROMBOSIS AND EMBOLISM: ICD-10-CM

## 2025-01-06 DIAGNOSIS — T50.1X6A UNDERDOSING OF LOOP [HIGH-CEILING] DIURETICS, INITIAL ENCOUNTER: ICD-10-CM

## 2025-01-06 DIAGNOSIS — R33.9 RETENTION OF URINE, UNSPECIFIED: ICD-10-CM

## 2025-01-06 DIAGNOSIS — E66.9 OBESITY, UNSPECIFIED: ICD-10-CM

## 2025-01-06 DIAGNOSIS — N40.0 BENIGN PROSTATIC HYPERPLASIA WITHOUT LOWER URINARY TRACT SYMPTOMS: ICD-10-CM

## 2025-01-06 DIAGNOSIS — N39.0 URINARY TRACT INFECTION, SITE NOT SPECIFIED: ICD-10-CM

## 2025-01-06 DIAGNOSIS — Z95.5 PRESENCE OF CORONARY ANGIOPLASTY IMPLANT AND GRAFT: ICD-10-CM

## 2025-01-06 DIAGNOSIS — E11.22 TYPE 2 DIABETES MELLITUS WITH DIABETIC CHRONIC KIDNEY DISEASE: ICD-10-CM

## 2025-01-06 DIAGNOSIS — I65.29 OCCLUSION AND STENOSIS OF UNSPECIFIED CAROTID ARTERY: ICD-10-CM

## 2025-01-06 DIAGNOSIS — E87.5 HYPERKALEMIA: ICD-10-CM

## 2025-01-06 DIAGNOSIS — I50.33 ACUTE ON CHRONIC DIASTOLIC (CONGESTIVE) HEART FAILURE: ICD-10-CM

## 2025-01-06 DIAGNOSIS — I21.A1 MYOCARDIAL INFARCTION TYPE 2: ICD-10-CM

## 2025-01-06 DIAGNOSIS — Z79.82 LONG TERM (CURRENT) USE OF ASPIRIN: ICD-10-CM

## 2025-01-06 DIAGNOSIS — Z86.73 PERSONAL HISTORY OF TRANSIENT ISCHEMIC ATTACK (TIA), AND CEREBRAL INFARCTION WITHOUT RESIDUAL DEFICITS: ICD-10-CM

## 2025-01-06 DIAGNOSIS — G93.41 METABOLIC ENCEPHALOPATHY: ICD-10-CM

## 2025-01-13 ENCOUNTER — APPOINTMENT (OUTPATIENT)
Dept: CARDIOLOGY | Facility: CLINIC | Age: 80
End: 2025-01-13
Payer: MEDICARE

## 2025-01-13 VITALS
BODY MASS INDEX: 40.01 KG/M2 | SYSTOLIC BLOOD PRESSURE: 130 MMHG | HEART RATE: 62 BPM | HEIGHT: 68 IN | DIASTOLIC BLOOD PRESSURE: 64 MMHG | WEIGHT: 264 LBS

## 2025-01-13 DIAGNOSIS — Z95.1 PRESENCE OF AORTOCORONARY BYPASS GRAFT: ICD-10-CM

## 2025-01-13 DIAGNOSIS — I25.10 ATHEROSCLEROTIC HEART DISEASE OF NATIVE CORONARY ARTERY W/OUT ANGINA PECTORIS: ICD-10-CM

## 2025-01-13 DIAGNOSIS — Z86.79 PERSONAL HISTORY OF OTHER DISEASES OF THE CIRCULATORY SYSTEM: ICD-10-CM

## 2025-01-13 DIAGNOSIS — I44.2 ATRIOVENTRICULAR BLOCK, COMPLETE: ICD-10-CM

## 2025-01-13 DIAGNOSIS — I87.8 OTHER SPECIFIED DISORDERS OF VEINS: ICD-10-CM

## 2025-01-13 DIAGNOSIS — E66.01 MORBID (SEVERE) OBESITY DUE TO EXCESS CALORIES: ICD-10-CM

## 2025-01-13 PROCEDURE — 99214 OFFICE O/P EST MOD 30 MIN: CPT | Mod: 25

## 2025-01-13 PROCEDURE — 93000 ELECTROCARDIOGRAM COMPLETE: CPT

## 2025-01-13 RX ORDER — BUPROPION HYDROCHLORIDE 150 MG/1
150 TABLET, FILM COATED, EXTENDED RELEASE ORAL DAILY
Refills: 0 | Status: ACTIVE | COMMUNITY

## 2025-02-07 ENCOUNTER — APPOINTMENT (OUTPATIENT)
Dept: CARDIOLOGY | Facility: CLINIC | Age: 80
End: 2025-02-07

## 2025-02-07 PROCEDURE — 93296 REM INTERROG EVL PM/IDS: CPT

## 2025-02-07 PROCEDURE — 93294 REM INTERROG EVL PM/LDLS PM: CPT

## 2025-05-15 ENCOUNTER — APPOINTMENT (OUTPATIENT)
Dept: CARDIOLOGY | Facility: CLINIC | Age: 80
End: 2025-05-15

## 2025-05-20 ENCOUNTER — APPOINTMENT (OUTPATIENT)
Dept: CARDIOLOGY | Facility: CLINIC | Age: 80
End: 2025-05-20
Payer: MEDICARE

## 2025-05-20 VITALS
SYSTOLIC BLOOD PRESSURE: 110 MMHG | HEIGHT: 68 IN | HEART RATE: 65 BPM | BODY MASS INDEX: 41.37 KG/M2 | DIASTOLIC BLOOD PRESSURE: 80 MMHG | WEIGHT: 273 LBS

## 2025-05-20 DIAGNOSIS — I25.10 ATHEROSCLEROTIC HEART DISEASE OF NATIVE CORONARY ARTERY W/OUT ANGINA PECTORIS: ICD-10-CM

## 2025-05-20 DIAGNOSIS — E66.01 MORBID (SEVERE) OBESITY DUE TO EXCESS CALORIES: ICD-10-CM

## 2025-05-20 DIAGNOSIS — Z95.1 PRESENCE OF AORTOCORONARY BYPASS GRAFT: ICD-10-CM

## 2025-05-20 PROCEDURE — 99214 OFFICE O/P EST MOD 30 MIN: CPT | Mod: 25

## 2025-05-20 PROCEDURE — 93000 ELECTROCARDIOGRAM COMPLETE: CPT

## 2025-05-22 ENCOUNTER — APPOINTMENT (OUTPATIENT)
Dept: ORTHOPEDIC SURGERY | Facility: CLINIC | Age: 80
End: 2025-05-22

## 2025-05-22 DIAGNOSIS — M17.11 UNILATERAL PRIMARY OSTEOARTHRITIS, RIGHT KNEE: ICD-10-CM

## 2025-05-22 DIAGNOSIS — M17.12 UNILATERAL PRIMARY OSTEOARTHRITIS, LEFT KNEE: ICD-10-CM

## 2025-05-22 PROCEDURE — 20610 DRAIN/INJ JOINT/BURSA W/O US: CPT | Mod: 50

## 2025-05-27 ENCOUNTER — NON-APPOINTMENT (OUTPATIENT)
Age: 80
End: 2025-05-27

## 2025-05-27 ENCOUNTER — APPOINTMENT (OUTPATIENT)
Dept: ELECTROPHYSIOLOGY | Facility: CLINIC | Age: 80
End: 2025-05-27
Payer: MEDICARE

## 2025-05-27 VITALS
WEIGHT: 275 LBS | BODY MASS INDEX: 41.68 KG/M2 | DIASTOLIC BLOOD PRESSURE: 70 MMHG | HEIGHT: 68 IN | HEART RATE: 61 BPM | SYSTOLIC BLOOD PRESSURE: 90 MMHG

## 2025-05-27 DIAGNOSIS — I44.2 ATRIOVENTRICULAR BLOCK, COMPLETE: ICD-10-CM

## 2025-05-27 DIAGNOSIS — Z45.018 ENCOUNTER FOR ADJUSTMENT AND MANAGEMENT OF OTHER PART OF CARDIAC PACEMAKER: ICD-10-CM

## 2025-05-27 DIAGNOSIS — Z86.79 PERSONAL HISTORY OF OTHER DISEASES OF THE CIRCULATORY SYSTEM: ICD-10-CM

## 2025-05-27 PROCEDURE — 99214 OFFICE O/P EST MOD 30 MIN: CPT

## 2025-05-27 PROCEDURE — 93280 PM DEVICE PROGR EVAL DUAL: CPT

## 2025-05-27 RX ORDER — LISINOPRIL 10 MG/1
10 TABLET ORAL DAILY
Refills: 0 | Status: ACTIVE | COMMUNITY

## 2025-05-27 RX ORDER — GABAPENTIN 300 MG/1
300 CAPSULE ORAL
Refills: 0 | Status: ACTIVE | COMMUNITY

## 2025-07-02 ENCOUNTER — NON-APPOINTMENT (OUTPATIENT)
Age: 80
End: 2025-07-02

## 2025-07-13 ENCOUNTER — EMERGENCY (EMERGENCY)
Facility: HOSPITAL | Age: 80
LOS: 0 days | Discharge: HOME | End: 2025-07-13
Attending: EMERGENCY MEDICINE
Payer: MEDICARE

## 2025-07-13 VITALS
SYSTOLIC BLOOD PRESSURE: 98 MMHG | DIASTOLIC BLOOD PRESSURE: 65 MMHG | RESPIRATION RATE: 16 BRPM | TEMPERATURE: 98 F | HEART RATE: 91 BPM | OXYGEN SATURATION: 96 %

## 2025-07-13 VITALS — SYSTOLIC BLOOD PRESSURE: 116 MMHG | DIASTOLIC BLOOD PRESSURE: 69 MMHG

## 2025-07-13 DIAGNOSIS — Z95.1 PRESENCE OF AORTOCORONARY BYPASS GRAFT: ICD-10-CM

## 2025-07-13 DIAGNOSIS — E78.5 HYPERLIPIDEMIA, UNSPECIFIED: ICD-10-CM

## 2025-07-13 DIAGNOSIS — Z95.0 PRESENCE OF CARDIAC PACEMAKER: ICD-10-CM

## 2025-07-13 DIAGNOSIS — R53.83 OTHER FATIGUE: ICD-10-CM

## 2025-07-13 DIAGNOSIS — Z95.1 PRESENCE OF AORTOCORONARY BYPASS GRAFT: Chronic | ICD-10-CM

## 2025-07-13 DIAGNOSIS — I25.10 ATHEROSCLEROTIC HEART DISEASE OF NATIVE CORONARY ARTERY WITHOUT ANGINA PECTORIS: ICD-10-CM

## 2025-07-13 DIAGNOSIS — I10 ESSENTIAL (PRIMARY) HYPERTENSION: ICD-10-CM

## 2025-07-13 DIAGNOSIS — Z87.440 PERSONAL HISTORY OF URINARY (TRACT) INFECTIONS: ICD-10-CM

## 2025-07-13 DIAGNOSIS — I95.9 HYPOTENSION, UNSPECIFIED: ICD-10-CM

## 2025-07-13 DIAGNOSIS — Z98.890 OTHER SPECIFIED POSTPROCEDURAL STATES: Chronic | ICD-10-CM

## 2025-07-13 DIAGNOSIS — R63.8 OTHER SYMPTOMS AND SIGNS CONCERNING FOOD AND FLUID INTAKE: ICD-10-CM

## 2025-07-13 DIAGNOSIS — Z95.5 PRESENCE OF CORONARY ANGIOPLASTY IMPLANT AND GRAFT: ICD-10-CM

## 2025-07-13 DIAGNOSIS — Z86.79 PERSONAL HISTORY OF OTHER DISEASES OF THE CIRCULATORY SYSTEM: ICD-10-CM

## 2025-07-13 DIAGNOSIS — Z95.0 PRESENCE OF CARDIAC PACEMAKER: Chronic | ICD-10-CM

## 2025-07-13 DIAGNOSIS — R53.1 WEAKNESS: ICD-10-CM

## 2025-07-13 DIAGNOSIS — N40.0 BENIGN PROSTATIC HYPERPLASIA WITHOUT LOWER URINARY TRACT SYMPTOMS: ICD-10-CM

## 2025-07-13 DIAGNOSIS — Z79.82 LONG TERM (CURRENT) USE OF ASPIRIN: ICD-10-CM

## 2025-07-13 DIAGNOSIS — Z98.84 BARIATRIC SURGERY STATUS: Chronic | ICD-10-CM

## 2025-07-13 LAB
ALBUMIN SERPL ELPH-MCNC: 4 G/DL — SIGNIFICANT CHANGE UP (ref 3.5–5.2)
ALP SERPL-CCNC: 115 U/L — SIGNIFICANT CHANGE UP (ref 30–115)
ALT FLD-CCNC: 6 U/L — SIGNIFICANT CHANGE UP (ref 0–41)
ANION GAP SERPL CALC-SCNC: 10 MMOL/L — SIGNIFICANT CHANGE UP (ref 7–14)
APPEARANCE UR: CLEAR — SIGNIFICANT CHANGE UP
AST SERPL-CCNC: 11 U/L — SIGNIFICANT CHANGE UP (ref 0–41)
BACTERIA # UR AUTO: NEGATIVE /HPF — SIGNIFICANT CHANGE UP
BASOPHILS # BLD AUTO: 0.02 K/UL — SIGNIFICANT CHANGE UP (ref 0–0.2)
BASOPHILS NFR BLD AUTO: 0.3 % — SIGNIFICANT CHANGE UP (ref 0–1)
BILIRUB SERPL-MCNC: 0.6 MG/DL — SIGNIFICANT CHANGE UP (ref 0.2–1.2)
BILIRUB UR-MCNC: NEGATIVE — SIGNIFICANT CHANGE UP
BUN SERPL-MCNC: 48 MG/DL — HIGH (ref 10–20)
CALCIUM SERPL-MCNC: 8.6 MG/DL — SIGNIFICANT CHANGE UP (ref 8.4–10.5)
CAST: 11 /LPF — HIGH (ref 0–4)
CHLORIDE SERPL-SCNC: 102 MMOL/L — SIGNIFICANT CHANGE UP (ref 98–110)
CO2 SERPL-SCNC: 28 MMOL/L — SIGNIFICANT CHANGE UP (ref 17–32)
COLOR SPEC: YELLOW — SIGNIFICANT CHANGE UP
CREAT SERPL-MCNC: 1.5 MG/DL — SIGNIFICANT CHANGE UP (ref 0.7–1.5)
DIFF PNL FLD: NEGATIVE — SIGNIFICANT CHANGE UP
EGFR: 46 ML/MIN/1.73M2 — LOW
EGFR: 46 ML/MIN/1.73M2 — LOW
EOSINOPHIL # BLD AUTO: 0.17 K/UL — SIGNIFICANT CHANGE UP (ref 0–0.7)
EOSINOPHIL NFR BLD AUTO: 2.6 % — SIGNIFICANT CHANGE UP (ref 0–8)
FLUAV AG NPH QL: SIGNIFICANT CHANGE UP
FLUBV AG NPH QL: SIGNIFICANT CHANGE UP
GLUCOSE SERPL-MCNC: 90 MG/DL — SIGNIFICANT CHANGE UP (ref 70–99)
GLUCOSE UR QL: NEGATIVE MG/DL — SIGNIFICANT CHANGE UP
HCT VFR BLD CALC: 42.9 % — SIGNIFICANT CHANGE UP (ref 42–52)
HGB BLD-MCNC: 13.4 G/DL — LOW (ref 14–18)
IMM GRANULOCYTES NFR BLD AUTO: 0.5 % — HIGH (ref 0.1–0.3)
KETONES UR QL: NEGATIVE MG/DL — SIGNIFICANT CHANGE UP
LEUKOCYTE ESTERASE UR-ACNC: ABNORMAL
LYMPHOCYTES # BLD AUTO: 1.82 K/UL — SIGNIFICANT CHANGE UP (ref 1.2–3.4)
LYMPHOCYTES # BLD AUTO: 27.4 % — SIGNIFICANT CHANGE UP (ref 20.5–51.1)
MAGNESIUM SERPL-MCNC: 2.3 MG/DL — SIGNIFICANT CHANGE UP (ref 1.8–2.4)
MCHC RBC-ENTMCNC: 29.8 PG — SIGNIFICANT CHANGE UP (ref 27–31)
MCHC RBC-ENTMCNC: 31.2 G/DL — LOW (ref 32–37)
MCV RBC AUTO: 95.3 FL — HIGH (ref 80–94)
MONOCYTES # BLD AUTO: 0.67 K/UL — HIGH (ref 0.1–0.6)
MONOCYTES NFR BLD AUTO: 10.1 % — HIGH (ref 1.7–9.3)
NEUTROPHILS # BLD AUTO: 3.94 K/UL — SIGNIFICANT CHANGE UP (ref 1.4–6.5)
NEUTROPHILS NFR BLD AUTO: 59.1 % — SIGNIFICANT CHANGE UP (ref 42.2–75.2)
NITRITE UR-MCNC: NEGATIVE — SIGNIFICANT CHANGE UP
NRBC BLD AUTO-RTO: 0 /100 WBCS — SIGNIFICANT CHANGE UP (ref 0–0)
PH UR: 5.5 — SIGNIFICANT CHANGE UP (ref 5–8)
PLATELET # BLD AUTO: 112 K/UL — LOW (ref 130–400)
PMV BLD: 12.4 FL — HIGH (ref 7.4–10.4)
POTASSIUM SERPL-MCNC: 5 MMOL/L — SIGNIFICANT CHANGE UP (ref 3.5–5)
POTASSIUM SERPL-SCNC: 5 MMOL/L — SIGNIFICANT CHANGE UP (ref 3.5–5)
PROT SERPL-MCNC: 6.6 G/DL — SIGNIFICANT CHANGE UP (ref 6–8)
PROT UR-MCNC: NEGATIVE MG/DL — SIGNIFICANT CHANGE UP
RBC # BLD: 4.5 M/UL — LOW (ref 4.7–6.1)
RBC # FLD: 13.3 % — SIGNIFICANT CHANGE UP (ref 11.5–14.5)
RBC CASTS # UR COMP ASSIST: 0 /HPF — SIGNIFICANT CHANGE UP (ref 0–4)
RSV RNA NPH QL NAA+NON-PROBE: SIGNIFICANT CHANGE UP
SARS-COV-2 RNA SPEC QL NAA+PROBE: SIGNIFICANT CHANGE UP
SODIUM SERPL-SCNC: 140 MMOL/L — SIGNIFICANT CHANGE UP (ref 135–146)
SOURCE RESPIRATORY: SIGNIFICANT CHANGE UP
SP GR SPEC: 1.01 — SIGNIFICANT CHANGE UP (ref 1–1.03)
SQUAMOUS # UR AUTO: 1 /HPF — SIGNIFICANT CHANGE UP (ref 0–5)
UROBILINOGEN FLD QL: 0.2 MG/DL — SIGNIFICANT CHANGE UP (ref 0.2–1)
WBC # BLD: 6.65 K/UL — SIGNIFICANT CHANGE UP (ref 4.8–10.8)
WBC # FLD AUTO: 6.65 K/UL — SIGNIFICANT CHANGE UP (ref 4.8–10.8)
WBC UR QL: 6 /HPF — HIGH (ref 0–5)

## 2025-07-13 PROCEDURE — 85025 COMPLETE CBC W/AUTO DIFF WBC: CPT

## 2025-07-13 PROCEDURE — 87086 URINE CULTURE/COLONY COUNT: CPT

## 2025-07-13 PROCEDURE — 99285 EMERGENCY DEPT VISIT HI MDM: CPT

## 2025-07-13 PROCEDURE — 87637 SARSCOV2&INF A&B&RSV AMP PRB: CPT

## 2025-07-13 PROCEDURE — 83735 ASSAY OF MAGNESIUM: CPT

## 2025-07-13 PROCEDURE — 93005 ELECTROCARDIOGRAM TRACING: CPT

## 2025-07-13 PROCEDURE — 71045 X-RAY EXAM CHEST 1 VIEW: CPT

## 2025-07-13 PROCEDURE — 36000 PLACE NEEDLE IN VEIN: CPT

## 2025-07-13 PROCEDURE — 80053 COMPREHEN METABOLIC PANEL: CPT

## 2025-07-13 PROCEDURE — 36415 COLL VENOUS BLD VENIPUNCTURE: CPT

## 2025-07-13 PROCEDURE — 99285 EMERGENCY DEPT VISIT HI MDM: CPT | Mod: 25

## 2025-07-13 PROCEDURE — 71045 X-RAY EXAM CHEST 1 VIEW: CPT | Mod: 26

## 2025-07-13 PROCEDURE — 81001 URINALYSIS AUTO W/SCOPE: CPT

## 2025-07-13 PROCEDURE — 93010 ELECTROCARDIOGRAM REPORT: CPT

## 2025-07-13 RX ADMIN — Medication 1000 MILLILITER(S): at 17:33

## 2025-07-13 NOTE — ED PROVIDER NOTE - EKG/XRAY ADDITIONAL INFORMATION
EKG Interpretation by Dr. Caio Boothe: normal  EKG: NSR, nml axis, normal intervals, no ST Elevations   Independent interpretation of the chest  film performed by: Dr. Caio Boothe: WILMER

## 2025-07-13 NOTE — ED PROVIDER NOTE - PATIENT PORTAL LINK FT
Problem: Adult Inpatient Plan of Care  Goal: Plan of Care Review  Outcome: Ongoing (interventions implemented as appropriate)  POC reviewed with pt. AAO x4.  Pt remained free from falls. IVF infusing NS @ 200ml/hr. C/o pain relieved with PRN pain meds. Questions and concerns addressed. Pt progressing towards goals. Will continue to monitor. See flow sheets for full assessment and VS         You can access the FollowMyHealth Patient Portal offered by [facility] by registering at the following website: [email or web address]. By joining [employer]’s FollowMyHealth portal, you will also be able to view your health information using other applications (apps) compatible with our system.

## 2025-07-13 NOTE — ED PROVIDER NOTE - OBJECTIVE STATEMENT
80-year-old male history of hypertension, hyperlipidemia, BPH, CAD status post CABG/stents on aspirin, complete heart block status post pacemaker presenting to ER for evaluation of weakness and low blood pressure.  Patient states for the last 3 days has had generalized weakness/fatigue and decreased appetite.  Patient noted to have recently finished course of Macrobid for UTI.  He otherwise denies any fever, cough, URI symptoms, nausea, vomiting, diarrhea, chest pain, shortness of breath, abdominal pain, black or bloody stools, dysuria/frequency.

## 2025-07-13 NOTE — ED PROVIDER NOTE - CLINICAL SUMMARY MEDICAL DECISION MAKING FREE TEXT BOX
80-year-old male history of hypertension, hyperlipidemia, BPH, CAD status post CABG/stents on aspirin, complete heart block status post pacemaker presenting to ER for evaluation of weakness and low blood pressure.  Patient states for the last 3 days has had generalized weakness/fatigue and decreased appetite.  Patient noted to have recently finished course of Macrobid for UTI. on exam well appearing male vitals wnl non focal neuro exam abd soft non tender lungs cta. labs imaging ekg reviewed no acute abnormalities. shared decision conversation had with pt and offered admission but pt prefers outpt f.u. all results d/w pt & copies given, strict return precautions discussed, rec outpt f.u with pcp. Patient agreeable with plan and demonstrates understanding.

## 2025-07-13 NOTE — ED PROVIDER NOTE - CONSIDERATION OF ADMISSION OBSERVATION
conversation had with patient about admission for weakness and pt prefers outpt f.u Consideration of Admission/Observation

## 2025-07-13 NOTE — ED PROVIDER NOTE - CARE PROVIDER_API CALL
Maury Contreras  Cardiovascular Disease  [address] [place] [address]  Phone: [phone]  Fax: [phone]  Follow Up Time:

## 2025-07-13 NOTE — ED PROVIDER NOTE - PHYSICAL EXAMINATION
CONSTITUTIONAL: Well-appearing; well-nourished; in no apparent distress.   EYES: PERRL; EOM intact.   ENT: normal nose; no rhinorrhea; normal pharynx with no tonsillar hypertrophy.   NECK: Supple; non-tender; no cervical lymphadenopathy.   CARDIOVASCULAR: Normal S1, S2; no murmurs, rubs, or gallops. Equal radial pulses  RESPIRATORY: Normal chest excursion with respiration; breath sounds clear and equal bilaterally; no wheezes, rhonchi, or rales.  GI/: Normal bowel sounds; non-distended; non-tender; no palpable organomegaly.   MS: No evidence of trauma or deformity. Normal ROM in all four extremities; non-tender to palpation; distal pulses are normal.   SKIN: Normal for age and race; warm; dry; good turgor; no apparent lesions or exudate.   NEURO/PSYCH: A & O x 4; grossly unremarkable. No focal deficits. No facial droop. No tongue deviation. Cerebellar intact. Normal gait. Sensation intact

## 2025-07-15 LAB
CULTURE RESULTS: SIGNIFICANT CHANGE UP
SPECIMEN SOURCE: SIGNIFICANT CHANGE UP

## 2025-07-22 NOTE — HISTORY OF PRESENT ILLNESS
[de-identified] : Mr. SAMUEL RANKELL is a 81 year old male with hx of HTN, HLD, BPH, A fib, s/p ablation, PPM, forgetfulness, presenting for a follow up visit. Rx refills. Pt is accompanied by his wife.   As per pt's wife pt has been experiencing swelling in his b/l ankles and rashes in legs. Also c/o urinary incontinence for the past month. Denies any SOB, CP, abdominal pain, N/V/D, headache, dizziness  Reports compliance with taking his meds daily. [Nocturia] : nocturia [Currently Experiencing ___] :  [unfilled] [None] : None [FreeTextEntry1] : YARA WILLIAMSON is a 75 year old male with a past medical history of BPH. Presents to the office today for a follow up. Currently states he has no urinary issues except for nocturia 0-1 x a night. Denies flank pain,dysuria, gross hematuria, fever, or chills. Non smoker. He is on Tamsulosin 0.4 mg daily started 3 weeks ago and states his urination is good, no complaints. Satisfied with urinary condition. No recent UTI's. \par \par 12/2019\par UA negative\par Urine culture- no growth\par \par US 12/2019 -- images visualized by me\par -PVR - 63 cc\par -Prostate volume 52 cc [Urinary Frequency] : no urinary frequency [Dysuria] : no dysuria [Hematuria - Gross] : no gross hematuria

## 2025-07-31 ENCOUNTER — NON-APPOINTMENT (OUTPATIENT)
Age: 80
End: 2025-07-31

## 2025-08-11 ENCOUNTER — APPOINTMENT (OUTPATIENT)
Dept: CARDIOLOGY | Facility: CLINIC | Age: 80
End: 2025-08-11
Payer: MEDICARE

## 2025-08-11 VITALS
BODY MASS INDEX: 42.13 KG/M2 | HEART RATE: 77 BPM | WEIGHT: 278 LBS | DIASTOLIC BLOOD PRESSURE: 60 MMHG | SYSTOLIC BLOOD PRESSURE: 90 MMHG | HEIGHT: 68 IN

## 2025-08-11 DIAGNOSIS — E66.01 MORBID (SEVERE) OBESITY DUE TO EXCESS CALORIES: ICD-10-CM

## 2025-08-11 DIAGNOSIS — Z86.79 PERSONAL HISTORY OF OTHER DISEASES OF THE CIRCULATORY SYSTEM: ICD-10-CM

## 2025-08-11 DIAGNOSIS — Z95.1 PRESENCE OF AORTOCORONARY BYPASS GRAFT: ICD-10-CM

## 2025-08-11 PROCEDURE — 93000 ELECTROCARDIOGRAM COMPLETE: CPT

## 2025-08-11 PROCEDURE — 99214 OFFICE O/P EST MOD 30 MIN: CPT | Mod: 25

## 2025-08-11 RX ORDER — SIMVASTATIN 20 MG/1
20 TABLET, FILM COATED ORAL
Qty: 90 | Refills: 3 | Status: ACTIVE | COMMUNITY
Start: 2025-08-11 | End: 1900-01-01

## 2025-08-11 RX ORDER — FUROSEMIDE 20 MG/1
20 TABLET ORAL DAILY
Refills: 0 | Status: ACTIVE | COMMUNITY

## 2025-08-11 RX ORDER — FUROSEMIDE 20 MG/1
20 TABLET ORAL
Qty: 90 | Refills: 3 | Status: ACTIVE | COMMUNITY
Start: 2025-08-11 | End: 1900-01-01

## 2025-08-11 RX ORDER — LISINOPRIL 5 MG/1
5 TABLET ORAL DAILY
Refills: 0 | Status: ACTIVE | COMMUNITY

## 2025-08-11 RX ORDER — LISINOPRIL 5 MG/1
5 TABLET ORAL
Qty: 90 | Refills: 3 | Status: ACTIVE | COMMUNITY
Start: 2025-08-11 | End: 1900-01-01

## 2025-08-27 ENCOUNTER — APPOINTMENT (OUTPATIENT)
Dept: CARDIOLOGY | Facility: CLINIC | Age: 80
End: 2025-08-27
Payer: MEDICARE

## 2025-08-27 ENCOUNTER — NON-APPOINTMENT (OUTPATIENT)
Age: 80
End: 2025-08-27

## 2025-08-27 PROCEDURE — 93294 REM INTERROG EVL PM/LDLS PM: CPT

## 2025-08-27 PROCEDURE — 93296 REM INTERROG EVL PM/IDS: CPT

## 2025-09-15 ENCOUNTER — NON-APPOINTMENT (OUTPATIENT)
Age: 80
End: 2025-09-15